# Patient Record
Sex: FEMALE | Race: ASIAN | NOT HISPANIC OR LATINO | Employment: OTHER | URBAN - METROPOLITAN AREA
[De-identification: names, ages, dates, MRNs, and addresses within clinical notes are randomized per-mention and may not be internally consistent; named-entity substitution may affect disease eponyms.]

---

## 2017-02-27 ENCOUNTER — HOSPITAL ENCOUNTER (OUTPATIENT)
Dept: LAB | Facility: MEDICAL CENTER | Age: 77
End: 2017-02-27
Attending: INTERNAL MEDICINE
Payer: MEDICARE

## 2017-02-27 LAB
ALBUMIN SERPL BCP-MCNC: 3.9 G/DL (ref 3.2–4.9)
ALBUMIN/GLOB SERPL: 1.3 G/DL
ALP SERPL-CCNC: 69 U/L (ref 30–99)
ALT SERPL-CCNC: 23 U/L (ref 2–50)
ANION GAP SERPL CALC-SCNC: 9 MMOL/L (ref 0–11.9)
AST SERPL-CCNC: 25 U/L (ref 12–45)
BILIRUB SERPL-MCNC: 0.5 MG/DL (ref 0.1–1.5)
BUN SERPL-MCNC: 26 MG/DL (ref 8–22)
CALCIUM SERPL-MCNC: 9.5 MG/DL (ref 8.5–10.5)
CHLORIDE SERPL-SCNC: 106 MMOL/L (ref 96–112)
CHOLEST SERPL-MCNC: 144 MG/DL (ref 100–199)
CO2 SERPL-SCNC: 26 MMOL/L (ref 20–33)
CREAT SERPL-MCNC: 0.89 MG/DL (ref 0.5–1.4)
CREAT UR-MCNC: 52.5 MG/DL
EST. AVERAGE GLUCOSE BLD GHB EST-MCNC: 157 MG/DL
GLOBULIN SER CALC-MCNC: 3.1 G/DL (ref 1.9–3.5)
GLUCOSE SERPL-MCNC: 138 MG/DL (ref 65–99)
HBA1C MFR BLD: 7.1 % (ref 0–5.6)
HDLC SERPL-MCNC: 47 MG/DL
LDLC SERPL CALC-MCNC: 80 MG/DL
MICROALBUMIN UR-MCNC: 7.7 MG/DL
MICROALBUMIN/CREAT UR: 147 MG/G (ref 0–30)
POTASSIUM SERPL-SCNC: 4 MMOL/L (ref 3.6–5.5)
PROT SERPL-MCNC: 7 G/DL (ref 6–8.2)
SODIUM SERPL-SCNC: 141 MMOL/L (ref 135–145)
TRIGL SERPL-MCNC: 87 MG/DL (ref 0–149)

## 2017-02-27 PROCEDURE — 36415 COLL VENOUS BLD VENIPUNCTURE: CPT

## 2017-02-27 PROCEDURE — 82043 UR ALBUMIN QUANTITATIVE: CPT

## 2017-02-27 PROCEDURE — 82570 ASSAY OF URINE CREATININE: CPT

## 2017-02-27 PROCEDURE — 83036 HEMOGLOBIN GLYCOSYLATED A1C: CPT

## 2017-02-27 PROCEDURE — 80061 LIPID PANEL: CPT

## 2017-02-27 PROCEDURE — 80053 COMPREHEN METABOLIC PANEL: CPT

## 2017-09-16 ENCOUNTER — HOSPITAL ENCOUNTER (OUTPATIENT)
Dept: LAB | Facility: MEDICAL CENTER | Age: 77
End: 2017-09-16
Attending: INTERNAL MEDICINE
Payer: MEDICARE

## 2017-09-16 LAB
ALBUMIN SERPL BCP-MCNC: 4.4 G/DL (ref 3.2–4.9)
ALBUMIN/GLOB SERPL: 1.4 G/DL
ALP SERPL-CCNC: 70 U/L (ref 30–99)
ALT SERPL-CCNC: 25 U/L (ref 2–50)
ANION GAP SERPL CALC-SCNC: 9 MMOL/L (ref 0–11.9)
AST SERPL-CCNC: 24 U/L (ref 12–45)
BILIRUB SERPL-MCNC: 0.7 MG/DL (ref 0.1–1.5)
BUN SERPL-MCNC: 31 MG/DL (ref 8–22)
CALCIUM SERPL-MCNC: 10.1 MG/DL (ref 8.5–10.5)
CHLORIDE SERPL-SCNC: 102 MMOL/L (ref 96–112)
CHOLEST SERPL-MCNC: 160 MG/DL (ref 100–199)
CO2 SERPL-SCNC: 27 MMOL/L (ref 20–33)
CREAT SERPL-MCNC: 0.86 MG/DL (ref 0.5–1.4)
CREAT UR-MCNC: 20.6 MG/DL
EST. AVERAGE GLUCOSE BLD GHB EST-MCNC: 166 MG/DL
GFR SERPL CREATININE-BSD FRML MDRD: >60 ML/MIN/1.73 M 2
GLOBULIN SER CALC-MCNC: 3.2 G/DL (ref 1.9–3.5)
GLUCOSE SERPL-MCNC: 142 MG/DL (ref 65–99)
HBA1C MFR BLD: 7.4 % (ref 0–5.6)
HDLC SERPL-MCNC: 51 MG/DL
LDLC SERPL CALC-MCNC: 78 MG/DL
MICROALBUMIN UR-MCNC: 1.3 MG/DL
MICROALBUMIN/CREAT UR: 63 MG/G (ref 0–30)
POTASSIUM SERPL-SCNC: 4.5 MMOL/L (ref 3.6–5.5)
PROT SERPL-MCNC: 7.6 G/DL (ref 6–8.2)
SODIUM SERPL-SCNC: 138 MMOL/L (ref 135–145)
TRIGL SERPL-MCNC: 155 MG/DL (ref 0–149)

## 2017-09-16 PROCEDURE — 82570 ASSAY OF URINE CREATININE: CPT

## 2017-09-16 PROCEDURE — 80053 COMPREHEN METABOLIC PANEL: CPT

## 2017-09-16 PROCEDURE — 83036 HEMOGLOBIN GLYCOSYLATED A1C: CPT

## 2017-09-16 PROCEDURE — 36415 COLL VENOUS BLD VENIPUNCTURE: CPT

## 2017-09-16 PROCEDURE — 80061 LIPID PANEL: CPT

## 2017-09-16 PROCEDURE — 82043 UR ALBUMIN QUANTITATIVE: CPT

## 2017-10-16 ENCOUNTER — HOSPITAL ENCOUNTER (OUTPATIENT)
Dept: RADIOLOGY | Facility: MEDICAL CENTER | Age: 77
End: 2017-10-16
Attending: FAMILY MEDICINE
Payer: MEDICARE

## 2017-10-16 DIAGNOSIS — Z12.31 SCREENING MAMMOGRAM, ENCOUNTER FOR: ICD-10-CM

## 2017-10-16 PROCEDURE — G0202 SCR MAMMO BI INCL CAD: HCPCS

## 2018-01-27 ENCOUNTER — OFFICE VISIT (OUTPATIENT)
Dept: URGENT CARE | Facility: CLINIC | Age: 78
End: 2018-01-27
Payer: MEDICARE

## 2018-01-27 VITALS
SYSTOLIC BLOOD PRESSURE: 122 MMHG | HEIGHT: 64 IN | OXYGEN SATURATION: 95 % | DIASTOLIC BLOOD PRESSURE: 80 MMHG | BODY MASS INDEX: 23.56 KG/M2 | RESPIRATION RATE: 20 BRPM | HEART RATE: 92 BPM | WEIGHT: 138 LBS | TEMPERATURE: 98 F

## 2018-01-27 DIAGNOSIS — R42 DIZZINESS: ICD-10-CM

## 2018-01-27 DIAGNOSIS — R11.0 NAUSEA: ICD-10-CM

## 2018-01-27 PROCEDURE — 99214 OFFICE O/P EST MOD 30 MIN: CPT | Performed by: PHYSICIAN ASSISTANT

## 2018-01-27 RX ORDER — ONDANSETRON 4 MG/1
4 TABLET, FILM COATED ORAL EVERY 4 HOURS PRN
Qty: 20 TAB | Refills: 0 | Status: SHIPPED | OUTPATIENT
Start: 2018-01-27 | End: 2018-02-01

## 2018-01-27 ASSESSMENT — ENCOUNTER SYMPTOMS
SINUS PAIN: 0
RESPIRATORY NEGATIVE: 1
VERTIGO: 1
HEADACHES: 0
MYALGIAS: 0
SENSORY CHANGE: 0
CARDIOVASCULAR NEGATIVE: 1
NAUSEA: 1
BLURRED VISION: 0
FEVER: 0
VOMITING: 0
TINGLING: 0
DOUBLE VISION: 0
CHILLS: 0
SORE THROAT: 0
DIARRHEA: 1
ABDOMINAL PAIN: 0
BLOOD IN STOOL: 0
DIZZINESS: 1

## 2018-01-27 ASSESSMENT — PATIENT HEALTH QUESTIONNAIRE - PHQ9: CLINICAL INTERPRETATION OF PHQ2 SCORE: 0

## 2018-01-27 NOTE — PROGRESS NOTES
Subjective:      Jewell Diaz is a 77 y.o. female who presents with Dizziness (Since yesterday dizziness)            Dizziness   This is a new problem. The current episode started yesterday. The problem occurs rarely. The problem has been rapidly improving. Associated symptoms include nausea and vertigo. Pertinent negatives include no abdominal pain, chills, congestion, fever, headaches, myalgias, sore throat or vomiting. Exacerbated by: Position change. She has tried nothing for the symptoms. The treatment provided no relief.   History of BPPV. Patient states this feels very similar. She is much improved today.       PMH:  has a past medical history of CATARACT (2009); Diabetes (2007); DJD (degenerative joint disease) of thoracic spine; Hypertension; and Pain.  MEDS:   Current Outpatient Prescriptions:   •  losartan (COZAAR) 50 MG TABS, Take 50 mg by mouth every day., Disp: , Rfl:   •  Metformin HCl 500 MG (MOD) TB24, Take  by mouth., Disp: , Rfl:   •  simvastatin (ZOCOR) 20 MG TABS, Take 20 mg by mouth every evening., Disp: , Rfl:   •  omeprazole (PRILOSEC) 20 MG CPDR, Take 20 mg by mouth every day., Disp: , Rfl:   •  azithromycin (ZITHROMAX) 250 MG Tab, Take 2 pills by mouth on day 1, take 1 pill by mouth on days 2-5, Disp: 6 Tab, Rfl: 0  •  benzonatate (TESSALON) 100 MG Cap, Take 1 Cap by mouth 3 times a day as needed for Cough., Disp: 30 Cap, Rfl: 0  •  multivitamin (THERAGRAN) TABS, Take 1 Tab by mouth every day., Disp: , Rfl:   ALLERGIES:   Allergies   Allergen Reactions   • Nkda [No Known Drug Allergy]      SURGHX:   Past Surgical History:   Procedure Laterality Date   • SHOSHANA BY LAPAROSCOPY  8/21/2012    Performed by CAMMIE HUGHES at SURGERY [facility]   • LUMBAR FUSION POSTERIOR  5/6/2010    Performed by CASSI RESTREPO at SURGERY [facility] ORS   • LUMBAR DECOMPRESSION  5/6/2010    Performed by CASSI RESTREPO at SURGERY [facility] ORS   • LUMBAR LAMINECTOMY DISKECTOMY  12/26/2009    Performed by  "CASSI RESTREPO at SURGERY [facility] ORS   • CATARACT EXT W/IOL  6/2009    bilateral   • VAGINAL HYSTERECTOMY TOTAL  1976    Hysterectomy,Total Vaginal     SOCHX:  reports that she has never smoked. She has never used smokeless tobacco. She reports that she does not drink alcohol or use drugs.  FH: family history includes Cancer in her sister.      Review of Systems   Constitutional: Negative for chills, fever and malaise/fatigue.   HENT: Negative for congestion, ear pain, sinus pain and sore throat.    Eyes: Negative for blurred vision and double vision.   Respiratory: Negative.    Cardiovascular: Negative.    Gastrointestinal: Positive for diarrhea and nausea. Negative for abdominal pain, blood in stool, melena and vomiting.   Genitourinary: Negative.    Musculoskeletal: Negative for joint pain and myalgias.   Neurological: Positive for dizziness and vertigo. Negative for tingling, sensory change and headaches.       Medications, Allergies, and current problem list reviewed today in Epic     Objective:     /80   Pulse 92   Temp 36.7 °C (98 °F)   Resp 20   Ht 1.626 m (5' 4\")   Wt 62.6 kg (138 lb)   SpO2 95%   BMI 23.69 kg/m²      Physical Exam   Constitutional: She is oriented to person, place, and time. She appears well-developed and well-nourished. No distress.   HENT:   Head: Normocephalic and atraumatic.   Right Ear: Tympanic membrane and external ear normal.   Left Ear: Tympanic membrane and external ear normal.   Nose: Nose normal.   Mouth/Throat: Oropharynx is clear and moist. No oropharyngeal exudate.   Eyes: Conjunctivae and EOM are normal. Pupils are equal, round, and reactive to light. Right eye exhibits no discharge. Left eye exhibits no discharge.   Neck: Normal range of motion. Neck supple.   Cardiovascular: Normal rate, regular rhythm and normal heart sounds.    Pulmonary/Chest: Effort normal and breath sounds normal. No respiratory distress. She has no wheezes.   Musculoskeletal: Normal " range of motion.   Lymphadenopathy:     She has no cervical adenopathy.   Neurological: She is alert and oriented to person, place, and time. She displays normal reflexes. No cranial nerve deficit. Coordination normal.   Skin: Skin is warm and dry. She is not diaphoretic.   Psychiatric: She has a normal mood and affect. Her behavior is normal. Judgment and thought content normal.   Nursing note and vitals reviewed.              Assessment/Plan:     1. Dizziness  ondansetron (ZOFRAN) 4 MG Tab tablet    CT-HEAD W/O   2. Nausea  ondansetron (ZOFRAN) 4 MG Tab tablet     Vitals normal, exam unremarkable, and her exam normal. Dizziness is positional with associated nausea. She has a history of vertigo. Given age a CT scan was ordered. Patient declines today. She will have that completed in the next few days if she is still symptomatic.  Zofran for nausea  OTC meds and conservative measures as discussed  Return to clinic or go to ED if symptoms worsen or persist. Indications for ED discussed at length. Patient voices understanding. Follow-up with your primary care provider in 3-5 days. Red flags discussed.    Please note that this dictation was created using voice recognition software. I have made every reasonable attempt to correct obvious errors, but I expect that there are errors of grammar and possibly content that I did not discover before finalizing the note.

## 2018-04-03 ENCOUNTER — HOSPITAL ENCOUNTER (OUTPATIENT)
Dept: CARDIOLOGY | Facility: MEDICAL CENTER | Age: 78
End: 2018-04-03
Attending: FAMILY MEDICINE
Payer: MEDICARE

## 2018-04-03 DIAGNOSIS — R01.1 UNDIAGNOSED CARDIAC MURMURS: ICD-10-CM

## 2018-04-03 LAB
LV EJECT FRACT  99904: 65
LV EJECT FRACT MOD 2C 99903: 75.85
LV EJECT FRACT MOD 4C 99902: 80.75
LV EJECT FRACT MOD BP 99901: 78.72

## 2018-04-03 PROCEDURE — 93306 TTE W/DOPPLER COMPLETE: CPT

## 2018-10-17 ENCOUNTER — HOSPITAL ENCOUNTER (OUTPATIENT)
Dept: RADIOLOGY | Facility: MEDICAL CENTER | Age: 78
End: 2018-10-17
Attending: FAMILY MEDICINE
Payer: MEDICARE

## 2018-10-17 DIAGNOSIS — Z12.31 SCREENING MAMMOGRAM, ENCOUNTER FOR: ICD-10-CM

## 2018-10-17 PROCEDURE — 77067 SCR MAMMO BI INCL CAD: CPT

## 2018-11-15 ENCOUNTER — HOSPITAL ENCOUNTER (OUTPATIENT)
Dept: LAB | Facility: MEDICAL CENTER | Age: 78
End: 2018-11-15
Attending: FAMILY MEDICINE
Payer: MEDICARE

## 2018-11-15 LAB
EST. AVERAGE GLUCOSE BLD GHB EST-MCNC: 174 MG/DL
HBA1C MFR BLD: 7.7 % (ref 0–5.6)

## 2018-11-15 PROCEDURE — 83036 HEMOGLOBIN GLYCOSYLATED A1C: CPT

## 2018-11-15 PROCEDURE — 82570 ASSAY OF URINE CREATININE: CPT

## 2018-11-15 PROCEDURE — 36415 COLL VENOUS BLD VENIPUNCTURE: CPT

## 2018-11-15 PROCEDURE — 80061 LIPID PANEL: CPT

## 2018-11-15 PROCEDURE — 84443 ASSAY THYROID STIM HORMONE: CPT

## 2018-11-15 PROCEDURE — 80053 COMPREHEN METABOLIC PANEL: CPT

## 2018-11-15 PROCEDURE — 82043 UR ALBUMIN QUANTITATIVE: CPT

## 2018-11-16 LAB
ALBUMIN SERPL BCP-MCNC: 4.3 G/DL (ref 3.2–4.9)
ALBUMIN/GLOB SERPL: 1.3 G/DL
ALP SERPL-CCNC: 69 U/L (ref 30–99)
ALT SERPL-CCNC: 21 U/L (ref 2–50)
ANION GAP SERPL CALC-SCNC: 8 MMOL/L (ref 0–11.9)
AST SERPL-CCNC: 24 U/L (ref 12–45)
BILIRUB SERPL-MCNC: 0.9 MG/DL (ref 0.1–1.5)
BUN SERPL-MCNC: 29 MG/DL (ref 8–22)
CALCIUM SERPL-MCNC: 10.4 MG/DL (ref 8.5–10.5)
CHLORIDE SERPL-SCNC: 103 MMOL/L (ref 96–112)
CHOLEST SERPL-MCNC: 139 MG/DL (ref 100–199)
CO2 SERPL-SCNC: 28 MMOL/L (ref 20–33)
CREAT SERPL-MCNC: 0.83 MG/DL (ref 0.5–1.4)
CREAT UR-MCNC: 44.4 MG/DL
GLOBULIN SER CALC-MCNC: 3.2 G/DL (ref 1.9–3.5)
GLUCOSE SERPL-MCNC: 147 MG/DL (ref 65–99)
HDLC SERPL-MCNC: 47 MG/DL
LDLC SERPL CALC-MCNC: 65 MG/DL
MICROALBUMIN UR-MCNC: 1.6 MG/DL
MICROALBUMIN/CREAT UR: 36 MG/G (ref 0–30)
POTASSIUM SERPL-SCNC: 4.1 MMOL/L (ref 3.6–5.5)
PROT SERPL-MCNC: 7.5 G/DL (ref 6–8.2)
SODIUM SERPL-SCNC: 139 MMOL/L (ref 135–145)
TRIGL SERPL-MCNC: 133 MG/DL (ref 0–149)
TSH SERPL DL<=0.005 MIU/L-ACNC: 1.64 UIU/ML (ref 0.38–5.33)

## 2018-12-14 ENCOUNTER — OFFICE VISIT (OUTPATIENT)
Dept: URGENT CARE | Facility: CLINIC | Age: 78
End: 2018-12-14
Payer: MEDICARE

## 2018-12-14 VITALS
HEART RATE: 88 BPM | DIASTOLIC BLOOD PRESSURE: 66 MMHG | OXYGEN SATURATION: 90 % | WEIGHT: 138 LBS | SYSTOLIC BLOOD PRESSURE: 114 MMHG | TEMPERATURE: 98.8 F | BODY MASS INDEX: 23.56 KG/M2 | RESPIRATION RATE: 18 BRPM | HEIGHT: 64 IN

## 2018-12-14 DIAGNOSIS — J22 LOWER RESPIRATORY INFECTION: Primary | ICD-10-CM

## 2018-12-14 LAB
FLUAV+FLUBV AG SPEC QL IA: NORMAL
INT CON NEG: NEGATIVE
INT CON POS: POSITIVE

## 2018-12-14 PROCEDURE — 99214 OFFICE O/P EST MOD 30 MIN: CPT | Performed by: PHYSICIAN ASSISTANT

## 2018-12-14 PROCEDURE — 87804 INFLUENZA ASSAY W/OPTIC: CPT | Performed by: PHYSICIAN ASSISTANT

## 2018-12-14 RX ORDER — BENZONATATE 100 MG/1
100 CAPSULE ORAL 3 TIMES DAILY PRN
Qty: 30 CAP | Refills: 0 | Status: SHIPPED | OUTPATIENT
Start: 2018-12-14 | End: 2019-05-03

## 2018-12-14 RX ORDER — AZITHROMYCIN 250 MG/1
TABLET, FILM COATED ORAL
Qty: 6 TAB | Refills: 0 | Status: SHIPPED | OUTPATIENT
Start: 2018-12-14 | End: 2019-05-03

## 2018-12-14 ASSESSMENT — ENCOUNTER SYMPTOMS
RHINORRHEA: 1
FEVER: 1
CHILLS: 1
COUGH: 1
CONSTIPATION: 0
NAUSEA: 0
SHORTNESS OF BREATH: 0
DIARRHEA: 1
SPUTUM PRODUCTION: 0
SORE THROAT: 0
WHEEZING: 0
ABDOMINAL PAIN: 0
VOMITING: 0
MYALGIAS: 1

## 2018-12-14 ASSESSMENT — COPD QUESTIONNAIRES: COPD: 0

## 2018-12-14 NOTE — PROGRESS NOTES
Subjective:   Jewell Diaz is a 78 y.o. female who presents for Cough (x 3 dry; ); Generalized Body Aches (x 3 days; ); and Fever (x 2 days )        Cough   This is a new problem. Episode onset: 3 days. The cough is non-productive. Associated symptoms include chills, a fever (pt percieved ), myalgias and rhinorrhea. Pertinent negatives include no ear pain, nasal congestion, sore throat, shortness of breath or wheezing. Risk factors: Denies recent travel, hospitalization, surgeries. Nonsmoker.  Treatments tried: Robitussin and tylenol  The treatment provided mild relief. Her past medical history is significant for bronchitis. There is no history of asthma, COPD or pneumonia.     Review of Systems   Constitutional: Positive for chills and fever (pt percieved ). Negative for malaise/fatigue.   HENT: Positive for rhinorrhea. Negative for congestion, ear pain and sore throat.    Respiratory: Positive for cough. Negative for sputum production, shortness of breath and wheezing.    Gastrointestinal: Positive for diarrhea. Negative for abdominal pain, constipation, nausea and vomiting.   Musculoskeletal: Positive for myalgias.   All other systems reviewed and are negative.      PMH:  has a past medical history of CATARACT (2009); Diabetes (2007); DJD (degenerative joint disease) of thoracic spine; Hypertension; and Pain.  MEDS:   Current Outpatient Prescriptions:   •  azithromycin (ZITHROMAX) 250 MG Tab, Take 2 pills by mouth on day 1, take 1 pill by mouth on days 2-5, Disp: 6 Tab, Rfl: 0  •  benzonatate (TESSALON) 100 MG Cap, Take 1 Cap by mouth 3 times a day as needed for Cough., Disp: 30 Cap, Rfl: 0  •  losartan (COZAAR) 50 MG TABS, Take 50 mg by mouth every day., Disp: , Rfl:   •  multivitamin (THERAGRAN) TABS, Take 1 Tab by mouth every day., Disp: , Rfl:   •  Metformin HCl 500 MG (MOD) TB24, Take  by mouth., Disp: , Rfl:   •  simvastatin (ZOCOR) 20 MG TABS, Take 20 mg by mouth every evening., Disp: , Rfl:  "  •  omeprazole (PRILOSEC) 20 MG CPDR, Take 20 mg by mouth every day., Disp: , Rfl:   ALLERGIES:   Allergies   Allergen Reactions   • Nkda [No Known Drug Allergy]      SURGHX:   Past Surgical History:   Procedure Laterality Date   • SHOSHANA BY LAPAROSCOPY  8/21/2012    Performed by CAMMIE HUGHES at SURGERY [facility] ORS   • LUMBAR FUSION POSTERIOR  5/6/2010    Performed by CASSI RESTREPO at SURGERY [facility]S   • LUMBAR DECOMPRESSION  5/6/2010    Performed by CASSI RESTREPO at SURGERY [facility] OR[facility]   • LUMBAR LAMINECTOMY DISKECTOMY  12/26/2009    Performed by CASSI RESTREPO at SURGERY [facility] ORS   • CATARACT EXT W/IOL  6/2009    bilateral   • VAGINAL HYSTERECTOMY TOTAL  1976    Hysterectomy,Total Vaginal     SOCHX:  reports that she has never smoked. She has never used smokeless tobacco. She reports that she does not drink alcohol or use drugs.  Family History   Problem Relation Age of Onset   • Cancer Sister         breast        Objective:   /66 (BP Location: Left arm, Patient Position: Sitting, BP Cuff Size: Adult)   Pulse 88   Temp 37.1 °C (98.8 °F) (Temporal)   Resp 18   Ht 1.626 m (5' 4\")   Wt 62.6 kg (138 lb)   SpO2 90%   BMI 23.69 kg/m²     Physical Exam   Constitutional: She is oriented to person, place, and time. She appears well-developed and well-nourished. No distress.   HENT:   Head: Normocephalic and atraumatic.   Right Ear: Hearing, tympanic membrane and ear canal normal.   Left Ear: Hearing, tympanic membrane and ear canal normal.   Mouth/Throat: No posterior oropharyngeal edema or posterior oropharyngeal erythema.   Eyes: Pupils are equal, round, and reactive to light. Conjunctivae are normal.   Neck: Normal range of motion. Neck supple.   Cardiovascular: Normal rate and regular rhythm.    Pulmonary/Chest: Effort normal. No respiratory distress. She has no wheezes. She has no rales.   Lymphadenopathy:     She has no cervical adenopathy.   Neurological: She is alert and " oriented to person, place, and time.   Skin: Skin is warm and dry.   Psychiatric: She has a normal mood and affect. Her behavior is normal.   Vitals reviewed.    Influenza A/B negative        Assessment/Plan:     1. Lower respiratory infection  azithromycin (ZITHROMAX) 250 MG Tab    benzonatate (TESSALON) 100 MG Cap    REFERRAL TO FOLLOW-UP WITH PRIMARY CARE    POCT Influenza A/B     Patient directed to take full course of abx.  Supportive care reviewed. Patient would like to defer chest x-ray today, patient can contact me if symptoms persist and she would like me to place outpatient x-ray.    Discussed importance of follow-up with primary care provider within 7-10 days.  If symptoms worsen or persist patient can contact me or return to clinic for follow-up.    Thorough review of red flags and strict emergency room precautions discussed.  Patient and  appear understanding of information.

## 2018-12-14 NOTE — PATIENT INSTRUCTIONS
"Increase fluids   Warm salt water gargles     If symptoms are not improved please call me we can get a chest xray       Upper Respiratory Infection, Adult  Most upper respiratory infections (URIs) are caused by a virus. A URI affects the nose, throat, and upper air passages. The most common type of URI is often called \"the common cold.\"  Follow these instructions at home:  · Take medicines only as told by your doctor.  · Gargle warm saltwater or take cough drops to comfort your throat as told by your doctor.  · Use a warm mist humidifier or inhale steam from a shower to increase air moisture. This may make it easier to breathe.  · Drink enough fluid to keep your pee (urine) clear or pale yellow.  · Eat soups and other clear broths.  · Have a healthy diet.  · Rest as needed.  · Go back to work when your fever is gone or your doctor says it is okay.  ¨ You may need to stay home longer to avoid giving your URI to others.  ¨ You can also wear a face mask and wash your hands often to prevent spread of the virus.  · Use your inhaler more if you have asthma.  · Do not use any tobacco products, including cigarettes, chewing tobacco, or electronic cigarettes. If you need help quitting, ask your doctor.  Contact a doctor if:  · You are getting worse, not better.  · Your symptoms are not helped by medicine.  · You have chills.  · You are getting more short of breath.  · You have brown or red mucus.  · You have yellow or brown discharge from your nose.  · You have pain in your face, especially when you bend forward.  · You have a fever.  · You have puffy (swollen) neck glands.  · You have pain while swallowing.  · You have white areas in the back of your throat.  Get help right away if:  · You have very bad or constant:  ¨ Headache.  ¨ Ear pain.  ¨ Pain in your forehead, behind your eyes, and over your cheekbones (sinus pain).  ¨ Chest pain.  · You have long-lasting (chronic) lung disease and any of the " following:  ¨ Wheezing.  ¨ Long-lasting cough.  ¨ Coughing up blood.  ¨ A change in your usual mucus.  · You have a stiff neck.  · You have changes in your:  ¨ Vision.  ¨ Hearing.  ¨ Thinking.  ¨ Mood.  This information is not intended to replace advice given to you by your health care provider. Make sure you discuss any questions you have with your health care provider.  Document Released: 06/05/2009 Document Revised: 08/20/2017 Document Reviewed: 03/25/2015  Elsevier Interactive Patient Education © 2017 [employer]

## 2019-02-23 ENCOUNTER — APPOINTMENT (OUTPATIENT)
Dept: URGENT CARE | Facility: MEDICAL CENTER | Age: 79
End: 2019-02-23
Payer: MEDICARE

## 2019-02-23 ENCOUNTER — OFFICE VISIT (OUTPATIENT)
Dept: URGENT CARE | Facility: MEDICAL CENTER | Age: 79
End: 2019-02-23
Payer: MEDICARE

## 2019-02-23 VITALS
SYSTOLIC BLOOD PRESSURE: 116 MMHG | HEART RATE: 79 BPM | HEIGHT: 64 IN | DIASTOLIC BLOOD PRESSURE: 68 MMHG | WEIGHT: 146.2 LBS | TEMPERATURE: 99 F | OXYGEN SATURATION: 94 % | BODY MASS INDEX: 24.96 KG/M2

## 2019-02-23 DIAGNOSIS — S16.1XXA STRAIN OF NECK MUSCLE, INITIAL ENCOUNTER: ICD-10-CM

## 2019-02-23 PROCEDURE — 99214 OFFICE O/P EST MOD 30 MIN: CPT | Performed by: PHYSICIAN ASSISTANT

## 2019-02-23 RX ORDER — METHYLPREDNISOLONE 4 MG/1
TABLET ORAL
Qty: 1 KIT | Refills: 0 | Status: SHIPPED | OUTPATIENT
Start: 2019-02-23 | End: 2019-05-03

## 2019-03-01 ASSESSMENT — ENCOUNTER SYMPTOMS
ABDOMINAL PAIN: 0
TINGLING: 0
PALPITATIONS: 0
WEAKNESS: 0
SENSORY CHANGE: 0
NAUSEA: 0
HEADACHES: 0
SYNCOPE: 0
NUMBNESS: 0
TROUBLE SWALLOWING: 0
BLURRED VISION: 0
WEIGHT LOSS: 0
FEVER: 0
CHILLS: 0
PHOTOPHOBIA: 0
VOMITING: 0
NECK PAIN: 1
SHORTNESS OF BREATH: 0

## 2019-03-01 NOTE — PROGRESS NOTES
Subjective:      Jewell Diaz is a 78 y.o. female who presents with Neck Pain (x1 day; headache; hx of high BP )      Neck Pain    This is a new problem. The current episode started yesterday. The problem occurs constantly. The problem has been gradually worsening. The pain is associated with a sleep position. The pain is present in the left side. The quality of the pain is described as aching and shooting. The pain is moderate. The symptoms are aggravated by position and twisting. The pain is same all the time. Pertinent negatives include no chest pain, fever, headaches, numbness, photophobia, syncope, tingling, trouble swallowing, weakness or weight loss. She has tried NSAIDs for the symptoms. The treatment provided no relief.       Review of Systems   Constitutional: Negative for chills, fever and weight loss.   HENT: Negative for trouble swallowing.    Eyes: Negative for blurred vision and photophobia.   Respiratory: Negative for shortness of breath.    Cardiovascular: Negative for chest pain, palpitations and syncope.   Gastrointestinal: Negative for abdominal pain, nausea and vomiting.   Musculoskeletal: Positive for neck pain.   Neurological: Negative for tingling, sensory change, weakness, numbness and headaches.       PMH:  has a past medical history of CATARACT (2009); Diabetes (2007); DJD (degenerative joint disease) of thoracic spine; Hypertension; and Pain.  MEDS:   Current Outpatient Prescriptions:   •  MethylPREDNISolone (MEDROL DOSEPAK) 4 MG Tablet Therapy Pack, Take as directed, Disp: 1 Kit, Rfl: 0  •  azithromycin (ZITHROMAX) 250 MG Tab, Take 2 pills by mouth on day 1, take 1 pill by mouth on days 2-5, Disp: 6 Tab, Rfl: 0  •  benzonatate (TESSALON) 100 MG Cap, Take 1 Cap by mouth 3 times a day as needed for Cough., Disp: 30 Cap, Rfl: 0  •  losartan (COZAAR) 50 MG TABS, Take 50 mg by mouth every day., Disp: , Rfl:   •  multivitamin (THERAGRAN) TABS, Take 1 Tab by mouth every day., Disp:  ", Rfl:   •  Metformin HCl 500 MG (MOD) TB24, Take  by mouth., Disp: , Rfl:   •  simvastatin (ZOCOR) 20 MG TABS, Take 20 mg by mouth every evening., Disp: , Rfl:   •  omeprazole (PRILOSEC) 20 MG CPDR, Take 20 mg by mouth every day., Disp: , Rfl:   ALLERGIES:   Allergies   Allergen Reactions   • Nkda [No Known Drug Allergy]      SURGHX:   Past Surgical History:   Procedure Laterality Date   • SHOSHANA BY LAPAROSCOPY  8/21/2012    Performed by CAMMIE HUGHES at SURGERY [facility]S   • LUMBAR FUSION POSTERIOR  5/6/2010    Performed by CASSI RESTREPO at SURGERY [facility]   • LUMBAR DECOMPRESSION  5/6/2010    Performed by CASSI RESTREPO at SURGERY [facility]   • LUMBAR LAMINECTOMY DISKECTOMY  12/26/2009    Performed by CASSI RESTREPO at SURGERY [facility]   • CATARACT EXT W/IOL  6/2009    bilateral   • VAGINAL HYSTERECTOMY TOTAL  1976    Hysterectomy,Total Vaginal     SOCHX:  reports that she has never smoked. She has never used smokeless tobacco. She reports that she does not drink alcohol or use drugs.  FH: Family history was reviewed, no pertinent findings to report     Objective:     /68   Pulse 79   Temp 37.2 °C (99 °F) (Temporal)   Ht 1.626 m (5' 4\")   Wt 66.3 kg (146 lb 3.2 oz)   SpO2 94%   BMI 25.10 kg/m²      Physical Exam   Constitutional: She is oriented to person, place, and time. She appears well-developed and well-nourished.   HENT:   Head: Normocephalic and atraumatic.   Right Ear: External ear normal.   Left Ear: External ear normal.   Eyes: Pupils are equal, round, and reactive to light. Conjunctivae are normal.   Cardiovascular: Normal rate, regular rhythm and normal heart sounds.    No murmur heard.  Pulmonary/Chest: Effort normal and breath sounds normal. She has no wheezes.   Musculoskeletal:        Cervical back: She exhibits decreased range of motion and tenderness. She exhibits no bony tenderness and no swelling.   Neurological: She is alert and oriented to person, " place, and time. She has normal strength. No cranial nerve deficit or sensory deficit.   Skin: Skin is warm and dry. Capillary refill takes less than 2 seconds.   Psychiatric: She has a normal mood and affect. Her behavior is normal. Judgment normal.   Vitals reviewed.       Assessment/Plan:     1. Strain of neck muscle, initial encounter  - MethylPREDNISolone (MEDROL DOSEPAK) 4 MG Tablet Therapy Pack; Take as directed  Dispense: 1 Kit; Refill: 0  - Alternate ice/heat  - Follow-up if no improvement after 4-5 days        Differential Diagnosis, natural history, and supportive care discussed. Return to the Urgent Care or follow up with your PCP if symptoms fail to resolve, or for any new or worsening symptoms. Emergency room precautions discussed. Patient and/or family appears understanding of information.

## 2019-05-03 ENCOUNTER — OFFICE VISIT (OUTPATIENT)
Dept: URGENT CARE | Facility: MEDICAL CENTER | Age: 79
End: 2019-05-03
Payer: MEDICARE

## 2019-05-03 VITALS
SYSTOLIC BLOOD PRESSURE: 124 MMHG | WEIGHT: 143 LBS | TEMPERATURE: 98 F | BODY MASS INDEX: 24.55 KG/M2 | OXYGEN SATURATION: 94 % | RESPIRATION RATE: 16 BRPM | DIASTOLIC BLOOD PRESSURE: 60 MMHG | HEART RATE: 97 BPM
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
## 2019-05-03 DIAGNOSIS — S13.4XXA WHIPLASH INJURIES, INITIAL ENCOUNTER: Primary | ICD-10-CM

## 2019-05-03 PROCEDURE — 99214 OFFICE O/P EST MOD 30 MIN: CPT | Performed by: PHYSICIAN ASSISTANT

## 2019-05-03 RX ORDER — METHYLPREDNISOLONE 4 MG/1
TABLET ORAL
Qty: 21 TAB | Refills: 0 | Status: ON HOLD | OUTPATIENT
Start: 2019-05-03 | End: 2020-07-21

## 2019-05-03 RX ORDER — KETOROLAC TROMETHAMINE 30 MG/ML
30 INJECTION, SOLUTION INTRAMUSCULAR; INTRAVENOUS ONCE
Status: COMPLETED | OUTPATIENT
Start: 2019-05-03 | End: 2019-05-03

## 2019-05-03 RX ADMIN — KETOROLAC TROMETHAMINE 30 MG: 30 INJECTION, SOLUTION INTRAMUSCULAR; INTRAVENOUS at 16:20

## 2019-05-03 NOTE — PATIENT INSTRUCTIONS
Cervical Sprain  A cervical sprain is a stretch or tear in the tissues that connect bones (ligaments) in the neck. Most neck (cervical) sprains get better in 4-6 weeks.  Follow these instructions at home:  If you have a neck collar:  · Wear it as told by your doctor. Do not take off (do not remove) the collar unless your doctor says that this is safe.  · Ask your doctor before adjusting your collar.  · If you have long hair, keep it outside of the collar.  · Ask your doctor if you may take off the collar for cleaning and bathing. If you may take off the collar:  ¨ Follow instructions from your doctor about how to take off the collar safely.  ¨ Clean the collar by wiping it with mild soap and water. Let it air-dry all the way.  ¨ If your collar has removable pads:  § Take the pads out every 1-2 days.  § Hand wash the pads with soap and water.  § Let the pads air-dry all the way before you put them back in the collar. Do not dry them in a clothes dryer. Do not dry them with a hair dryer.  ¨ Check your skin under the collar for irritation or sores. If you see any, tell your doctor.  Managing pain, stiffness, and swelling  · Use a cervical traction device, if told by your doctor.  · If told, put heat on the affected area. Do this before exercises (physical therapy) or as often as told by your doctor. Use the heat source that your doctor recommends, such as a moist heat pack or a heating pad.  ¨ Place a towel between your skin and the heat source.  ¨ Leave the heat on for 20-30 minutes.  ¨ Take the heat off (remove the heat) if your skin turns bright red. This is very important if you cannot feel pain, heat, or cold. You may have a greater risk of getting burned.  · Put ice on the affected area.  ¨ Put ice in a plastic bag.  ¨ Place a towel between your skin and the bag.  ¨ Leave the ice on for 20 minutes, 2-3 times a day.  Activity  · Do not drive while wearing a neck collar. If you do not have a neck collar, ask your  doctor if it is safe to drive.  · Do not drive or use heavy machinery while taking prescription pain medicine or muscle relaxants, unless your doctor approves.  · Do not lift anything that is heavier than 10 lb (4.5 kg) until your doctor tells you that it is safe.  · Rest as told by your doctor.  · Avoid activities that make you feel worse. Ask your doctor what activities are safe for you.  · Do exercises as told by your doctor or physical therapist.  Preventing neck sprain  · Practice good posture. Adjust your workstation to help with this, if needed.  · Exercise regularly as told by your doctor or physical therapist.  · Avoid activities that are risky or may cause a neck sprain (cervical sprain).  General instructions  · Take over-the-counter and prescription medicines only as told by your doctor.  · Do not use any products that contain nicotine or tobacco. This includes cigarettes and e-cigarettes. If you need help quitting, ask your doctor.  · Keep all follow-up visits as told by your doctor. This is important.  Contact a doctor if:  · You have pain or other symptoms that get worse.  · You have symptoms that do not get better after 2 weeks.  · You have pain that does not get better with medicine.  · You start to have new, unexplained symptoms.  · You have sores or irritated skin from wearing your neck collar.  Get help right away if:  · You have very bad pain.  · You have any of the following in any part of your body:  ¨ Loss of feeling (numbness).  ¨ Tingling.  ¨ Weakness.  · You cannot move a part of your body (you have paralysis).  · Your activity level does not improve.  Summary  · A cervical sprain is a stretch or tear in the tissues that connect bones (ligaments) in the neck.  · If you have a neck (cervical) collar, do not take off the collar unless your doctor says that this is safe.  · Put ice on affected areas as told by your doctor.  · Put heat on affected areas as told by your doctor.  · Good posture  and regular exercise can help prevent a neck sprain from happening again.  This information is not intended to replace advice given to you by your health care provider. Make sure you discuss any questions you have with your health care provider.  Document Released: 06/05/2009 Document Revised: 08/29/2017 Document Reviewed: 08/29/2017  [employer] Interactive Patient Education © 2017 [employer]
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
## 2019-10-04 ENCOUNTER — HOSPITAL ENCOUNTER (OUTPATIENT)
Dept: LAB | Facility: MEDICAL CENTER | Age: 79
End: 2019-10-04
Attending: FAMILY MEDICINE
Payer: MEDICARE

## 2019-10-04 LAB
25(OH)D3 SERPL-MCNC: 27 NG/ML (ref 30–100)
ALBUMIN SERPL BCP-MCNC: 4.4 G/DL (ref 3.2–4.9)
ALBUMIN/GLOB SERPL: 1.4 G/DL
ALP SERPL-CCNC: 63 U/L (ref 30–99)
ALT SERPL-CCNC: 24 U/L (ref 2–50)
ANION GAP SERPL CALC-SCNC: 10 MMOL/L (ref 0–11.9)
AST SERPL-CCNC: 25 U/L (ref 12–45)
BASOPHILS # BLD AUTO: 0.7 % (ref 0–1.8)
BASOPHILS # BLD: 0.05 K/UL (ref 0–0.12)
BILIRUB SERPL-MCNC: 0.5 MG/DL (ref 0.1–1.5)
BUN SERPL-MCNC: 32 MG/DL (ref 8–22)
CALCIUM SERPL-MCNC: 10 MG/DL (ref 8.5–10.5)
CHLORIDE SERPL-SCNC: 102 MMOL/L (ref 96–112)
CHOLEST SERPL-MCNC: 150 MG/DL (ref 100–199)
CO2 SERPL-SCNC: 27 MMOL/L (ref 20–33)
CREAT SERPL-MCNC: 0.87 MG/DL (ref 0.5–1.4)
EOSINOPHIL # BLD AUTO: 0.17 K/UL (ref 0–0.51)
EOSINOPHIL NFR BLD: 2.5 % (ref 0–6.9)
ERYTHROCYTE [DISTWIDTH] IN BLOOD BY AUTOMATED COUNT: 46.4 FL (ref 35.9–50)
EST. AVERAGE GLUCOSE BLD GHB EST-MCNC: 169 MG/DL
FASTING STATUS PATIENT QL REPORTED: NORMAL
GLOBULIN SER CALC-MCNC: 3.1 G/DL (ref 1.9–3.5)
GLUCOSE SERPL-MCNC: 147 MG/DL (ref 65–99)
HBA1C MFR BLD: 7.5 % (ref 0–5.6)
HCT VFR BLD AUTO: 42.6 % (ref 37–47)
HDLC SERPL-MCNC: 45 MG/DL
HGB BLD-MCNC: 13.4 G/DL (ref 12–16)
IMM GRANULOCYTES # BLD AUTO: 0.02 K/UL (ref 0–0.11)
IMM GRANULOCYTES NFR BLD AUTO: 0.3 % (ref 0–0.9)
LDLC SERPL CALC-MCNC: 87 MG/DL
LYMPHOCYTES # BLD AUTO: 2.01 K/UL (ref 1–4.8)
LYMPHOCYTES NFR BLD: 29.2 % (ref 22–41)
MCH RBC QN AUTO: 31.1 PG (ref 27–33)
MCHC RBC AUTO-ENTMCNC: 31.5 G/DL (ref 33.6–35)
MCV RBC AUTO: 98.8 FL (ref 81.4–97.8)
MONOCYTES # BLD AUTO: 0.68 K/UL (ref 0–0.85)
MONOCYTES NFR BLD AUTO: 9.9 % (ref 0–13.4)
NEUTROPHILS # BLD AUTO: 3.95 K/UL (ref 2–7.15)
NEUTROPHILS NFR BLD: 57.4 % (ref 44–72)
NRBC # BLD AUTO: 0 K/UL
NRBC BLD-RTO: 0 /100 WBC
PLATELET # BLD AUTO: 278 K/UL (ref 164–446)
PMV BLD AUTO: 9.9 FL (ref 9–12.9)
POTASSIUM SERPL-SCNC: 4.2 MMOL/L (ref 3.6–5.5)
PROT SERPL-MCNC: 7.5 G/DL (ref 6–8.2)
RBC # BLD AUTO: 4.31 M/UL (ref 4.2–5.4)
SODIUM SERPL-SCNC: 139 MMOL/L (ref 135–145)
TRIGL SERPL-MCNC: 92 MG/DL (ref 0–149)
WBC # BLD AUTO: 6.9 K/UL (ref 4.8–10.8)

## 2019-10-04 PROCEDURE — 36415 COLL VENOUS BLD VENIPUNCTURE: CPT

## 2019-10-04 PROCEDURE — 80061 LIPID PANEL: CPT

## 2019-10-04 PROCEDURE — 84443 ASSAY THYROID STIM HORMONE: CPT

## 2019-10-04 PROCEDURE — 82306 VITAMIN D 25 HYDROXY: CPT

## 2019-10-04 PROCEDURE — 80053 COMPREHEN METABOLIC PANEL: CPT

## 2019-10-04 PROCEDURE — 85025 COMPLETE CBC W/AUTO DIFF WBC: CPT

## 2019-10-04 PROCEDURE — 83036 HEMOGLOBIN GLYCOSYLATED A1C: CPT

## 2019-10-05 LAB — TSH SERPL DL<=0.005 MIU/L-ACNC: 2.53 UIU/ML (ref 0.38–5.33)

## 2019-11-06 ENCOUNTER — HOSPITAL ENCOUNTER (OUTPATIENT)
Dept: RADIOLOGY | Facility: MEDICAL CENTER | Age: 79
End: 2019-11-06
Attending: FAMILY MEDICINE
Payer: MEDICARE

## 2019-11-06 DIAGNOSIS — Z12.31 VISIT FOR SCREENING MAMMOGRAM: ICD-10-CM

## 2019-11-06 PROCEDURE — 77063 BREAST TOMOSYNTHESIS BI: CPT

## 2019-12-09 ENCOUNTER — APPOINTMENT (OUTPATIENT)
Dept: RADIOLOGY | Facility: MEDICAL CENTER | Age: 79
End: 2019-12-09
Attending: EMERGENCY MEDICINE
Payer: MEDICARE

## 2019-12-09 ENCOUNTER — OFFICE VISIT (OUTPATIENT)
Dept: URGENT CARE | Facility: MEDICAL CENTER | Age: 79
End: 2019-12-09
Payer: MEDICARE

## 2019-12-09 ENCOUNTER — HOSPITAL ENCOUNTER (EMERGENCY)
Facility: MEDICAL CENTER | Age: 79
End: 2019-12-09
Attending: EMERGENCY MEDICINE
Payer: MEDICARE

## 2019-12-09 VITALS
TEMPERATURE: 98.7 F | OXYGEN SATURATION: 91 % | BODY MASS INDEX: 24.37 KG/M2 | RESPIRATION RATE: 18 BRPM | DIASTOLIC BLOOD PRESSURE: 76 MMHG | HEART RATE: 85 BPM | SYSTOLIC BLOOD PRESSURE: 124 MMHG | WEIGHT: 142 LBS

## 2019-12-09 VITALS
SYSTOLIC BLOOD PRESSURE: 118 MMHG | BODY MASS INDEX: 23.71 KG/M2 | DIASTOLIC BLOOD PRESSURE: 58 MMHG | TEMPERATURE: 98.7 F | HEIGHT: 64 IN | HEART RATE: 84 BPM | RESPIRATION RATE: 18 BRPM | OXYGEN SATURATION: 96 % | WEIGHT: 138.89 LBS

## 2019-12-09 DIAGNOSIS — R09.02 HYPOXIA: ICD-10-CM

## 2019-12-09 DIAGNOSIS — M25.512 LEFT SHOULDER PAIN, UNSPECIFIED CHRONICITY: ICD-10-CM

## 2019-12-09 DIAGNOSIS — M25.512 ACUTE PAIN OF LEFT SHOULDER: ICD-10-CM

## 2019-12-09 LAB
ALBUMIN SERPL BCP-MCNC: 4.3 G/DL (ref 3.2–4.9)
ALBUMIN/GLOB SERPL: 1.1 G/DL
ALP SERPL-CCNC: 89 U/L (ref 30–99)
ALT SERPL-CCNC: 21 U/L (ref 2–50)
ANION GAP SERPL CALC-SCNC: 15 MMOL/L (ref 0–11.9)
AST SERPL-CCNC: 20 U/L (ref 12–45)
BASOPHILS # BLD AUTO: 0.4 % (ref 0–1.8)
BASOPHILS # BLD: 0.04 K/UL (ref 0–0.12)
BILIRUB SERPL-MCNC: 0.5 MG/DL (ref 0.1–1.5)
BUN SERPL-MCNC: 15 MG/DL (ref 8–22)
CALCIUM SERPL-MCNC: 9.9 MG/DL (ref 8.4–10.2)
CHLORIDE SERPL-SCNC: 100 MMOL/L (ref 96–112)
CO2 SERPL-SCNC: 24 MMOL/L (ref 20–33)
CREAT SERPL-MCNC: 0.77 MG/DL (ref 0.5–1.4)
EOSINOPHIL # BLD AUTO: 0.12 K/UL (ref 0–0.51)
EOSINOPHIL NFR BLD: 1.2 % (ref 0–6.9)
ERYTHROCYTE [DISTWIDTH] IN BLOOD BY AUTOMATED COUNT: 44 FL (ref 35.9–50)
GLOBULIN SER CALC-MCNC: 3.8 G/DL (ref 1.9–3.5)
GLUCOSE SERPL-MCNC: 181 MG/DL (ref 65–99)
HCT VFR BLD AUTO: 40.9 % (ref 37–47)
HGB BLD-MCNC: 13.4 G/DL (ref 12–16)
IMM GRANULOCYTES # BLD AUTO: 0.05 K/UL (ref 0–0.11)
IMM GRANULOCYTES NFR BLD AUTO: 0.5 % (ref 0–0.9)
LYMPHOCYTES # BLD AUTO: 1.78 K/UL (ref 1–4.8)
LYMPHOCYTES NFR BLD: 17.6 % (ref 22–41)
MCH RBC QN AUTO: 31.8 PG (ref 27–33)
MCHC RBC AUTO-ENTMCNC: 32.8 G/DL (ref 33.6–35)
MCV RBC AUTO: 96.9 FL (ref 81.4–97.8)
MONOCYTES # BLD AUTO: 0.97 K/UL (ref 0–0.85)
MONOCYTES NFR BLD AUTO: 9.6 % (ref 0–13.4)
NEUTROPHILS # BLD AUTO: 7.13 K/UL (ref 2–7.15)
NEUTROPHILS NFR BLD: 70.7 % (ref 44–72)
NRBC # BLD AUTO: 0 K/UL
NRBC BLD-RTO: 0 /100 WBC
NT-PROBNP SERPL IA-MCNC: 81 PG/ML (ref 0–125)
PLATELET # BLD AUTO: 311 K/UL (ref 164–446)
PMV BLD AUTO: 9.2 FL (ref 9–12.9)
POTASSIUM SERPL-SCNC: 3.9 MMOL/L (ref 3.6–5.5)
PROT SERPL-MCNC: 8.1 G/DL (ref 6–8.2)
RBC # BLD AUTO: 4.22 M/UL (ref 4.2–5.4)
SODIUM SERPL-SCNC: 139 MMOL/L (ref 135–145)
TROPONIN T SERPL-MCNC: 17 NG/L (ref 6–19)
WBC # BLD AUTO: 10.1 K/UL (ref 4.8–10.8)

## 2019-12-09 PROCEDURE — 85025 COMPLETE CBC W/AUTO DIFF WBC: CPT

## 2019-12-09 PROCEDURE — 700102 HCHG RX REV CODE 250 W/ 637 OVERRIDE(OP): Performed by: EMERGENCY MEDICINE

## 2019-12-09 PROCEDURE — 99284 EMERGENCY DEPT VISIT MOD MDM: CPT

## 2019-12-09 PROCEDURE — 36415 COLL VENOUS BLD VENIPUNCTURE: CPT

## 2019-12-09 PROCEDURE — 93005 ELECTROCARDIOGRAM TRACING: CPT | Performed by: EMERGENCY MEDICINE

## 2019-12-09 PROCEDURE — A9270 NON-COVERED ITEM OR SERVICE: HCPCS | Performed by: EMERGENCY MEDICINE

## 2019-12-09 PROCEDURE — 73030 X-RAY EXAM OF SHOULDER: CPT | Mod: LT

## 2019-12-09 PROCEDURE — 83880 ASSAY OF NATRIURETIC PEPTIDE: CPT

## 2019-12-09 PROCEDURE — 71045 X-RAY EXAM CHEST 1 VIEW: CPT

## 2019-12-09 PROCEDURE — 84484 ASSAY OF TROPONIN QUANT: CPT

## 2019-12-09 PROCEDURE — 99214 OFFICE O/P EST MOD 30 MIN: CPT | Performed by: FAMILY MEDICINE

## 2019-12-09 PROCEDURE — 80053 COMPREHEN METABOLIC PANEL: CPT

## 2019-12-09 RX ORDER — HYDROCODONE BITARTRATE AND ACETAMINOPHEN 5; 325 MG/1; MG/1
1 TABLET ORAL EVERY 6 HOURS PRN
Qty: 20 TAB | Refills: 0 | Status: SHIPPED | OUTPATIENT
Start: 2019-12-09 | End: 2019-12-12

## 2019-12-09 RX ORDER — HYDROCODONE BITARTRATE AND ACETAMINOPHEN 5; 325 MG/1; MG/1
1 TABLET ORAL ONCE
Status: COMPLETED | OUTPATIENT
Start: 2019-12-09 | End: 2019-12-09

## 2019-12-09 RX ADMIN — HYDROCODONE BITARTRATE AND ACETAMINOPHEN 1 TABLET: 5; 325 TABLET ORAL at 21:23

## 2019-12-09 ASSESSMENT — PAIN SCALES - WONG BAKER: WONGBAKER_NUMERICALRESPONSE: HURTS A WHOLE LOT

## 2019-12-10 NOTE — PATIENT INSTRUCTIONS
I would recommend immediate evaluation in the emergency department setting to work-up your onset of shoulder pain without any recent injury and also hypoxia which is a low oxygenation level in your blood.    Please go as soon as possible to the nearest emergency department for further evaluation

## 2019-12-10 NOTE — DISCHARGE INSTRUCTIONS
Use the arm sling if you find it to be more comfortable but you do not have to use it if it does not help.  Call the orthopedic clinic and arrange follow-up and further evaluation during the week

## 2019-12-10 NOTE — ED TRIAGE NOTES
"Chief Complaint   Patient presents with   • Shoulder Pain     pt sent from  for hypoxia and pain to L shoulder for 6 days. pt denies any trauma. Room air 91 %     /67   Pulse 84   Temp 37.1 °C (98.7 °F) (Temporal)   Resp 20   Ht 1.626 m (5' 4\")   Wt 63 kg (138 lb 14.2 oz)   SpO2 91%   BMI 23.84 kg/m²     "

## 2019-12-10 NOTE — ED PROVIDER NOTES
ED Provider Note    CHIEF COMPLAINT  Chief Complaint   Patient presents with   • Shoulder Pain     pt sent from  for hypoxia and pain to L shoulder for 6 days. pt denies any trauma. Room air 91 %       HPI  Jewell Diaz is a 79 y.o. female who presents to the emergency department complaining of left shoulder pain for 6 days and apparently she went to an urgent care and was told that her oxygen level was low.  The patient says that the oxygen level is measured on her finger and notably she has very dense narrow polish with glitter on the nails.  The patient thinks that she has arthritis in her shoulder she does not recall any specific trauma or precipitating event    REVIEW OF SYSTEMS no midsternal chest pain no shortness of breath or hemoptysis.  No fever or chills.  No trauma.  All other systems negative    PAST MEDICAL HISTORY  Past Medical History:   Diagnosis Date   • CATARACT 2009    BILATERAL REMOVAL    • Diabetes 2007    diet,oral meds   • DJD (degenerative joint disease) of thoracic spine    • Hypertension    • Pain     legs,back,pain scale 6       FAMILY HISTORY  Family History   Problem Relation Age of Onset   • Cancer Sister         breast       SOCIAL HISTORY  Social History     Socioeconomic History   • Marital status:      Spouse name: Not on file   • Number of children: Not on file   • Years of education: Not on file   • Highest education level: Not on file   Occupational History   • Not on file   Social Needs   • Financial resource strain: Not on file   • Food insecurity:     Worry: Not on file     Inability: Not on file   • Transportation needs:     Medical: Not on file     Non-medical: Not on file   Tobacco Use   • Smoking status: Never Smoker   • Smokeless tobacco: Never Used   Substance and Sexual Activity   • Alcohol use: No     Alcohol/week: 0.0 oz   • Drug use: No   • Sexual activity: Not on file   Lifestyle   • Physical activity:     Days per week: Not on file      "Minutes per session: Not on file   • Stress: Not on file   Relationships   • Social connections:     Talks on phone: Not on file     Gets together: Not on file     Attends [employer] service: Not on file     Active member of club or organization: Not on file     Attends meetings of clubs or organizations: Not on file     Relationship status: Not on file   • Intimate partner violence:     Fear of current or ex partner: Not on file     Emotionally abused: Not on file     Physically abused: Not on file     Forced sexual activity: Not on file   Other Topics Concern   • Not on file   Social History Narrative   • Not on file       SURGICAL HISTORY  Past Surgical History:   Procedure Laterality Date   • SHOSHANA BY LAPAROSCOPY  8/21/2012    Performed by CAMMIE HUGHES at SURGERY [facility]   • LUMBAR FUSION POSTERIOR  5/6/2010    Performed by CASSI RESTREPO at SURGERY [facility] ORS   • LUMBAR DECOMPRESSION  5/6/2010    Performed by CASSI RESTREPO at SURGERY [facility]   • LUMBAR LAMINECTOMY DISKECTOMY  12/26/2009    Performed by CASSI RESTREPO at SURGERY [facility]   • CATARACT EXT W/IOL  6/2009    bilateral   • VAGINAL HYSTERECTOMY TOTAL  1976    Hysterectomy,Total Vaginal       CURRENT MEDICATIONS  Home Medications    **Home medications have not yet been reviewed for this encounter**         ALLERGIES  Allergies   Allergen Reactions   • Nkda [No Known Drug Allergy]        PHYSICAL EXAM  VITAL SIGNS: /67   Pulse 84   Temp 37.1 °C (98.7 °F) (Temporal)   Resp 20   Ht 1.626 m (5' 4\")   Wt 63 kg (138 lb 14.2 oz)   SpO2 91%   BMI 23.84 kg/m²    Oxygen saturation is interpreted as adequate here with an oxygen probe on her ear she is in the mid 90s  Constitutional: Awake verbal she does not appear toxic or distressed or short of breath  HENT: Mucous membranes are moist  Eyes: No erythema discharge or jaundice  Neck: Trachea midline no JVD  Cardiovascular: Regular rate and rhythm  Lungs: Clear and equal " bilaterally with no apparent difficulty breathing  Skin: Warm and dry  Musculoskeletal: There is no acute bony deformity the patient is tender over the deltoid musculature there is no sulcus sign range of motion is uncomfortable for the patient but adequate.  There is no rash or vesicles erythema swelling no apparent effusion  Neurologic: Awake verbal moving the other extremities without difficulty    Laboratory  CBC shows white blood cell count of 10.1 hemoglobin is adequate 13.4 basic metabolic panel is unremarkable except for an elevated blood glucose of 181 LFTs are unremarkable troponin is normal BNP is normal    Radiology  DX-SHOULDER 2+ LEFT   Final Result      1.  No evidence of acute fracture or dislocation.      2.  Degenerative change of the glenohumeral and acromioclavicular joints. There is a possible small glenohumeral loose body versus chondrocalcinosis..      DX-CHEST-PORTABLE (1 VIEW)   Final Result      No acute cardiopulmonary disease.            EKG  Twelve-lead EKG shows sinus rhythm 83 bpm there is no pathologic ST elevation or depression    MEDICAL DECISION MAKING and DISPOSITION  In the emergency department the patient was placed on a cardiac monitor we did continuous pulse oximetry monitoring and I checked on the patient several times her oxygen saturation is always been in the mid 90s with the oxygen probe on her ear.  I think that she likely had a false reading given that she says the probe was placed on her finger at the urgent care and she has very dense nail polish.  At this point in time I think the pain in her shoulders due to osteoarthritis and possibly there is a free foreign body within the shoulder joint according to the radiology report.  I ordered a shoulder sling for the patient and given her dose of Norco.  We have discussed risks and benefits of narcotic pain medication use and I written her prescription for Norco at home for 3 days and informed consent obtained.  The patient  is instructed to keep her arm at rest she is to call Dr. Altamirano's office in the morning and arrange orthopedic follow-up during the week    IMPRESSION  1.  Left shoulder pain         Electronically signed by: Greyson Tran, 12/9/2019 9:28 PM

## 2019-12-10 NOTE — PROGRESS NOTES
Subjective:      Jewell Diaz is a 79 y.o. female who presents with Shoulder Pain (bilateral shoulder pain x6days)            This is a new problem.  79-year-old female presenting for evaluation of left shoulder pain for the past few days.  She denies any recent injury or fall.  She denies any chest pain or trouble breathing.  She has been taking all kinds over-the-counter medication without improvement.  She denies any recent cold symptoms, cough or chills or fever.  She denies any dizziness or lightheadedness.  She denies any recent travel history.  She denies any history of DVT or PE.      Review of Systems   All other systems reviewed and are negative.      Past Medical History:   Diagnosis Date   • CATARACT 2009    BILATERAL REMOVAL    • Diabetes 2007    diet,oral meds   • DJD (degenerative joint disease) of thoracic spine    • Hypertension    • Pain     legs,back,pain scale 6          Objective:     /76   Pulse 85   Temp 37.1 °C (98.7 °F) (Temporal)   Resp 18   Wt 64.4 kg (142 lb)   SpO2 91%   BMI 24.37 kg/m²     Physical Exam  Constitutional:       General: She is not in acute distress.     Appearance: She is not ill-appearing, toxic-appearing or diaphoretic.   HENT:      Head: Normocephalic and atraumatic.      Right Ear: External ear normal.      Left Ear: External ear normal.      Nose: Nose normal.   Eyes:      Conjunctiva/sclera: Conjunctivae normal.   Cardiovascular:      Rate and Rhythm: Normal rate and regular rhythm.      Heart sounds: No murmur. No friction rub. No gallop.    Pulmonary:      Effort: Pulmonary effort is normal. No respiratory distress.      Breath sounds: No stridor. Examination of the right-lower field reveals decreased breath sounds. Examination of the left-lower field reveals decreased breath sounds. Decreased breath sounds present. No wheezing or rhonchi.      Comments: Crackles at the base bilaterally  Musculoskeletal:      Comments: Range of motion the  right shoulder fairly normal.  Decreased range of motion the left shoulder due to pain per patient.  Neurovascular intact in both upper extremities.   Skin:     General: Skin is warm.      Coloration: Skin is not jaundiced or pale.   Neurological:      Mental Status: She is alert and oriented to person, place, and time.   Psychiatric:         Mood and Affect: Mood normal.          EKG shows normal sinus rhythm, nonspecific T wave changes noted, no acute ST changes.         Assessment/Plan:   ASSESSMENT:PLAN:  1. Left shoulder pain, unspecified chronicity  - EKG - Clinic Performed    2. Hypoxia  - EKG - Clinic Performed    Based on the presentation there is certainly concern for PE or any other lung processes like effusion.  Cardiac causes cannot be ruled out either.  Recommend going to the nearest emergency department for further evaluation

## 2019-12-16 LAB — EKG IMPRESSION: NORMAL

## 2019-12-20 NOTE — ED PROVIDER NOTES
ED Provider Note    Addendum to the medical record from December 9, 2019    The BNP was ordered because the patient was sent from the urgent care with a report of hypoxia.

## 2020-01-07 ENCOUNTER — HOSPITAL ENCOUNTER (OUTPATIENT)
Dept: RADIOLOGY | Facility: MEDICAL CENTER | Age: 80
End: 2020-01-07
Attending: FAMILY MEDICINE
Payer: MEDICARE

## 2020-01-07 DIAGNOSIS — R91.1 SOLITARY PULMONARY NODULE: ICD-10-CM

## 2020-01-07 PROCEDURE — 71250 CT THORAX DX C-: CPT

## 2020-02-08 ENCOUNTER — HOSPITAL ENCOUNTER (OUTPATIENT)
Dept: RADIOLOGY | Facility: MEDICAL CENTER | Age: 80
End: 2020-02-08
Attending: PHYSICIAN ASSISTANT
Payer: MEDICARE

## 2020-02-08 ENCOUNTER — HOSPITAL ENCOUNTER (OUTPATIENT)
Dept: RADIOLOGY | Facility: MEDICAL CENTER | Age: 80
End: 2020-02-08
Attending: NURSE PRACTITIONER
Payer: MEDICARE

## 2020-02-08 DIAGNOSIS — R16.0 HEPATOMEGALY: ICD-10-CM

## 2020-02-08 DIAGNOSIS — M75.41 IMPINGEMENT SYNDROME OF RIGHT SHOULDER: ICD-10-CM

## 2020-02-08 PROCEDURE — 73221 MRI JOINT UPR EXTREM W/O DYE: CPT | Mod: RT

## 2020-02-08 PROCEDURE — 74170 CT ABD WO CNTRST FLWD CNTRST: CPT

## 2020-02-08 PROCEDURE — 700117 HCHG RX CONTRAST REV CODE 255: Performed by: NURSE PRACTITIONER

## 2020-02-08 RX ADMIN — IOHEXOL 90 ML: 350 INJECTION, SOLUTION INTRAVENOUS at 13:23

## 2020-03-03 ENCOUNTER — HOSPITAL ENCOUNTER (OUTPATIENT)
Dept: LAB | Facility: MEDICAL CENTER | Age: 80
End: 2020-03-03
Attending: FAMILY MEDICINE
Payer: MEDICARE

## 2020-03-03 ENCOUNTER — HOSPITAL ENCOUNTER (OUTPATIENT)
Dept: LAB | Facility: MEDICAL CENTER | Age: 80
End: 2020-03-03
Attending: NURSE PRACTITIONER
Payer: MEDICARE

## 2020-03-03 LAB
25(OH)D3 SERPL-MCNC: 39 NG/ML (ref 30–100)
ALBUMIN SERPL BCP-MCNC: 3.5 G/DL (ref 3.2–4.9)
ALBUMIN/GLOB SERPL: 1 G/DL
ALP SERPL-CCNC: 77 U/L (ref 30–99)
ALT SERPL-CCNC: 19 U/L (ref 2–50)
ANION GAP SERPL CALC-SCNC: 13 MMOL/L (ref 0–11.9)
AST SERPL-CCNC: 19 U/L (ref 12–45)
BASOPHILS # BLD AUTO: 0.5 % (ref 0–1.8)
BASOPHILS # BLD: 0.07 K/UL (ref 0–0.12)
BILIRUB SERPL-MCNC: 0.5 MG/DL (ref 0.1–1.5)
BUN SERPL-MCNC: 19 MG/DL (ref 8–22)
CALCIUM SERPL-MCNC: 9.7 MG/DL (ref 8.5–10.5)
CHLORIDE SERPL-SCNC: 102 MMOL/L (ref 96–112)
CO2 SERPL-SCNC: 24 MMOL/L (ref 20–33)
CREAT SERPL-MCNC: 0.81 MG/DL (ref 0.5–1.4)
CRP SERPL HS-MCNC: 24.7 MG/L (ref 0–7.5)
EOSINOPHIL # BLD AUTO: 0.21 K/UL (ref 0–0.51)
EOSINOPHIL NFR BLD: 1.5 % (ref 0–6.9)
ERYTHROCYTE [DISTWIDTH] IN BLOOD BY AUTOMATED COUNT: 47.8 FL (ref 35.9–50)
ERYTHROCYTE [SEDIMENTATION RATE] IN BLOOD BY WESTERGREN METHOD: 56 MM/HOUR (ref 0–30)
EST. AVERAGE GLUCOSE BLD GHB EST-MCNC: 192 MG/DL
FASTING STATUS PATIENT QL REPORTED: NORMAL
GLOBULIN SER CALC-MCNC: 3.6 G/DL (ref 1.9–3.5)
GLUCOSE SERPL-MCNC: 179 MG/DL (ref 65–99)
HBA1C MFR BLD: 8.3 % (ref 0–5.6)
HCT VFR BLD AUTO: 40.7 % (ref 37–47)
HGB BLD-MCNC: 13 G/DL (ref 12–16)
IMM GRANULOCYTES # BLD AUTO: 0.09 K/UL (ref 0–0.11)
IMM GRANULOCYTES NFR BLD AUTO: 0.6 % (ref 0–0.9)
LYMPHOCYTES # BLD AUTO: 2.26 K/UL (ref 1–4.8)
LYMPHOCYTES NFR BLD: 15.7 % (ref 22–41)
MCH RBC QN AUTO: 30.4 PG (ref 27–33)
MCHC RBC AUTO-ENTMCNC: 31.9 G/DL (ref 33.6–35)
MCV RBC AUTO: 95.1 FL (ref 81.4–97.8)
MONOCYTES # BLD AUTO: 1.14 K/UL (ref 0–0.85)
MONOCYTES NFR BLD AUTO: 7.9 % (ref 0–13.4)
NEUTROPHILS # BLD AUTO: 10.62 K/UL (ref 2–7.15)
NEUTROPHILS NFR BLD: 73.8 % (ref 44–72)
NRBC # BLD AUTO: 0 K/UL
NRBC BLD-RTO: 0 /100 WBC
PLATELET # BLD AUTO: 326 K/UL (ref 164–446)
PMV BLD AUTO: 9.8 FL (ref 9–12.9)
POTASSIUM SERPL-SCNC: 4.1 MMOL/L (ref 3.6–5.5)
PROT SERPL-MCNC: 7.1 G/DL (ref 6–8.2)
RBC # BLD AUTO: 4.28 M/UL (ref 4.2–5.4)
RHEUMATOID FACT SER IA-ACNC: 59 IU/ML (ref 0–14)
SODIUM SERPL-SCNC: 139 MMOL/L (ref 135–145)
URATE SERPL-MCNC: 5.5 MG/DL (ref 1.9–8.2)
WBC # BLD AUTO: 14.4 K/UL (ref 4.8–10.8)

## 2020-03-03 PROCEDURE — 86038 ANTINUCLEAR ANTIBODIES: CPT

## 2020-03-03 PROCEDURE — 36415 COLL VENOUS BLD VENIPUNCTURE: CPT

## 2020-03-03 PROCEDURE — 86431 RHEUMATOID FACTOR QUANT: CPT

## 2020-03-03 PROCEDURE — 86141 C-REACTIVE PROTEIN HS: CPT

## 2020-03-03 PROCEDURE — 86200 CCP ANTIBODY: CPT

## 2020-03-03 PROCEDURE — 86747 PARVOVIRUS ANTIBODY: CPT | Mod: 91

## 2020-03-03 PROCEDURE — 84550 ASSAY OF BLOOD/URIC ACID: CPT

## 2020-03-03 PROCEDURE — 82306 VITAMIN D 25 HYDROXY: CPT

## 2020-03-03 PROCEDURE — 85652 RBC SED RATE AUTOMATED: CPT

## 2020-03-03 PROCEDURE — 85025 COMPLETE CBC W/AUTO DIFF WBC: CPT

## 2020-03-03 PROCEDURE — 83036 HEMOGLOBIN GLYCOSYLATED A1C: CPT

## 2020-03-03 PROCEDURE — 80053 COMPREHEN METABOLIC PANEL: CPT

## 2020-03-05 LAB
B19V IGG SER IA-ACNC: 5.99 IV
B19V IGM SER IA-ACNC: 0.1 IV
CCP IGG SERPL-ACNC: 193 UNITS (ref 0–19)
NUCLEAR IGG SER QL IA: NORMAL

## 2020-05-27 ENCOUNTER — HOSPITAL ENCOUNTER (OUTPATIENT)
Dept: RADIOLOGY | Facility: MEDICAL CENTER | Age: 80
End: 2020-05-27
Attending: INTERNAL MEDICINE
Payer: MEDICARE

## 2020-05-27 DIAGNOSIS — M79.671 PAIN IN BOTH FEET: ICD-10-CM

## 2020-05-27 DIAGNOSIS — M79.672 PAIN IN BOTH FEET: ICD-10-CM

## 2020-05-27 DIAGNOSIS — M79.642 BILATERAL HAND PAIN: ICD-10-CM

## 2020-05-27 DIAGNOSIS — M79.641 BILATERAL HAND PAIN: ICD-10-CM

## 2020-05-27 PROCEDURE — 77077 JOINT SURVEY SINGLE VIEW: CPT

## 2020-06-16 ENCOUNTER — HOSPITAL ENCOUNTER (OUTPATIENT)
Dept: LAB | Facility: MEDICAL CENTER | Age: 80
End: 2020-06-16
Attending: FAMILY MEDICINE
Payer: MEDICARE

## 2020-06-16 LAB
ALBUMIN SERPL BCP-MCNC: 3.9 G/DL (ref 3.2–4.9)
ALBUMIN/GLOB SERPL: 1.4 G/DL
ALP SERPL-CCNC: 69 U/L (ref 30–99)
ALT SERPL-CCNC: 22 U/L (ref 2–50)
ANION GAP SERPL CALC-SCNC: 14 MMOL/L (ref 7–16)
AST SERPL-CCNC: 23 U/L (ref 12–45)
BILIRUB SERPL-MCNC: 0.4 MG/DL (ref 0.1–1.5)
BUN SERPL-MCNC: 21 MG/DL (ref 8–22)
CALCIUM SERPL-MCNC: 9.6 MG/DL (ref 8.5–10.5)
CHLORIDE SERPL-SCNC: 101 MMOL/L (ref 96–112)
CHOLEST SERPL-MCNC: 145 MG/DL (ref 100–199)
CO2 SERPL-SCNC: 24 MMOL/L (ref 20–33)
CREAT SERPL-MCNC: 0.69 MG/DL (ref 0.5–1.4)
FASTING STATUS PATIENT QL REPORTED: NORMAL
GLOBULIN SER CALC-MCNC: 2.8 G/DL (ref 1.9–3.5)
GLUCOSE SERPL-MCNC: 153 MG/DL (ref 65–99)
HDLC SERPL-MCNC: 61 MG/DL
LDLC SERPL CALC-MCNC: 69 MG/DL
POTASSIUM SERPL-SCNC: 4.3 MMOL/L (ref 3.6–5.5)
PROT SERPL-MCNC: 6.7 G/DL (ref 6–8.2)
SODIUM SERPL-SCNC: 139 MMOL/L (ref 135–145)
TRIGL SERPL-MCNC: 76 MG/DL (ref 0–149)
VIT B12 SERPL-MCNC: 330 PG/ML (ref 211–911)

## 2020-06-16 PROCEDURE — 36415 COLL VENOUS BLD VENIPUNCTURE: CPT

## 2020-06-16 PROCEDURE — 82607 VITAMIN B-12: CPT

## 2020-06-16 PROCEDURE — 80053 COMPREHEN METABOLIC PANEL: CPT

## 2020-06-16 PROCEDURE — 83036 HEMOGLOBIN GLYCOSYLATED A1C: CPT

## 2020-06-16 PROCEDURE — 80061 LIPID PANEL: CPT

## 2020-06-17 LAB
EST. AVERAGE GLUCOSE BLD GHB EST-MCNC: 177 MG/DL
HBA1C MFR BLD: 7.8 % (ref 0–5.6)

## 2020-07-02 ENCOUNTER — HOSPITAL ENCOUNTER (OUTPATIENT)
Dept: RADIOLOGY | Facility: MEDICAL CENTER | Age: 80
End: 2020-07-02
Attending: FAMILY MEDICINE
Payer: MEDICARE

## 2020-07-02 DIAGNOSIS — M85.89 OTHER SPECIFIED DISORDERS OF BONE DENSITY AND STRUCTURE, MULTIPLE SITES: ICD-10-CM

## 2020-07-02 PROCEDURE — 77080 DXA BONE DENSITY AXIAL: CPT

## 2020-07-14 ENCOUNTER — HOSPITAL ENCOUNTER (OUTPATIENT)
Dept: RADIOLOGY | Facility: MEDICAL CENTER | Age: 80
DRG: 473 | End: 2020-07-14
Attending: NEUROLOGICAL SURGERY | Admitting: NEUROLOGICAL SURGERY
Payer: MEDICARE

## 2020-07-14 DIAGNOSIS — Z01.812 PRE-PROCEDURAL LABORATORY EXAMINATION: ICD-10-CM

## 2020-07-14 DIAGNOSIS — Z01.811 PRE-OPERATIVE RESPIRATORY EXAMINATION: ICD-10-CM

## 2020-07-14 DIAGNOSIS — Z01.810 PRE-OPERATIVE CARDIOVASCULAR EXAMINATION: ICD-10-CM

## 2020-07-14 LAB
ANION GAP SERPL CALC-SCNC: 13 MMOL/L (ref 7–16)
APPEARANCE UR: CLEAR
APTT PPP: 29.2 SEC (ref 24.7–36)
BACTERIA #/AREA URNS HPF: NEGATIVE /HPF
BASOPHILS # BLD AUTO: 0.4 % (ref 0–1.8)
BASOPHILS # BLD: 0.04 K/UL (ref 0–0.12)
BILIRUB UR QL STRIP.AUTO: NEGATIVE
BUN SERPL-MCNC: 24 MG/DL (ref 8–22)
CALCIUM SERPL-MCNC: 10.1 MG/DL (ref 8.5–10.5)
CHLORIDE SERPL-SCNC: 102 MMOL/L (ref 96–112)
CO2 SERPL-SCNC: 25 MMOL/L (ref 20–33)
COLOR UR: YELLOW
CREAT SERPL-MCNC: 0.76 MG/DL (ref 0.5–1.4)
EKG IMPRESSION: NORMAL
EOSINOPHIL # BLD AUTO: 0.12 K/UL (ref 0–0.51)
EOSINOPHIL NFR BLD: 1.3 % (ref 0–6.9)
EPI CELLS #/AREA URNS HPF: NEGATIVE /HPF
ERYTHROCYTE [DISTWIDTH] IN BLOOD BY AUTOMATED COUNT: 55.8 FL (ref 35.9–50)
GLUCOSE SERPL-MCNC: 139 MG/DL (ref 65–99)
GLUCOSE UR STRIP.AUTO-MCNC: NEGATIVE MG/DL
HCT VFR BLD AUTO: 41.4 % (ref 37–47)
HGB BLD-MCNC: 13.4 G/DL (ref 12–16)
HYALINE CASTS #/AREA URNS LPF: NORMAL /LPF
IMM GRANULOCYTES # BLD AUTO: 0.04 K/UL (ref 0–0.11)
IMM GRANULOCYTES NFR BLD AUTO: 0.4 % (ref 0–0.9)
INR PPP: 0.84 (ref 0.87–1.13)
KETONES UR STRIP.AUTO-MCNC: NEGATIVE MG/DL
LEUKOCYTE ESTERASE UR QL STRIP.AUTO: ABNORMAL
LYMPHOCYTES # BLD AUTO: 1.91 K/UL (ref 1–4.8)
LYMPHOCYTES NFR BLD: 21.1 % (ref 22–41)
MCH RBC QN AUTO: 31.6 PG (ref 27–33)
MCHC RBC AUTO-ENTMCNC: 32.4 G/DL (ref 33.6–35)
MCV RBC AUTO: 97.6 FL (ref 81.4–97.8)
MICRO URNS: ABNORMAL
MONOCYTES # BLD AUTO: 0.69 K/UL (ref 0–0.85)
MONOCYTES NFR BLD AUTO: 7.6 % (ref 0–13.4)
NEUTROPHILS # BLD AUTO: 6.25 K/UL (ref 2–7.15)
NEUTROPHILS NFR BLD: 69.2 % (ref 44–72)
NITRITE UR QL STRIP.AUTO: NEGATIVE
NRBC # BLD AUTO: 0 K/UL
NRBC BLD-RTO: 0 /100 WBC
PH UR STRIP.AUTO: 7.5 [PH] (ref 5–8)
PLATELET # BLD AUTO: 251 K/UL (ref 164–446)
PMV BLD AUTO: 9.3 FL (ref 9–12.9)
POTASSIUM SERPL-SCNC: 5 MMOL/L (ref 3.6–5.5)
PROT UR QL STRIP: NEGATIVE MG/DL
PROTHROMBIN TIME: 11.8 SEC (ref 12–14.6)
RBC # BLD AUTO: 4.24 M/UL (ref 4.2–5.4)
RBC # URNS HPF: NORMAL /HPF
RBC UR QL AUTO: NEGATIVE
SODIUM SERPL-SCNC: 140 MMOL/L (ref 135–145)
SP GR UR STRIP.AUTO: 1.01
UROBILINOGEN UR STRIP.AUTO-MCNC: 0.2 MG/DL
WBC # BLD AUTO: 9.1 K/UL (ref 4.8–10.8)
WBC #/AREA URNS HPF: NORMAL /HPF

## 2020-07-14 PROCEDURE — 71045 X-RAY EXAM CHEST 1 VIEW: CPT

## 2020-07-14 PROCEDURE — 85610 PROTHROMBIN TIME: CPT

## 2020-07-14 PROCEDURE — 80048 BASIC METABOLIC PNL TOTAL CA: CPT

## 2020-07-14 PROCEDURE — 81001 URINALYSIS AUTO W/SCOPE: CPT

## 2020-07-14 PROCEDURE — 36415 COLL VENOUS BLD VENIPUNCTURE: CPT

## 2020-07-14 PROCEDURE — 85025 COMPLETE CBC W/AUTO DIFF WBC: CPT

## 2020-07-14 PROCEDURE — 85730 THROMBOPLASTIN TIME PARTIAL: CPT

## 2020-07-14 PROCEDURE — 93010 ELECTROCARDIOGRAM REPORT: CPT | Performed by: INTERNAL MEDICINE

## 2020-07-14 PROCEDURE — 93005 ELECTROCARDIOGRAM TRACING: CPT

## 2020-07-14 RX ORDER — SPIRONOLACTONE 25 MG/1
25 TABLET ORAL 2 TIMES DAILY
COMMUNITY
End: 2022-07-12 | Stop reason: SDUPTHER

## 2020-07-14 RX ORDER — ATORVASTATIN CALCIUM 20 MG/1
40 TABLET, FILM COATED ORAL DAILY
COMMUNITY
End: 2021-01-08

## 2020-07-14 RX ORDER — FOLIC ACID 1 MG/1
1 TABLET ORAL DAILY
COMMUNITY

## 2020-07-14 RX ORDER — MELOXICAM 7.5 MG/1
15 TABLET ORAL DAILY
Status: ON HOLD | COMMUNITY
End: 2020-07-21

## 2020-07-14 ASSESSMENT — FIBROSIS 4 INDEX: FIB4 SCORE: 1.17

## 2020-07-16 ENCOUNTER — OFFICE VISIT (OUTPATIENT)
Dept: ADMISSIONS | Facility: MEDICAL CENTER | Age: 80
DRG: 473 | End: 2020-07-16
Attending: NEUROLOGICAL SURGERY
Payer: MEDICARE

## 2020-07-16 DIAGNOSIS — Z01.812 PRE-OPERATIVE LABORATORY EXAMINATION: ICD-10-CM

## 2020-07-16 LAB — COVID ORDER STATUS COVID19: NORMAL

## 2020-07-16 PROCEDURE — U0003 INFECTIOUS AGENT DETECTION BY NUCLEIC ACID (DNA OR RNA); SEVERE ACUTE RESPIRATORY SYNDROME CORONAVIRUS 2 (SARS-COV-2) (CORONAVIRUS DISEASE [COVID-19]), AMPLIFIED PROBE TECHNIQUE, MAKING USE OF HIGH THROUGHPUT TECHNOLOGIES AS DESCRIBED BY CMS-2020-01-R: HCPCS

## 2020-07-17 LAB
SARS-COV-2 RNA RESP QL NAA+PROBE: NOTDETECTED
SPECIMEN SOURCE: NORMAL

## 2020-07-19 NOTE — PROGRESS NOTES
COVID-19 Pre-Surgery Screenin. Do you have an undiagnosed respiratory illness or symptoms such as coughing or sneezing? NO     • Onset of Sx: NO    • Acute vs. chronic respiratory illness: NO     2. Do you have an unexplained fever greater than 100.4 degrees Fahrenheit or 38 degrees Celsius? NO  ?  3. Have you had direct exposure to a patient who tested positive for Covid-19? NO    4. Have you traveled outside of [place] within the last 14 days? NO    5. Patient informed of current visitation and mask policies by this RN.

## 2020-07-20 ENCOUNTER — ANESTHESIA (OUTPATIENT)
Dept: SURGERY | Facility: MEDICAL CENTER | Age: 80
DRG: 473 | End: 2020-07-20
Payer: MEDICARE

## 2020-07-20 ENCOUNTER — ANESTHESIA EVENT (OUTPATIENT)
Dept: SURGERY | Facility: MEDICAL CENTER | Age: 80
DRG: 473 | End: 2020-07-20
Payer: MEDICARE

## 2020-07-20 ENCOUNTER — APPOINTMENT (OUTPATIENT)
Dept: RADIOLOGY | Facility: MEDICAL CENTER | Age: 80
DRG: 473 | End: 2020-07-20
Attending: NEUROLOGICAL SURGERY
Payer: MEDICARE

## 2020-07-20 ENCOUNTER — HOSPITAL ENCOUNTER (INPATIENT)
Facility: MEDICAL CENTER | Age: 80
LOS: 1 days | DRG: 473 | End: 2020-07-21
Attending: NEUROLOGICAL SURGERY | Admitting: NEUROLOGICAL SURGERY
Payer: MEDICARE

## 2020-07-20 DIAGNOSIS — G89.18 ACUTE POSTOPERATIVE PAIN: ICD-10-CM

## 2020-07-20 LAB
GLUCOSE BLD-MCNC: 125 MG/DL (ref 65–99)
GLUCOSE BLD-MCNC: 210 MG/DL (ref 65–99)
GLUCOSE BLD-MCNC: 261 MG/DL (ref 65–99)

## 2020-07-20 PROCEDURE — 700111 HCHG RX REV CODE 636 W/ 250 OVERRIDE (IP): Performed by: ANESTHESIOLOGY

## 2020-07-20 PROCEDURE — A9270 NON-COVERED ITEM OR SERVICE: HCPCS | Performed by: ANESTHESIOLOGY

## 2020-07-20 PROCEDURE — 700105 HCHG RX REV CODE 258: Performed by: NEUROLOGICAL SURGERY

## 2020-07-20 PROCEDURE — 501838 HCHG SUTURE GENERAL: Performed by: NEUROLOGICAL SURGERY

## 2020-07-20 PROCEDURE — 110454 HCHG SHELL REV 250: Performed by: NEUROLOGICAL SURGERY

## 2020-07-20 PROCEDURE — 700101 HCHG RX REV CODE 250: Performed by: NEUROLOGICAL SURGERY

## 2020-07-20 PROCEDURE — C1713 ANCHOR/SCREW BN/BN,TIS/BN: HCPCS | Performed by: NEUROLOGICAL SURGERY

## 2020-07-20 PROCEDURE — 160035 HCHG PACU - 1ST 60 MINS PHASE I: Performed by: NEUROLOGICAL SURGERY

## 2020-07-20 PROCEDURE — 700102 HCHG RX REV CODE 250 W/ 637 OVERRIDE(OP): Performed by: ANESTHESIOLOGY

## 2020-07-20 PROCEDURE — A9270 NON-COVERED ITEM OR SERVICE: HCPCS | Performed by: PHYSICIAN ASSISTANT

## 2020-07-20 PROCEDURE — 700111 HCHG RX REV CODE 636 W/ 250 OVERRIDE (IP): Performed by: PHYSICIAN ASSISTANT

## 2020-07-20 PROCEDURE — 0RB30ZZ EXCISION OF CERVICAL VERTEBRAL DISC, OPEN APPROACH: ICD-10-PCS | Performed by: NEUROLOGICAL SURGERY

## 2020-07-20 PROCEDURE — 700112 HCHG RX REV CODE 229: Performed by: PHYSICIAN ASSISTANT

## 2020-07-20 PROCEDURE — 160002 HCHG RECOVERY MINUTES (STAT): Performed by: NEUROLOGICAL SURGERY

## 2020-07-20 PROCEDURE — 160029 HCHG SURGERY MINUTES - 1ST 30 MINS LEVEL 4: Performed by: NEUROLOGICAL SURGERY

## 2020-07-20 PROCEDURE — 160009 HCHG ANES TIME/MIN: Performed by: NEUROLOGICAL SURGERY

## 2020-07-20 PROCEDURE — 82962 GLUCOSE BLOOD TEST: CPT

## 2020-07-20 PROCEDURE — 500864 HCHG NEEDLE, SPINAL 18G: Performed by: NEUROLOGICAL SURGERY

## 2020-07-20 PROCEDURE — A9270 NON-COVERED ITEM OR SERVICE: HCPCS | Performed by: NEUROLOGICAL SURGERY

## 2020-07-20 PROCEDURE — 500371 HCHG DRAIN, BLAKE 10MM: Performed by: NEUROLOGICAL SURGERY

## 2020-07-20 PROCEDURE — 160048 HCHG OR STATISTICAL LEVEL 1-5: Performed by: NEUROLOGICAL SURGERY

## 2020-07-20 PROCEDURE — 770006 HCHG ROOM/CARE - MED/SURG/GYN SEMI*

## 2020-07-20 PROCEDURE — 72040 X-RAY EXAM NECK SPINE 2-3 VW: CPT

## 2020-07-20 PROCEDURE — 160041 HCHG SURGERY MINUTES - EA ADDL 1 MIN LEVEL 4: Performed by: NEUROLOGICAL SURGERY

## 2020-07-20 PROCEDURE — 700101 HCHG RX REV CODE 250: Performed by: PHYSICIAN ASSISTANT

## 2020-07-20 PROCEDURE — 500389 HCHG DRAIN, RESERVOIR SUCT JP 100CC: Performed by: NEUROLOGICAL SURGERY

## 2020-07-20 PROCEDURE — 502000 HCHG MISC OR IMPLANTS RC 0278: Performed by: NEUROLOGICAL SURGERY

## 2020-07-20 PROCEDURE — 700101 HCHG RX REV CODE 250: Performed by: ANESTHESIOLOGY

## 2020-07-20 PROCEDURE — 700102 HCHG RX REV CODE 250 W/ 637 OVERRIDE(OP): Performed by: NEUROLOGICAL SURGERY

## 2020-07-20 PROCEDURE — 0RG20A0 FUSION OF 2 OR MORE CERVICAL VERTEBRAL JOINTS WITH INTERBODY FUSION DEVICE, ANTERIOR APPROACH, ANTERIOR COLUMN, OPEN APPROACH: ICD-10-PCS | Performed by: NEUROLOGICAL SURGERY

## 2020-07-20 PROCEDURE — 700102 HCHG RX REV CODE 250 W/ 637 OVERRIDE(OP): Performed by: PHYSICIAN ASSISTANT

## 2020-07-20 DEVICE — GRAFT ACTIFUSE ABX PUTTY 1.5ML: Type: IMPLANTABLE DEVICE | Status: FUNCTIONAL

## 2020-07-20 DEVICE — IMPLANTABLE DEVICE: Type: IMPLANTABLE DEVICE | Status: FUNCTIONAL

## 2020-07-20 RX ORDER — LABETALOL HYDROCHLORIDE 5 MG/ML
10 INJECTION, SOLUTION INTRAVENOUS
Status: DISCONTINUED | OUTPATIENT
Start: 2020-07-20 | End: 2020-07-21 | Stop reason: HOSPADM

## 2020-07-20 RX ORDER — SODIUM CHLORIDE AND POTASSIUM CHLORIDE 150; 900 MG/100ML; MG/100ML
INJECTION, SOLUTION INTRAVENOUS CONTINUOUS
Status: DISCONTINUED | OUTPATIENT
Start: 2020-07-20 | End: 2020-07-21 | Stop reason: HOSPADM

## 2020-07-20 RX ORDER — LIDOCAINE HYDROCHLORIDE 20 MG/ML
INJECTION, SOLUTION EPIDURAL; INFILTRATION; INTRACAUDAL; PERINEURAL PRN
Status: DISCONTINUED | OUTPATIENT
Start: 2020-07-20 | End: 2020-07-20 | Stop reason: SURG

## 2020-07-20 RX ORDER — HYDROMORPHONE HYDROCHLORIDE 1 MG/ML
0.4 INJECTION, SOLUTION INTRAMUSCULAR; INTRAVENOUS; SUBCUTANEOUS
Status: DISCONTINUED | OUTPATIENT
Start: 2020-07-20 | End: 2020-07-20 | Stop reason: HOSPADM

## 2020-07-20 RX ORDER — METHOCARBAMOL 750 MG/1
750 TABLET, FILM COATED ORAL EVERY 8 HOURS PRN
Status: DISCONTINUED | OUTPATIENT
Start: 2020-07-20 | End: 2020-07-21 | Stop reason: HOSPADM

## 2020-07-20 RX ORDER — HYDROMORPHONE HYDROCHLORIDE 1 MG/ML
0.1 INJECTION, SOLUTION INTRAMUSCULAR; INTRAVENOUS; SUBCUTANEOUS
Status: DISCONTINUED | OUTPATIENT
Start: 2020-07-20 | End: 2020-07-20 | Stop reason: HOSPADM

## 2020-07-20 RX ORDER — SODIUM CHLORIDE, SODIUM LACTATE, POTASSIUM CHLORIDE, CALCIUM CHLORIDE 600; 310; 30; 20 MG/100ML; MG/100ML; MG/100ML; MG/100ML
INJECTION, SOLUTION INTRAVENOUS CONTINUOUS
Status: ACTIVE | OUTPATIENT
Start: 2020-07-20 | End: 2020-07-20

## 2020-07-20 RX ORDER — HYDROMORPHONE HYDROCHLORIDE 1 MG/ML
0.2 INJECTION, SOLUTION INTRAMUSCULAR; INTRAVENOUS; SUBCUTANEOUS
Status: DISCONTINUED | OUTPATIENT
Start: 2020-07-20 | End: 2020-07-20 | Stop reason: HOSPADM

## 2020-07-20 RX ORDER — SODIUM CHLORIDE, SODIUM LACTATE, POTASSIUM CHLORIDE, CALCIUM CHLORIDE 600; 310; 30; 20 MG/100ML; MG/100ML; MG/100ML; MG/100ML
INJECTION, SOLUTION INTRAVENOUS CONTINUOUS
Status: DISCONTINUED | OUTPATIENT
Start: 2020-07-20 | End: 2020-07-20 | Stop reason: HOSPADM

## 2020-07-20 RX ORDER — LOSARTAN POTASSIUM 50 MG/1
50 TABLET ORAL DAILY
Status: DISCONTINUED | OUTPATIENT
Start: 2020-07-20 | End: 2020-07-21 | Stop reason: HOSPADM

## 2020-07-20 RX ORDER — METFORMIN HYDROCHLORIDE 500 MG/1
500 TABLET, EXTENDED RELEASE ORAL 2 TIMES DAILY
Status: DISCONTINUED | OUTPATIENT
Start: 2020-07-20 | End: 2020-07-21 | Stop reason: HOSPADM

## 2020-07-20 RX ORDER — BISACODYL 10 MG
10 SUPPOSITORY, RECTAL RECTAL
Status: DISCONTINUED | OUTPATIENT
Start: 2020-07-20 | End: 2020-07-21 | Stop reason: HOSPADM

## 2020-07-20 RX ORDER — SIMVASTATIN 20 MG
20 TABLET ORAL NIGHTLY
Status: DISCONTINUED | OUTPATIENT
Start: 2020-07-20 | End: 2020-07-21 | Stop reason: HOSPADM

## 2020-07-20 RX ORDER — HYDRALAZINE HYDROCHLORIDE 20 MG/ML
5 INJECTION INTRAMUSCULAR; INTRAVENOUS
Status: DISCONTINUED | OUTPATIENT
Start: 2020-07-20 | End: 2020-07-20 | Stop reason: HOSPADM

## 2020-07-20 RX ORDER — ONDANSETRON 2 MG/ML
4 INJECTION INTRAMUSCULAR; INTRAVENOUS EVERY 4 HOURS PRN
Status: DISCONTINUED | OUTPATIENT
Start: 2020-07-20 | End: 2020-07-21 | Stop reason: HOSPADM

## 2020-07-20 RX ORDER — DOCUSATE SODIUM 100 MG/1
100 CAPSULE, LIQUID FILLED ORAL 2 TIMES DAILY
Status: DISCONTINUED | OUTPATIENT
Start: 2020-07-20 | End: 2020-07-21 | Stop reason: HOSPADM

## 2020-07-20 RX ORDER — DEXAMETHASONE SODIUM PHOSPHATE 4 MG/ML
4 INJECTION, SOLUTION INTRA-ARTICULAR; INTRALESIONAL; INTRAMUSCULAR; INTRAVENOUS; SOFT TISSUE EVERY 6 HOURS
Status: COMPLETED | OUTPATIENT
Start: 2020-07-20 | End: 2020-07-21

## 2020-07-20 RX ORDER — DIPHENHYDRAMINE HCL 25 MG
25 TABLET ORAL EVERY 6 HOURS PRN
Status: DISCONTINUED | OUTPATIENT
Start: 2020-07-20 | End: 2020-07-21 | Stop reason: HOSPADM

## 2020-07-20 RX ORDER — ONDANSETRON 2 MG/ML
INJECTION INTRAMUSCULAR; INTRAVENOUS PRN
Status: DISCONTINUED | OUTPATIENT
Start: 2020-07-20 | End: 2020-07-20 | Stop reason: SURG

## 2020-07-20 RX ORDER — AMOXICILLIN 250 MG
1 CAPSULE ORAL NIGHTLY
Status: DISCONTINUED | OUTPATIENT
Start: 2020-07-20 | End: 2020-07-21 | Stop reason: HOSPADM

## 2020-07-20 RX ORDER — OMEPRAZOLE 20 MG/1
20 CAPSULE, DELAYED RELEASE ORAL DAILY
Status: DISCONTINUED | OUTPATIENT
Start: 2020-07-20 | End: 2020-07-21 | Stop reason: HOSPADM

## 2020-07-20 RX ORDER — HYDROMORPHONE HYDROCHLORIDE 2 MG/ML
INJECTION, SOLUTION INTRAMUSCULAR; INTRAVENOUS; SUBCUTANEOUS PRN
Status: DISCONTINUED | OUTPATIENT
Start: 2020-07-20 | End: 2020-07-20 | Stop reason: SURG

## 2020-07-20 RX ORDER — METHOTREXATE 2.5 MG/1
10 TABLET ORAL
COMMUNITY
Start: 2020-05-27

## 2020-07-20 RX ORDER — LABETALOL HYDROCHLORIDE 5 MG/ML
5 INJECTION, SOLUTION INTRAVENOUS
Status: DISCONTINUED | OUTPATIENT
Start: 2020-07-20 | End: 2020-07-20 | Stop reason: HOSPADM

## 2020-07-20 RX ORDER — OXYCODONE HYDROCHLORIDE 5 MG/1
2.5 TABLET ORAL
Status: DISCONTINUED | OUTPATIENT
Start: 2020-07-20 | End: 2020-07-21 | Stop reason: HOSPADM

## 2020-07-20 RX ORDER — OXYCODONE HCL 5 MG/5 ML
10 SOLUTION, ORAL ORAL
Status: COMPLETED | OUTPATIENT
Start: 2020-07-20 | End: 2020-07-20

## 2020-07-20 RX ORDER — BACITRACIN 50000 [IU]/1
INJECTION, POWDER, FOR SOLUTION INTRAMUSCULAR
Status: DISCONTINUED | OUTPATIENT
Start: 2020-07-20 | End: 2020-07-20 | Stop reason: HOSPADM

## 2020-07-20 RX ORDER — ONDANSETRON 4 MG/1
4 TABLET, ORALLY DISINTEGRATING ORAL EVERY 4 HOURS PRN
Status: DISCONTINUED | OUTPATIENT
Start: 2020-07-20 | End: 2020-07-21 | Stop reason: HOSPADM

## 2020-07-20 RX ORDER — BUPIVACAINE HYDROCHLORIDE AND EPINEPHRINE 5; 5 MG/ML; UG/ML
INJECTION, SOLUTION PERINEURAL
Status: DISCONTINUED | OUTPATIENT
Start: 2020-07-20 | End: 2020-07-20 | Stop reason: HOSPADM

## 2020-07-20 RX ORDER — OXYCODONE HCL 5 MG/5 ML
5 SOLUTION, ORAL ORAL
Status: COMPLETED | OUTPATIENT
Start: 2020-07-20 | End: 2020-07-20

## 2020-07-20 RX ORDER — ATORVASTATIN CALCIUM 40 MG/1
40 TABLET, FILM COATED ORAL DAILY
Status: DISCONTINUED | OUTPATIENT
Start: 2020-07-20 | End: 2020-07-21 | Stop reason: HOSPADM

## 2020-07-20 RX ORDER — ACETAMINOPHEN 325 MG/1
650 TABLET ORAL EVERY 6 HOURS
Status: DISCONTINUED | OUTPATIENT
Start: 2020-07-20 | End: 2020-07-21 | Stop reason: HOSPADM

## 2020-07-20 RX ORDER — DEXAMETHASONE SODIUM PHOSPHATE 4 MG/ML
INJECTION, SOLUTION INTRA-ARTICULAR; INTRALESIONAL; INTRAMUSCULAR; INTRAVENOUS; SOFT TISSUE PRN
Status: DISCONTINUED | OUTPATIENT
Start: 2020-07-20 | End: 2020-07-20 | Stop reason: SURG

## 2020-07-20 RX ORDER — HYDROMORPHONE HYDROCHLORIDE 1 MG/ML
.25-.5 INJECTION, SOLUTION INTRAMUSCULAR; INTRAVENOUS; SUBCUTANEOUS
Status: DISCONTINUED | OUTPATIENT
Start: 2020-07-20 | End: 2020-07-21 | Stop reason: HOSPADM

## 2020-07-20 RX ORDER — ONDANSETRON 2 MG/ML
4 INJECTION INTRAMUSCULAR; INTRAVENOUS
Status: DISCONTINUED | OUTPATIENT
Start: 2020-07-20 | End: 2020-07-20 | Stop reason: HOSPADM

## 2020-07-20 RX ORDER — HALOPERIDOL 5 MG/ML
1 INJECTION INTRAMUSCULAR
Status: DISCONTINUED | OUTPATIENT
Start: 2020-07-20 | End: 2020-07-20 | Stop reason: HOSPADM

## 2020-07-20 RX ORDER — CEFAZOLIN SODIUM 2 G/100ML
2 INJECTION, SOLUTION INTRAVENOUS EVERY 8 HOURS
Status: COMPLETED | OUTPATIENT
Start: 2020-07-20 | End: 2020-07-21

## 2020-07-20 RX ORDER — POLYETHYLENE GLYCOL 3350 17 G/17G
1 POWDER, FOR SOLUTION ORAL 2 TIMES DAILY PRN
Status: DISCONTINUED | OUTPATIENT
Start: 2020-07-20 | End: 2020-07-21 | Stop reason: HOSPADM

## 2020-07-20 RX ORDER — OXYCODONE HYDROCHLORIDE 5 MG/1
5 TABLET ORAL
Status: DISCONTINUED | OUTPATIENT
Start: 2020-07-20 | End: 2020-07-21 | Stop reason: HOSPADM

## 2020-07-20 RX ORDER — CEFAZOLIN SODIUM 1 G/3ML
INJECTION, POWDER, FOR SOLUTION INTRAMUSCULAR; INTRAVENOUS PRN
Status: DISCONTINUED | OUTPATIENT
Start: 2020-07-20 | End: 2020-07-20 | Stop reason: SURG

## 2020-07-20 RX ORDER — ENEMA 19; 7 G/133ML; G/133ML
1 ENEMA RECTAL
Status: DISCONTINUED | OUTPATIENT
Start: 2020-07-20 | End: 2020-07-21 | Stop reason: HOSPADM

## 2020-07-20 RX ORDER — HYDRALAZINE HYDROCHLORIDE 20 MG/ML
10 INJECTION INTRAMUSCULAR; INTRAVENOUS
Status: DISCONTINUED | OUTPATIENT
Start: 2020-07-20 | End: 2020-07-21 | Stop reason: HOSPADM

## 2020-07-20 RX ORDER — SPIRONOLACTONE 25 MG/1
25 TABLET ORAL 2 TIMES DAILY
Status: DISCONTINUED | OUTPATIENT
Start: 2020-07-20 | End: 2020-07-21 | Stop reason: HOSPADM

## 2020-07-20 RX ORDER — DEXTROSE MONOHYDRATE 25 G/50ML
50 INJECTION, SOLUTION INTRAVENOUS
Status: DISCONTINUED | OUTPATIENT
Start: 2020-07-20 | End: 2020-07-21 | Stop reason: HOSPADM

## 2020-07-20 RX ORDER — ACETAMINOPHEN 500 MG
1000 TABLET ORAL ONCE
Status: COMPLETED | OUTPATIENT
Start: 2020-07-20 | End: 2020-07-20

## 2020-07-20 RX ORDER — DIPHENHYDRAMINE HYDROCHLORIDE 50 MG/ML
25 INJECTION INTRAMUSCULAR; INTRAVENOUS EVERY 6 HOURS PRN
Status: DISCONTINUED | OUTPATIENT
Start: 2020-07-20 | End: 2020-07-21 | Stop reason: HOSPADM

## 2020-07-20 RX ORDER — CALCIUM CARBONATE 500 MG/1
500 TABLET, CHEWABLE ORAL 2 TIMES DAILY
Status: DISCONTINUED | OUTPATIENT
Start: 2020-07-20 | End: 2020-07-21 | Stop reason: HOSPADM

## 2020-07-20 RX ORDER — AMOXICILLIN 250 MG
1 CAPSULE ORAL
Status: DISCONTINUED | OUTPATIENT
Start: 2020-07-20 | End: 2020-07-21 | Stop reason: HOSPADM

## 2020-07-20 RX ORDER — PHENYLEPHRINE HYDROCHLORIDE 10 MG/ML
INJECTION, SOLUTION INTRAMUSCULAR; INTRAVENOUS; SUBCUTANEOUS PRN
Status: DISCONTINUED | OUTPATIENT
Start: 2020-07-20 | End: 2020-07-20 | Stop reason: SURG

## 2020-07-20 RX ADMIN — ANTACID TABLETS 500 MG: 500 TABLET, CHEWABLE ORAL at 17:13

## 2020-07-20 RX ADMIN — FENTANYL CITRATE 100 MCG: 50 INJECTION INTRAMUSCULAR; INTRAVENOUS at 09:13

## 2020-07-20 RX ADMIN — SPIRONOLACTONE 25 MG: 25 TABLET ORAL at 17:13

## 2020-07-20 RX ADMIN — SODIUM CHLORIDE, POTASSIUM CHLORIDE, SODIUM LACTATE AND CALCIUM CHLORIDE: 600; 310; 30; 20 INJECTION, SOLUTION INTRAVENOUS at 09:09

## 2020-07-20 RX ADMIN — DEXAMETHASONE SODIUM PHOSPHATE 4 MG: 4 INJECTION, SOLUTION INTRA-ARTICULAR; INTRALESIONAL; INTRAMUSCULAR; INTRAVENOUS; SOFT TISSUE at 17:13

## 2020-07-20 RX ADMIN — POVIDONE-IODINE 15 ML: 10 SOLUTION TOPICAL at 08:30

## 2020-07-20 RX ADMIN — ATORVASTATIN CALCIUM 40 MG: 40 TABLET, FILM COATED ORAL at 15:04

## 2020-07-20 RX ADMIN — HYDROMORPHONE HYDROCHLORIDE 0.6 MG: 2 INJECTION, SOLUTION INTRAMUSCULAR; INTRAVENOUS; SUBCUTANEOUS at 09:39

## 2020-07-20 RX ADMIN — ROCURONIUM BROMIDE 60 MG: 10 INJECTION, SOLUTION INTRAVENOUS at 09:13

## 2020-07-20 RX ADMIN — PHENYLEPHRINE HYDROCHLORIDE 200 MCG: 10 INJECTION INTRAVENOUS at 09:25

## 2020-07-20 RX ADMIN — ROCURONIUM BROMIDE 10 MG: 10 INJECTION, SOLUTION INTRAVENOUS at 09:54

## 2020-07-20 RX ADMIN — OMEPRAZOLE 20 MG: 20 CAPSULE, DELAYED RELEASE ORAL at 15:04

## 2020-07-20 RX ADMIN — POTASSIUM CHLORIDE AND SODIUM CHLORIDE: 900; 150 INJECTION, SOLUTION INTRAVENOUS at 15:05

## 2020-07-20 RX ADMIN — LIDOCAINE HYDROCHLORIDE 0.5 ML: 10 INJECTION, SOLUTION EPIDURAL; INFILTRATION; INTRACAUDAL at 08:30

## 2020-07-20 RX ADMIN — DOCUSATE SODIUM 100 MG: 100 CAPSULE, LIQUID FILLED ORAL at 17:13

## 2020-07-20 RX ADMIN — SUGAMMADEX 200 MG: 100 INJECTION, SOLUTION INTRAVENOUS at 10:21

## 2020-07-20 RX ADMIN — CEFAZOLIN SODIUM 2 G: 2 INJECTION, SOLUTION INTRAVENOUS at 17:13

## 2020-07-20 RX ADMIN — LIDOCAINE HYDROCHLORIDE 100 MG: 20 INJECTION, SOLUTION EPIDURAL; INFILTRATION; INTRACAUDAL at 09:13

## 2020-07-20 RX ADMIN — INSULIN HUMAN 3 UNITS: 100 INJECTION, SOLUTION PARENTERAL at 21:33

## 2020-07-20 RX ADMIN — ACETAMINOPHEN 650 MG: 325 TABLET, FILM COATED ORAL at 17:13

## 2020-07-20 RX ADMIN — HYDROMORPHONE HYDROCHLORIDE 0.4 MG: 2 INJECTION, SOLUTION INTRAMUSCULAR; INTRAVENOUS; SUBCUTANEOUS at 10:27

## 2020-07-20 RX ADMIN — PROPOFOL 100 MG: 10 INJECTION, EMULSION INTRAVENOUS at 09:13

## 2020-07-20 RX ADMIN — CEFAZOLIN 2 G: 330 INJECTION, POWDER, FOR SOLUTION INTRAMUSCULAR; INTRAVENOUS at 09:17

## 2020-07-20 RX ADMIN — SIMVASTATIN 20 MG: 20 TABLET, FILM COATED ORAL at 21:28

## 2020-07-20 RX ADMIN — SODIUM CHLORIDE, POTASSIUM CHLORIDE, SODIUM LACTATE AND CALCIUM CHLORIDE: 600; 310; 30; 20 INJECTION, SOLUTION INTRAVENOUS at 08:31

## 2020-07-20 RX ADMIN — OXYCODONE HYDROCHLORIDE 10 MG: 5 SOLUTION ORAL at 10:41

## 2020-07-20 RX ADMIN — ONDANSETRON 4 MG: 2 INJECTION INTRAMUSCULAR; INTRAVENOUS at 10:15

## 2020-07-20 RX ADMIN — ACETAMINOPHEN 1000 MG: 500 TABLET ORAL at 08:31

## 2020-07-20 RX ADMIN — DEXAMETHASONE SODIUM PHOSPHATE 8 MG: 4 INJECTION, SOLUTION INTRA-ARTICULAR; INTRALESIONAL; INTRAMUSCULAR; INTRAVENOUS; SOFT TISSUE at 09:18

## 2020-07-20 RX ADMIN — LOSARTAN POTASSIUM 50 MG: 50 TABLET, FILM COATED ORAL at 15:06

## 2020-07-20 RX ADMIN — METFORMIN HYDROCHLORIDE 500 MG: 500 TABLET, EXTENDED RELEASE ORAL at 17:13

## 2020-07-20 ASSESSMENT — COGNITIVE AND FUNCTIONAL STATUS - GENERAL
SUGGESTED CMS G CODE MODIFIER MOBILITY: CH
DAILY ACTIVITIY SCORE: 24
SUGGESTED CMS G CODE MODIFIER DAILY ACTIVITY: CH
MOBILITY SCORE: 24

## 2020-07-20 ASSESSMENT — LIFESTYLE VARIABLES
ALCOHOL_USE: NO
EVER HAD A DRINK FIRST THING IN THE MORNING TO STEADY YOUR NERVES TO GET RID OF A HANGOVER: NO
EVER FELT BAD OR GUILTY ABOUT YOUR DRINKING: NO
HAVE PEOPLE ANNOYED YOU BY CRITICIZING YOUR DRINKING: NO
HAVE YOU EVER FELT YOU SHOULD CUT DOWN ON YOUR DRINKING: NO
TOTAL SCORE: 0
HOW MANY TIMES IN THE PAST YEAR HAVE YOU HAD 5 OR MORE DRINKS IN A DAY: 0
TOTAL SCORE: 0
ALCOHOL_USE: NO
ON A TYPICAL DAY WHEN YOU DRINK ALCOHOL HOW MANY DRINKS DO YOU HAVE: 0
AVERAGE NUMBER OF DAYS PER WEEK YOU HAVE A DRINK CONTAINING ALCOHOL: 0
EVER_SMOKED: NEVER
CONSUMPTION TOTAL: NEGATIVE
TOTAL SCORE: 0

## 2020-07-20 ASSESSMENT — FIBROSIS 4 INDEX: FIB4 SCORE: 1.52

## 2020-07-20 ASSESSMENT — PAIN SCALES - GENERAL: PAIN_LEVEL: 4

## 2020-07-20 ASSESSMENT — PATIENT HEALTH QUESTIONNAIRE - PHQ9
2. FEELING DOWN, DEPRESSED, IRRITABLE, OR HOPELESS: NOT AT ALL
1. LITTLE INTEREST OR PLEASURE IN DOING THINGS: NOT AT ALL
SUM OF ALL RESPONSES TO PHQ9 QUESTIONS 1 AND 2: 0

## 2020-07-20 NOTE — OP REPORT
DATE OF SERVICE:  07/20/2020    SURGEON:  Ezra Bailey MD    FIRST ASSISTANT:  Sarah Mishra PA-C    ANESTHESIOLOGIST:  Milton Morrison MD    ANESTHESIA:  GETA.    PREOPERATIVE DIAGNOSES:  1.  Cervical stenosis with radiculopathy, C4-C5.  2.  Cervical stenosis with radiculopathy, C5-C6.  3.  Cervicalgia.    POSTOPERATIVE DIAGNOSES:  1.  Cervical stenosis with radiculopathy, C4-C5.  2.  Cervical stenosis with radiculopathy, C5-C6.  3.  Cervicalgia.    PROCEDURES PERFORMED:  1.  Partial corpectomy, C4, greater than 50%.  2.  Partial corpectomy, C5, greater than 50%.  3.  Partial corpectomy, C6, greater than 50%.  4.  Placement of 4WEB titanium biomechanical device at C4-C5, 6 mm.  5.  Placement of 4WEB titanium intervertebral biomechanical device at C5-C6, 7   mm.  6.  Placement of RTI CervAlign anterior cervical plate affixed to the anterior   vertebral bodies at C4, C5, C6 with 16 mm self-drilling screws x6.  7.  [place] local autograft, same incision for the purposes of interbody   arthrodesis and fusion.  8.  Microscope for microscopic dissection.    INDICATIONS FOR PROCEDURE:  This is a pleasant 80-year-old female with left   greater than right upper extremity radiculopathy, neck pain due to cervical   degenerative disk disease, severe foraminal stenosis at multiple levels.  Her   symptoms did wax and wane, and looking at her neck, she did have a relatively   severe degenerative disease from C4-T1.  It is likely that the C6-C7   interspace may be implicated as well.  However, due to the spondylolisthesis   at C7-T1, including C6-C7, would have then extended her surgery to a 4,   potentially a 5-level fusion, and in order to reduce the morbidity associated   with that, we discussed a C4-C5, C5-C6 decompression and fusion in order to   help address her symptoms with the least morbidity possible.  She understood   risks, benefits, alternatives to procedure and wished to proceed.    PROCEDURE IN  DETAIL:  Patient was brought to the operating room, intubated by   the anesthesiologist.  She was placed supine on a regular OR table with a bump   under her shoulder, her head in a donut, shoulders gently affixed to the   operating room table with tape.  She was marked, prepped, draped in the usual   sterile fashion.  Preprocedure timeout was performed.  A 0.5% Marcaine with   epinephrine was infiltrated in the skin.  A 10 blade was used to sharply   incise the skin and subcutaneous tissue.  Monopolar electrocautery was used to   create a subplatysmal plane, both caudally and rostrally, which was further   extended with blunt, finger dissection.  Peanut Cloward were used to develop   the avascular plane superior to the anterior belly of the omohyoid and medial   to the plane of the sternocleidomastoid muscle.  The carotid sheath and its   contents were gently mobilized laterally.  The prevertebral fascia was opened   with monopolar electrocautery and the prevertebral space was entered.    Monopolar electrocautery was used to strip the anterolateral borders off the   vertebral bodies from the longus colli at C5, C6, as well as C4.    Self-retaining retractors were placed and a spinal needle was placed in the   C5-C6 disk space to localize the appropriate surgical levels.  [place] rongeur   was used to start partial corpectomies at C4, C5, C6, and the Blandon pins   were placed in the anterior vertebral bodies.  [place] pin distraction was   applied at C4-C5.  Monopolar electrocautery was used to circumferentially   incise the annulus.  At C4-C5, distraction was applied; however, given the   softness of the bone and severity of the degenerative disk disease, no   significant distraction was able to be obtained.  The microscope was brought   into the field.  Because there was no distraction able to be obtained,   generous partial corpectomies needed to be completed at C4 and C5.  This was   completed with a 12 mm  drilling bur, drilling down the inferior half at C4 and   superior half at C5.  This allowed me to get back, remove the final posterior   osteophytes with an 8 mm drilling bur, use a 1 mm Kerrison to transect the   posterior longitudinal ligament and a 2 mm Kerrison to resect the posterior   longitudinal ligament.  A 2 mm Kerrison was used to create a generous central   and foraminal decompression at C4-C5.  Surgiflo was used in the epidural space   for hemostasis.  Sequential trialing was completed.  A 6 mm titanium graft   was packed with morselized autograft from the generous partial corpectomies at   C4 and C5 and mixed with a small amount of Actifuse and placed in the cage   and subsequently in the C4-C5 disk space.  [place] pin was removed out of the   anterior vertebral body at C4 and the void filled with Surgiflo.  [place] pin   distraction was applied at C5-C6; however, the same situation was noted and   due to the soft bone and the severity of the collapsed disk, although a   diskectomy was performed with a curette and a pituitary, I was then able to   access and decompress the central canal and neural foramen.  That being the   case, a 12 mm drilling bur was utilized to create generous partial   corpectomies at C5 and C6, working my way down the final posterior   osteophytes, which were removed with an 8 mm drilling bob.  A 1 mm Kerrison   was used to transect the posterior longitudinal ligament, a 2 mm Kerrison was   used to resect the posterior longitudinal ligament.  Generous central and   foraminal decompression was completed with a 2 mm Kerrison.  Surgiflo was used   in the epidural space.  Sequential trialing was completed.  A 7 mm spacer was   noted to fit the space well.  Titanium permanent graft was packed with   locally harvested autograft from the generous partial corpectomies at C5 and   C6, mixed with a small amount of Actifuse and the graft was placed in the   C5-C6 disk space.  [place] pins  were removed and the void filled with Surgiflo.    [place] rongeur was used to further create corpectomies of the anterior   vertebral bodies at C4, C5, C6.  By the completion of this, greater than 50%   at C4, greater than 50% at C5 and greater than 50% at C6 respectively had been   resected related to the corpectomies.  An anterior cervical plate, RTI   CervAlign was applied to the anterior vertebral bodies at C4, C5, C6 with 16   mm self-drilling screws x6.  Secondary locking mechanisms were utilized.    Final AP and lateral fluoroscopy confirmed good hardware placement in the   appropriate surgical level.  The wound was irrigated again.  A drain was   tunneled through a separate stab incision, affixed to the skin and placed in   the prevertebral space.  The wound was meticulously closed in layers.    Steri-Strips were applied to skin edges.  Sterile dressing was applied.    Patient was extubated in the operating room, taken to PACU in stable   condition.    ESTIMATED BLOOD LOSS:  25 mL.    COMPLICATIONS:  None noted.       ____________________________________     Ezra Baliey MD SA / FRANDY    DD:  07/20/2020 10:35:29  DT:  07/20/2020 11:39:18    D#:  [id number]  Job#:  [id number]

## 2020-07-20 NOTE — ANESTHESIA PREPROCEDURE EVALUATION
Relevant Problems   CARDIAC   (+) Hypertension     DM    Physical Exam    Airway   Mallampati: II  TM distance: >3 FB  Neck ROM: full       Cardiovascular - normal exam  Rhythm: regular  Rate: normal  (-) murmur     Dental - normal exam           Pulmonary - normal exam  Breath sounds clear to auscultation     Abdominal    Neurological - normal exam                 Anesthesia Plan    ASA 2       Plan - general       Airway plan will be ETT        Induction: intravenous and rapid sequence    Postoperative Plan: Postoperative administration of opioids is intended.    Pertinent diagnostic labs and testing reviewed    Informed Consent:    Anesthetic plan and risks discussed with patient.    Use of blood products discussed with: patient whom consented to blood products.

## 2020-07-20 NOTE — OR SURGEON
Immediate Post OP Note    PreOp Diagnosis: Cervical stenosis C4-6, Cervical radiculopathy, Cervicalgia    PostOp Diagnosis: Same    Procedure(s):  DISCECTOMY, SPINE, CERVICAL, ANTERIOR APPROACH, WITH FUSION- C4-6 - Wound Class: Clean    Surgeon(s):  Ezra Bailey M.D.    Anesthesiologist/Type of Anesthesia:  Anesthesiologist: Milton Morrison M.D./General    Surgical Staff:  Circulator: Bob Monk R.N.  Relief Circulator: Diann Dickinson R.N.  Scrub Person: Judith Owens  Radiology Technologist: Kandi Gordon    Specimens removed if any:  * No specimens in log *    Estimated Blood Loss: 25ccs    Findings: See op report    Complications: None        7/20/2020 10:34 AM Sarah Mishra P.A.-C.

## 2020-07-20 NOTE — ANESTHESIA POSTPROCEDURE EVALUATION
Patient: Jewell Diaz    Procedure Summary     Date:  07/20/20 Room / Location:  [facility] OR 07 / SURGERY [facility]    Anesthesia Start:  0909 Anesthesia Stop:  1034    Procedure:  DISCECTOMY, SPINE, CERVICAL, ANTERIOR APPROACH, WITH FUSION- C4-6 (Spine Cervical) Diagnosis:  (CERVICAL STENOSIS AND DISC DISORDER WITH RADICULOPATHY)    Surgeon:  Ezra Bailey M.D. Responsible Provider:  Milton Morrison M.D.    Anesthesia Type:  general ASA Status:  2          Final Anesthesia Type: general  Last vitals  BP   Blood Pressure : (!) 161/68    Temp   36.6 °C (97.9 °F)    Pulse   Pulse: 80   Resp   13    SpO2   97 %      Anesthesia Post Evaluation    Patient location during evaluation: PACU  Patient participation: complete - patient participated  Level of consciousness: awake and alert  Pain score: 4    Airway patency: patent  Anesthetic complications: no  Cardiovascular status: hemodynamically stable  Respiratory status: acceptable  Hydration status: euvolemic    PONV: none           Nurse Pain Score: 4 (NPRS)

## 2020-07-20 NOTE — ANESTHESIA PROCEDURE NOTES
Airway    Date/Time: 7/20/2020 9:13 AM  Performed by: Milton Morrison M.D.  Authorized by: Milton Morrison M.D.     Location:  OR  Urgency:  Elective  Indications for Airway Management:  Anesthesia      Spontaneous Ventilation: absent    Sedation Level:  Deep  Preoxygenated: Yes    Patient Position:  Sniffing  Mask Difficulty Assessment:  0 - not attempted  Final Airway Type:  Endotracheal airway  Final Endotracheal Airway:  ETT  Cuffed: Yes    Technique Used for Successful ETT Placement:  Direct laryngoscopy    Insertion Site:  Oral  Blade Type:  Shar  Laryngoscope Blade/Videolaryngoscope Blade Size:  3  ETT Size (mm):  7.0  Measured from:  Teeth  ETT to Teeth (cm):  21  Placement Verified by: auscultation and capnometry    Cormack-Lehane Classification:  Grade IIa - partial view of glottis  Number of Attempts at Approach:  1

## 2020-07-20 NOTE — ANESTHESIA QCDR
2019 [facility] Clinical Data Registry (for Quality Improvement)     Postoperative nausea/vomiting risk protocol (Adult = 18 yrs and Pediatric 3-17 yrs)- (430 and 463)  General inhalation anesthetic (NOT TIVA) with PONV risk factors: No  Provision of anti-emetic therapy with at least 2 different classes of agents: N/A  Patient DID NOT receive anti-emetic therapy and reason is documented in Medical Record: N/A    Multimodal Pain Management- (477)  Non-emergent surgery AND patient age >= 18: Yes  Use of Multimodal Pain Management, two or more drugs and/or interventions, NOT including systemic opioids: Yes  Exception: Documented allergy to multiple classes of analgesics: N/A    Smoking Abstinence (404)  Patient is current smoker (cigarette, pipe, e-cig, marijuanna): No  Elective Surgery:   Abstinence instructions provided prior to day of surgery:   Patient abstained from smoking on day of surgery:     Pre-Op Beta-Blocker in Isolated CABG (44)  Isolated CABG AND patient age >= 18: No  Beta-blocker admin within 24 hours of surgical incision:   Exception:of medical reason(s) for not administering beta blocker within 24 hours prior to surgical incision (e.g., not  indicated,other medical reason):     PACU assessment of acute postoperative pain prior to Anesthesia Care End- Applies to Patients Age = 18- (ABG7)  Initial PACU pain score is which of the following: < 7/10  Patient unable to report pain score: N/A    Post-anesthetic transfer of care checklist/protocol to PACU/ICU- (426 and 427)  Upon conclusion of case, patient transferred to which of the following locations: PACU/Non-ICU  Use of transfer checklist/protocol: Yes  Exclusion: Service Performed in Patient Hospital Room (and thus did not require transfer): N/A  Unplanned admission to ICU related to anesthesia service up through end of PACU care- (MD51)  Unplanned admission to ICU (not initially anticipated at anesthesia start time): No

## 2020-07-20 NOTE — ANESTHESIA TIME REPORT
Anesthesia Start and Stop Event Times     Date Time Event    7/20/2020 0846 Ready for Procedure     0909 Anesthesia Start     1034 Anesthesia Stop        Responsible Staff  07/20/20    Name Role Begin End    Milton Morrison M.D. Anesth 0909 1034        Preop Diagnosis (Free Text):  Pre-op Diagnosis     CERVICAL STENOSIS AND DISC DISORDER WITH RADICULOPATHY        Preop Diagnosis (Codes):    Post op Diagnosis  Cervical stenosis of spine      Premium Reason  Non-Premium    Comments:

## 2020-07-20 NOTE — OR NURSING
Assume care for pt in pre-op. Patient allergies and NPO status verified. Belongings secured. Patient verbalizes understanding of pain scale, expected course of stay and plan of care. Surgical site verified with patient. IV access established. FBS = 125. Call light within reach. No further needs at this time. Hourly rounding in place.

## 2020-07-20 NOTE — OR NURSING
Pt on 2 L NC at this time.  No c/o nausea, tolerating PO fluids/juice and medication.  Anterior neck surgical dressing intact, scant drainage on left of dressing.  ANDRE compressed, patent and draining small amount of serosanguineous drainage.  No brace ordered at this time.  BUE strength 5, BLE strength 5.  VSS, afebrile, GIRARD, A/O x4.    Glasses in place. One clear pt belonging bag on [facility].    Oxygen tank 100% full.  Pt transported with surgical mask in place.

## 2020-07-21 VITALS
TEMPERATURE: 97.9 F | HEART RATE: 65 BPM | SYSTOLIC BLOOD PRESSURE: 116 MMHG | HEIGHT: 62 IN | BODY MASS INDEX: 25.23 KG/M2 | DIASTOLIC BLOOD PRESSURE: 47 MMHG | OXYGEN SATURATION: 96 % | RESPIRATION RATE: 16 BRPM | WEIGHT: 137.13 LBS

## 2020-07-21 LAB
ANION GAP SERPL CALC-SCNC: 10 MMOL/L (ref 7–16)
BUN SERPL-MCNC: 26 MG/DL (ref 8–22)
CALCIUM SERPL-MCNC: 9.5 MG/DL (ref 8.5–10.5)
CHLORIDE SERPL-SCNC: 96 MMOL/L (ref 96–112)
CO2 SERPL-SCNC: 24 MMOL/L (ref 20–33)
CREAT SERPL-MCNC: 0.81 MG/DL (ref 0.5–1.4)
ERYTHROCYTE [DISTWIDTH] IN BLOOD BY AUTOMATED COUNT: 53.3 FL (ref 35.9–50)
GLUCOSE BLD-MCNC: 145 MG/DL (ref 65–99)
GLUCOSE SERPL-MCNC: 170 MG/DL (ref 65–99)
HCT VFR BLD AUTO: 40.5 % (ref 37–47)
HGB BLD-MCNC: 13.1 G/DL (ref 12–16)
MCH RBC QN AUTO: 31.3 PG (ref 27–33)
MCHC RBC AUTO-ENTMCNC: 32.3 G/DL (ref 33.6–35)
MCV RBC AUTO: 96.9 FL (ref 81.4–97.8)
PLATELET # BLD AUTO: 255 K/UL (ref 164–446)
PMV BLD AUTO: 8.7 FL (ref 9–12.9)
POTASSIUM SERPL-SCNC: 4.6 MMOL/L (ref 3.6–5.5)
RBC # BLD AUTO: 4.18 M/UL (ref 4.2–5.4)
SODIUM SERPL-SCNC: 130 MMOL/L (ref 135–145)
WBC # BLD AUTO: 14.2 K/UL (ref 4.8–10.8)

## 2020-07-21 PROCEDURE — A9270 NON-COVERED ITEM OR SERVICE: HCPCS | Performed by: PHYSICIAN ASSISTANT

## 2020-07-21 PROCEDURE — 700102 HCHG RX REV CODE 250 W/ 637 OVERRIDE(OP): Performed by: PHYSICIAN ASSISTANT

## 2020-07-21 PROCEDURE — 80048 BASIC METABOLIC PNL TOTAL CA: CPT

## 2020-07-21 PROCEDURE — 36415 COLL VENOUS BLD VENIPUNCTURE: CPT

## 2020-07-21 PROCEDURE — 85027 COMPLETE CBC AUTOMATED: CPT

## 2020-07-21 PROCEDURE — 700111 HCHG RX REV CODE 636 W/ 250 OVERRIDE (IP): Performed by: PHYSICIAN ASSISTANT

## 2020-07-21 PROCEDURE — 82962 GLUCOSE BLOOD TEST: CPT

## 2020-07-21 PROCEDURE — 97165 OT EVAL LOW COMPLEX 30 MIN: CPT

## 2020-07-21 RX ORDER — CYCLOBENZAPRINE HCL 10 MG
10 TABLET ORAL 3 TIMES DAILY PRN
Qty: 60 TAB | Refills: 0 | Status: SHIPPED | OUTPATIENT
Start: 2020-07-21 | End: 2022-01-11

## 2020-07-21 RX ORDER — HYDROCODONE BITARTRATE AND ACETAMINOPHEN 5; 325 MG/1; MG/1
1 TABLET ORAL EVERY 6 HOURS PRN
Qty: 56 TAB | Refills: 0 | Status: SHIPPED | OUTPATIENT
Start: 2020-07-21 | End: 2020-08-04

## 2020-07-21 RX ADMIN — METFORMIN HYDROCHLORIDE 500 MG: 500 TABLET, EXTENDED RELEASE ORAL at 05:23

## 2020-07-21 RX ADMIN — ACETAMINOPHEN 650 MG: 325 TABLET, FILM COATED ORAL at 11:13

## 2020-07-21 RX ADMIN — SPIRONOLACTONE 25 MG: 25 TABLET ORAL at 05:23

## 2020-07-21 RX ADMIN — ACETAMINOPHEN 650 MG: 325 TABLET, FILM COATED ORAL at 00:19

## 2020-07-21 RX ADMIN — CEFAZOLIN SODIUM 2 G: 2 INJECTION, SOLUTION INTRAVENOUS at 00:19

## 2020-07-21 RX ADMIN — ANTACID TABLETS 500 MG: 500 TABLET, CHEWABLE ORAL at 05:23

## 2020-07-21 RX ADMIN — DEXAMETHASONE SODIUM PHOSPHATE 4 MG: 4 INJECTION, SOLUTION INTRA-ARTICULAR; INTRALESIONAL; INTRAMUSCULAR; INTRAVENOUS; SOFT TISSUE at 05:22

## 2020-07-21 RX ADMIN — DEXAMETHASONE SODIUM PHOSPHATE 4 MG: 4 INJECTION, SOLUTION INTRA-ARTICULAR; INTRALESIONAL; INTRAMUSCULAR; INTRAVENOUS; SOFT TISSUE at 00:19

## 2020-07-21 RX ADMIN — OMEPRAZOLE 20 MG: 20 CAPSULE, DELAYED RELEASE ORAL at 05:23

## 2020-07-21 RX ADMIN — ACETAMINOPHEN 650 MG: 325 TABLET, FILM COATED ORAL at 05:23

## 2020-07-21 RX ADMIN — ATORVASTATIN CALCIUM 40 MG: 40 TABLET, FILM COATED ORAL at 05:23

## 2020-07-21 ASSESSMENT — COGNITIVE AND FUNCTIONAL STATUS - GENERAL
SUGGESTED CMS G CODE MODIFIER DAILY ACTIVITY: CJ
HELP NEEDED FOR BATHING: A LITTLE
DRESSING REGULAR UPPER BODY CLOTHING: A LITTLE
DAILY ACTIVITIY SCORE: 22

## 2020-07-21 ASSESSMENT — ACTIVITIES OF DAILY LIVING (ADL): TOILETING: INDEPENDENT

## 2020-07-21 NOTE — PROGRESS NOTES
2 RN skin check complete.   Devices in place N/A.  Skin assessed under devices N/A.  Confirmed pressure ulcers found on N/A.  New potential pressure ulcers noted on NONE. Wound consult placed N/A.  The following interventions in place N/A

## 2020-07-21 NOTE — DISCHARGE INSTRUCTIONS
Discharge Instructions    Discharged to home by car with relative. Discharged via wheelchair, hospital escort: Yes.  Special equipment needed: Not Applicable    Be sure to schedule a follow-up appointment with your primary care doctor or any specialists as instructed.     Discharge Plan:   Diet Plan: Discussed  Activity Level: Discussed  Confirmed Follow up Appointment: Patient to Call and Schedule Appointment  Confirmed Symptoms Management: Discussed  Medication Reconciliation Updated: Yes    I understand that a diet low in cholesterol, fat, and sodium is recommended for good health. Unless I have been given specific instructions below for another diet, I accept this instruction as my diet prescription.   Other diet: Diabetic    Special Instructions:   Return to ER with chest pain, shortness of breath, difficulty swallowing, neck swelling,  leg or arm swelling.   Take pain medication as prescribed.   No driving while taking pain medication   May shower, no bath tub   Ice to incision frequently   Stool softeners prn   No bending/lifting/twisting greater than 10 pounds   Avoid repetitive overhead movements   Return on already scheduled post-operative appointment    · Is patient discharged on Warfarin / Coumadin?   No     Depression / Suicide Risk    As [facility], it is important to learn how to keep safe from harming yourself.    Recognize the warning signs:  · Abrupt changes in personality, positive or negative- including increase in energy   · Giving away possessions  · Change in eating patterns- significant weight changes-  positive or negative  · Change in sleeping patterns- unable to sleep or sleeping all the time   · Unwillingness or inability to communicate  · Depression  · Unusual sadness, discouragement and loneliness  · Talk of wanting to die  · Neglect of personal appearance   · Rebelliousness- reckless behavior  · Withdrawal from people/activities they  love  · Confusion- inability to concentrate     If you or a loved one observes any of these behaviors or has concerns about self-harm, here's what you can do:  · Talk about it- your feelings and reasons for harming yourself  · Remove any means that you might use to hurt yourself (examples: pills, rope, extension cords, firearm)  · Get professional help from the community (Mental Health, Substance Abuse, psychological counseling)  · Do not be alone:Call your Safe Contact- someone whom you trust who will be there for you.  · Call your local CRISIS HOTLINE 472-9287 or 019-078-2163  · Call your local Children's Mobile Crisis Response Team [place] (894) 383-1814 or www.[employer]  · Call the toll free National Suicide Prevention Hotlines   · National Suicide Prevention Lifeline 376-060-DIHD (8897)  · National Hope Line Network 800-SUICIDE (478-1056)      Cervical Fusion, Care After  This sheet gives you information about how to care for yourself after your procedure. Your health care provider may also give you more specific instructions. If you have problems or questions, contact your health care provider.  What can I expect after the procedure?  After the procedure, it is common to have:  · Incision area pain.  · Numbness.  · Weakness.  · Sore throat.  · Difficulty swallowing.  Follow these instructions at home:  Medicines  · Take over-the-counter and prescription medicines, including pain medicines, only as told by your health care provider.  · If you were prescribed an antibiotic medicine, take it as told by your health care provider. Do not stop taking the antibiotic even if you start to feel better.  If you have a brace:  · Wear the brace as told by your health care provider. Remove it only as told by your health care provider.  · Keep the brace clean.  Incision care    · Follow instructions from your health care provider about how to take care of your incision. Make sure you:  ? Wash your hands with  soap and water before you change your bandage (dressing). If soap and water are not available, use hand .  ? Change your dressing as told by your health care provider.  ? Leave stitches (sutures), skin glue, or adhesive strips in place. These skin closures may need to be in place for 2 weeks or longer. If adhesive strip edges start to loosen and curl up, you may trim the loose edges. Do not remove adhesive strips completely unless your health care provider tells you to do that.  · Keep your incision clean and dry. Do not take baths, swim, or use a hot tub until your health care provider approves.  · Check your incision area every day for signs of infection. Check for:  ? More redness, swelling, or pain.  ? More fluid or blood.  ? Warmth.  ? Pus or a bad smell.  Activity    · Rest and protect your back as much as possible.  · Do not lift anything that is heavier than 10 lb (4.5 kg) or the limit that you are told by your health care provider.  · Do not twist or bend at the waist until your health care provider approves.  · Avoid:  ? Pushing and pulling motions.  ? Lifting anything over your head.  ? Sitting or lying down in the same position for long periods of time.  · Do not exercise until your health care provider approves. Once your health care provider has approved exercise, ask him or her what kinds of exercises you can do to make your back stronger (physical therapy).  · Do not drive until your health care provider approves.  ? Do not drive for 24 hours if you received a medicine to help you relax (sedative).  ? Do not drive or use heavy machinery while taking prescription pain medicine.  General instructions  · Have someone assist you to turn in bed frequently by moving your whole body without twisting your back (log rolling technique).  · Wear compression stockings as told by your health care provider. These stockings help to prevent blood clots and reduce swelling in your legs.  · Do not use any  products that contain nicotine or tobacco, such as cigarettes and e-cigarettes. These can delay bone healing. If you need help quitting, ask your health care provider.  · To prevent or treat constipation while you are taking prescription pain medicine, your health care provider may recommend that you:  ? Drink enough fluid to keep your urine clear or pale yellow.  ? Take over-the-counter or prescription medicines.  ? Eat foods that are high in fiber, such as fresh fruits and vegetables, whole grains, and beans.  ? Limit foods that are high in fat and processed sugars, such as fried and sweet foods.  · Keep all follow-up visits as told by your health care provider. This is important.  Contact a health care provider if:  · You have pain that gets worse or does not get better with medicine.  · You have more redness, swelling, or pain around your incision.  · You have more fluid or blood coming from your incision.  · Your incision feels warm to the touch.  · You have pus or a bad smell coming from your incision.  · You have a fever.  · You have weakness or numbness in your legs that is new or getting worse.  · You have swelling in your calf or leg.  · The edges of your incision break open.  · You vomit or feel nauseous.  · You have trouble controlling urination or bowel movements.  Get help right away if:  · You develop shortness of breath or chest pain.  · You have trouble swallowing.  · You have trouble breathing.  · You develop a cough.  This information is not intended to replace advice given to you by your health care provider. Make sure you discuss any questions you have with your health care provider.  Document Released: 08/01/2005 Document Revised: 11/30/2018 Document Reviewed: 06/28/2017  Elsevier Patient Education © 2020 [employer]

## 2020-07-21 NOTE — PROGRESS NOTES
Went over discharge instructions w/ pt, when to call the doctor, f/u appointments, medications, spinal precautions. Prescriptions and copy of discharge paperwork given to pt. Pt had no further questions, pt waiting for  to come pick her up.

## 2020-07-21 NOTE — THERAPY
Occupational Therapy   Initial Evaluation     Patient Name: Jewell Diaz  Age:  80 y.o., Sex:  female  Medical Record #: [id number]  Today's Date: 7/21/2020     Precautions  Precautions: Fall Risk  Comments: no brace and spinal precautions for comfort only    Assessment  Patient is 80 y.o. female s/p C4-C6 anterior fusion. PMHx of HTN. Completed ADLs/txfs with SPV and functional ambulation w/o AD. Edu on spinal precautions for comfort and <10lb restriction especially for IADLs; receptive to education. Reports  able to assist PRN 24/7. Patient will not be actively followed for occupational therapy services at this time, however may be seen if requested by physician for 1 more visit within 30 days to address any discharge or equipment needs.     Plan    Recommend Occupational Therapy for Evaluation only    Discharge recommendations:  Currently recommend no further post acute OT after d/c home with family to assist.     Objective     07/21/20 1025   Prior Living Situation   Prior Services Home-Independent   Housing / Facility 1 Story House   Steps Into Home 1   Bathroom Set up Walk In Shower   Equipment Owned None   Lives with - Patient's Self Care Capacity Spouse   Comments Reports spouse available to assist PRN 24/7   Prior Level of ADL Function   Self Feeding Independent   Grooming / Hygiene Independent   Bathing Independent   Dressing Independent   Toileting Independent   Prior Level of IADL Function   Medication Management Independent   Laundry Independent   Kitchen Mobility Independent   Finances Independent   Home Management Independent   Shopping Independent   Prior Level Of Mobility Independent Without Device in Community;Independent Without Device in Home   Driving / Transportation Driving Independent   History of Falls   History of Falls No   Pain 0 - 10 Group   Therapist Pain Assessment Post Activity Pain Same as Prior to Activity;During Activity;Nurse Notified;0   Cognition    Cognition /  Consciousness WDL   Level of Consciousness Alert   Comments pleasant and cooperative   Passive ROM Upper Body   Passive ROM Upper Body WDL   Active ROM Upper Body   Active ROM Upper Body  WDL   Dominant Hand Right   Strength Upper Body   Upper Body Strength  WDL   Sensation Upper Body   Comments reported no change in sensation   Balance Assessment   Sitting Balance (Static) Good   Sitting Balance (Dynamic) Good   Standing Balance (Static) Fair +   Standing Balance (Dynamic) Fair   Weight Shift Sitting Good   Weight Shift Standing Fair   Comments w/o AD per request; no LOBs   Bed Mobility    Supine to Sit Supervised   Sit to Supine Supervised   Scooting Supervised   Rolling Supervised   Comments HOB flat and use of rail   ADL Assessment   Grooming   (declined )   Lower Body Dressing Supervision   Toileting Supervision   Functional Mobility   Sit to Stand Supervised   Bed, Chair, Wheelchair Transfer Supervised   Toilet Transfers Supervised   Transfer Method Stand Step   Mobility w/o AD: bed>toilet>chair   Visual Perception   Comments glasses   Activity Tolerance   Sitting in Chair 8+ min left in chair   Sitting Edge of Bed 6 min   Standing 4 min   Comments no c/o fatigue   Anticipated Discharge Equipment   DC Equipment Tub / Shower Seat

## 2020-07-21 NOTE — PROGRESS NOTES
Neurosurgery Progress Note    Subjective:  Postop day 1 C4-6 ACDF  Reports minimal neck pain  Denies radicular arm pain  Has mobilized to bathroom  No significant dysphasia or dysphonia  Drain 20 cc over 8 hours    Exam:  Motor 5/5 bilateral upper extremities  Anterior cervical incision soft without evidence of hematoma  Dysphonia      BP  Min: 105/56  Max: 163/80  Pulse  Av.2  Min: 65  Max: 93  Resp  Avg: 15.5  Min: 12  Max: 20  Temp  Av.5 °C (97.7 °F)  Min: 36 °C (96.8 °F)  Max: 37 °C (98.6 °F)  SpO2  Av.7 %  Min: 93 %  Max: 98 %    No data recorded    Recent Labs     20  0745   WBC 14.2*   RBC 4.18*   HEMOGLOBIN 13.1   HEMATOCRIT 40.5   MCV 96.9   MCH 31.3   MCHC 32.3*   RDW 53.3*   PLATELETCT 255   MPV 8.7*     Recent Labs     20  0745   SODIUM 130*   POTASSIUM 4.6   CHLORIDE 96   CO2 24   GLUCOSE 170*   BUN 26*   CREATININE 0.81   CALCIUM 9.5               Intake/Output       20 0700 - 20 0659 20 07 - 20 0659       Total  2248-4990 Total       Intake    P.O.  268  -- 268  240  -- 240    P.O. 268 -- 268 240 -- 240    I.V.  500  -- 500  --  -- --    Volume (mL) (lactated ringers infusion) 500 -- 500 -- -- --    Total Intake 768 -- 768 240 -- 240       Output    Urine  --  -- --  --  -- --    Number of Times Voided -- 4 x 4 x 1 x -- 1 x    Drains  --  60 60  --  -- --    Output (mL) ([facility]) -- 60 60 -- -- --    Blood  10  -- 10  --  -- --    Est. Blood Loss 10 -- 10 -- -- --    Total Output 10 60 70 -- -- --       Net I/O     758 -60 698 240 -- 240            Intake/Output Summary (Last 24 hours) at 2020 0946  Last data filed at 2020 0800  Gross per 24 hour   Intake 1008 ml   Output 70 ml   Net 938 ml            • losartan  50 mg DAILY   • metFORMIN ER  500 mg BID   • omeprazole  20 mg DAILY   • simvastatin  20 mg Nightly   • atorvastatin  40 mg DAILY   • spironolactone  25 mg BID    • Pharmacy Consult Request  1 Each PHARMACY TO DOSE   • acetaminophen  650 mg Q6HRS   • diphenhydrAMINE  25 mg Q6HRS PRN    Or   • diphenhydrAMINE  25 mg Q6HRS PRN   • ondansetron  4 mg Q4HRS PRN   • ondansetron  4 mg Q4HRS PRN   • methocarbamol  750 mg Q8HRS PRN   • labetalol  10 mg Q HOUR PRN   • hydrALAZINE  10 mg Q HOUR PRN   • benzocaine-menthol  1 Lozenge Q2HRS PRN   • calcium carbonate  500 mg BID   • docusate sodium  100 mg BID   • senna-docusate  1 Tab Nightly   • senna-docusate  1 Tab Q24HRS PRN   • polyethylene glycol/lytes  1 Packet BID PRN   • magnesium hydroxide  30 mL QDAY PRN   • bisacodyl  10 mg Q24HRS PRN   • fleet  1 Each Once PRN   • 0.9 % NaCl with KCl 20 mEq 1,000 mL   Continuous   • oxyCODONE immediate-release  2.5 mg Q3HRS PRN   • oxyCODONE immediate-release  5 mg Q3HRS PRN   • HYDROmorphone  0.25-0.5 mg Q3HRS PRN   • insulin regular  2-9 Units 4X/DAY ACHS    And   • glucose  16 g Q15 MIN PRN    And   • dextrose 50%  50 mL Q15 MIN PRN       Assessment and Plan:  POD #1  DC home  Return precautions discussed  Wound precautions discussed  Rx on chart  DC drain

## 2020-07-21 NOTE — PROGRESS NOTES
Pt aaox4. GIRARD 5/5. Denies N/T. Up w/ steady gait. Pt declines pain meds. +BS. Voiding w/o difficulty. Dressing intact. ANDRE in place, compressed. Reviewed poc with pt-verbalized understanding. Pt to d/c home later today. PT/Ot evals pending. Call light in reach.

## 2020-07-21 NOTE — CARE PLAN
Problem: Venous Thromboembolism (VTW)/Deep Vein Thrombosis (DVT) Prevention:  Goal: Patient will participate in Venous Thrombosis (VTE)/Deep Vein Thrombosis (DVT)Prevention Measures  Outcome: PROGRESSING AS EXPECTED   Patient wearing SCDs bilaterally in bed. Patient also ambulates from bed to bathroom as needed.     Problem: Pain Management  Goal: Pain level will decrease to patient's comfort goal  Outcome: PROGRESSING AS EXPECTED   Patient reports no pain currently. Educated patient to call staff if her pain begins to increase.

## 2020-07-21 NOTE — CARE PLAN
Problem: Safety  Goal: Will remain free from injury  Outcome: PROGRESSING AS EXPECTED  Note: Pt educated to use the call light when needing assistance, pt confirmed understanding      Problem: Venous Thromboembolism (VTW)/Deep Vein Thrombosis (DVT) Prevention:  Goal: Patient will participate in Venous Thrombosis (VTE)/Deep Vein Thrombosis (DVT)Prevention Measures  Outcome: PROGRESSING AS EXPECTED  Flowsheets (Taken 7/20/2020 1938)  Mechanical Prophylaxis: SCDs, Sequential Compression Device  SCDs, Sequential Compression Device: On  Note: Pt ambulating as stand by assist and calls appropriately

## 2020-07-22 NOTE — DISCHARGE SUMMARY
DATE OF ADMISSION:  07/20/2020    DATE OF DISCHARGE:  07/21/2020     ADMISSION DIAGNOSES:  1.  Cervical stenosis with radiculopathy, C4-C5, C5-C6.  2.  Cervicalgia.  3.  Cervical degenerative disk disease.    DISCHARGE DIAGNOSES:  1.  Cervical stenosis with radiculopathy, C4-C5, C5-C6.  2.  Cervicalgia.  3.  Cervical degenerative disk disease.  4.  Status post C4-C6 anterior cervical diskectomy and fusion.    HOSPITAL COURSE:  The patient is a very pleasant 80-year-old female with left   greater than right upper extremity radiculopathy, neck pain due to cervical   degenerative disk disease, severe foraminal stenosis at multiple levels.  Her   symptoms did wax and wane.  After much discussion and failure of nonoperative   measures as well as review of her MRI indicating multiple-level involvement,   Dr. Bailey did feel the C4-C5 and C5-C6 levels were most likely to address   her symptoms with the least morbidity possible.  After much discussion and   failure of nonoperative measures, we elected to proceed with surgical   intervention.  For details of the surgery, please see Dr. Bailey's operative   report.  Postoperatively, she has done well here on the floor.  She denies   significant neck pain.  She denies significant radicular arm pain.  Her pain   has been controlled with oral pain medications.  She denies significant   dysphagia or dysphonia.  She is mobilizing to the bathroom.  She has not   worked with PT or OT this a.m.  Her drain output has been acceptable.  Her   wound is clean, dry and intact without evidence of seroma or hematoma.    DISCHARGE INSTRUCTIONS:  Follow up with [facility] as previously arranged.    No bending, lifting, twisting.  Take pain medication as prescribed.  Ice the   incision frequently.  Stool softeners p.r.n.  Return to the ER with chest   pain, shortness of breath, fever, chills, leg or arm swelling, difficulty   swallowing or breathing.  May shower, no bathtub.    The above  represents a brief summary of this patient's hospital stay.  For   details, please see the medical chart.    DISCHARGE PRESCRIPTIONS:  Include Flexeril 10 mg 1 p.o. q. 8 hours p.r.n.   spasm, #60, no refills; and Norco 5/325 one p.o. q. 6 hours p.r.n. severe   pain, #56, 0 refills, 14-day supply.       ____________________________________     CHIQUI Shannon / FRANDY    DD:  07/21/2020 10:00:55  DT:  07/21/2020 23:31:19    D#:  [id number]  Job#:  [id number]    cc: Primary Care Physician

## 2020-07-22 NOTE — DISCHARGE SUMMARY
DATE OF ADMISSION:  07/20/2020    DATE OF DISCHARGE:  07/21/2020     ADMISSION DIAGNOSES:  1.  Cervical stenosis with radiculopathy, C4-C5, C5-C6.  2.  Cervicalgia.  3.  Cervical degenerative disk disease.    DISCHARGE DIAGNOSES:  1.  Cervical stenosis with radiculopathy, C4-C5, C5-C6.  2.  Cervicalgia.  3.  Cervical degenerative disk disease.  4.  Status post C4-C6 anterior cervical diskectomy and fusion.    HOSPITAL COURSE:  The patient is a very pleasant 80-year-old female with left   greater than right upper extremity radiculopathy, neck pain due to cervical   degenerative disk disease, severe foraminal stenosis at multiple levels.  Her   symptoms did wax and wane.  After much discussion and failure of nonoperative   measures as well as review of her MRI indicating multiple-level involvement,   Dr. Bailey did feel the C4-C5 and C5-C6 levels were most likely to address   her symptoms with the least morbidity possible.  After much discussion and   failure of nonoperative measures, we elected to proceed with surgical   intervention.  For details of the surgery, please see Dr. Bailey's operative   report.  Postoperatively, she has done well here on the floor.  She denies   significant neck pain.  She denies significant radicular arm pain.  Her pain   has been controlled with oral pain medications.  She denies significant   dysphagia or dysphonia.  She is mobilizing to the bathroom.  She has not   worked with PT or OT this a.m.  Her drain output has been acceptable.  Her   wound is clean, dry and intact without evidence of seroma or hematoma.    DISCHARGE INSTRUCTIONS:  Follow up with [facility] as previously arranged.    No bending, lifting, twisting.  Take pain medication as prescribed.  Ice the   incision frequently.  Stool softeners p.r.n.  Return to the ER with chest   pain, shortness of breath, fever, chills, leg or arm swelling, difficulty   swallowing or breathing.  May shower, no bathtub.    The above  represents a brief summary of this patient's hospital stay.  For   details, please see the medical chart.    DISCHARGE PRESCRIPTIONS:  Include Flexeril 10 mg 1 p.o. q. 8 hours p.r.n.   spasm, #60, no refills; and Norco 5/325 one p.o. q. 6 hours p.r.n. severe   pain, #56, 0 refills, 14-day supply.       ____________________________________     CHIQUI Shannon / FRANDY    DD:  07/21/2020 10:00:55  DT:  07/21/2020 23:31:19    D#:  [id number]  Job#:  [id number]    cc: Shaun Jasmine MD

## 2020-07-27 ENCOUNTER — PATIENT OUTREACH (OUTPATIENT)
Dept: HEALTH INFORMATION MANAGEMENT | Facility: OTHER | Age: 80
End: 2020-07-27

## 2020-07-27 NOTE — PROGRESS NOTES
A 80-year-old female was an elective admission to [facility] from 7/20/2020 to 7/21/2020 to treat Cervical disc disorder with radiculopathy, unspecified cervical region. The Patient Navigator visited the patient bedside. The patient was discharged home.   The patient was ordered to start/continue to take the following medications: Hydrocodone Bitartrate (Norco) and Cyclobenzaprine (Flexeril) 5mg tab. The patient successfully filled all medications.     The patient was ordered to follow-up with Outpatient Services and Specialist. The patient had the following appointment:  [facility], physician/geriatric medicine - PATIENT DECLINED APPOINTMENT. The patient has future appointments scheduled for:     1) 8/5/2020 @ 10:15 [facility] - SCHEDULED     2) 8/5/2020 @ 11:15 Ezra Bailey, neurosurgery - SCHEDULED     3) 9/21/2020 @ 10:30 Shaun Jasmine, physician/family practice - SCHEDULED      The Patient Navigator communicated with the patient throughout the case. The patient did decline [facility] services however, the patient has all of her follow up  appointments scheduled. The patient has no questions or concerns at this time. The care management team number was provided to the patient for future concerns.

## 2020-09-15 ENCOUNTER — HOSPITAL ENCOUNTER (OUTPATIENT)
Dept: LAB | Facility: MEDICAL CENTER | Age: 80
End: 2020-09-15
Attending: FAMILY MEDICINE
Payer: MEDICARE

## 2020-09-15 LAB
ALBUMIN SERPL BCP-MCNC: 4.1 G/DL (ref 3.2–4.9)
ALBUMIN/GLOB SERPL: 1.6 G/DL
ALP SERPL-CCNC: 82 U/L (ref 30–99)
ALT SERPL-CCNC: 20 U/L (ref 2–50)
ANION GAP SERPL CALC-SCNC: 13 MMOL/L (ref 7–16)
AST SERPL-CCNC: 26 U/L (ref 12–45)
BILIRUB SERPL-MCNC: 0.5 MG/DL (ref 0.1–1.5)
BUN SERPL-MCNC: 25 MG/DL (ref 8–22)
CALCIUM SERPL-MCNC: 9.9 MG/DL (ref 8.5–10.5)
CHLORIDE SERPL-SCNC: 101 MMOL/L (ref 96–112)
CHOLEST SERPL-MCNC: 136 MG/DL (ref 100–199)
CO2 SERPL-SCNC: 25 MMOL/L (ref 20–33)
CREAT SERPL-MCNC: 0.91 MG/DL (ref 0.5–1.4)
EST. AVERAGE GLUCOSE BLD GHB EST-MCNC: 166 MG/DL
FASTING STATUS PATIENT QL REPORTED: NORMAL
GLOBULIN SER CALC-MCNC: 2.5 G/DL (ref 1.9–3.5)
GLUCOSE SERPL-MCNC: 150 MG/DL (ref 65–99)
HBA1C MFR BLD: 7.4 % (ref 0–5.6)
HDLC SERPL-MCNC: 53 MG/DL
LDLC SERPL CALC-MCNC: 58 MG/DL
POTASSIUM SERPL-SCNC: 4.5 MMOL/L (ref 3.6–5.5)
PROT SERPL-MCNC: 6.6 G/DL (ref 6–8.2)
SODIUM SERPL-SCNC: 139 MMOL/L (ref 135–145)
TRIGL SERPL-MCNC: 125 MG/DL (ref 0–149)

## 2020-09-15 PROCEDURE — 80061 LIPID PANEL: CPT

## 2020-09-15 PROCEDURE — 80053 COMPREHEN METABOLIC PANEL: CPT

## 2020-09-15 PROCEDURE — 36415 COLL VENOUS BLD VENIPUNCTURE: CPT

## 2020-09-15 PROCEDURE — 83036 HEMOGLOBIN GLYCOSYLATED A1C: CPT

## 2020-11-09 ENCOUNTER — HOSPITAL ENCOUNTER (OUTPATIENT)
Dept: RADIOLOGY | Facility: MEDICAL CENTER | Age: 80
End: 2020-11-09
Attending: FAMILY MEDICINE
Payer: MEDICARE

## 2020-11-09 DIAGNOSIS — Z12.31 VISIT FOR SCREENING MAMMOGRAM: ICD-10-CM

## 2020-11-09 PROCEDURE — 77067 SCR MAMMO BI INCL CAD: CPT

## 2020-12-18 ENCOUNTER — TELEPHONE (OUTPATIENT)
Dept: SCHEDULING | Facility: IMAGING CENTER | Age: 80
End: 2020-12-18

## 2021-01-08 ENCOUNTER — OFFICE VISIT (OUTPATIENT)
Dept: MEDICAL GROUP | Facility: MEDICAL CENTER | Age: 81
End: 2021-01-08
Payer: MEDICARE

## 2021-01-08 VITALS
HEIGHT: 60 IN | TEMPERATURE: 97.3 F | SYSTOLIC BLOOD PRESSURE: 120 MMHG | WEIGHT: 136.69 LBS | DIASTOLIC BLOOD PRESSURE: 68 MMHG | HEART RATE: 80 BPM | OXYGEN SATURATION: 95 % | BODY MASS INDEX: 26.84 KG/M2 | RESPIRATION RATE: 20 BRPM

## 2021-01-08 DIAGNOSIS — E78.00 PURE HYPERCHOLESTEROLEMIA: ICD-10-CM

## 2021-01-08 DIAGNOSIS — I10 ESSENTIAL HYPERTENSION: ICD-10-CM

## 2021-01-08 DIAGNOSIS — R09.82 PND (POST-NASAL DRIP): ICD-10-CM

## 2021-01-08 DIAGNOSIS — Z76.89 ENCOUNTER TO ESTABLISH CARE: ICD-10-CM

## 2021-01-08 DIAGNOSIS — E11.9 TYPE 2 DIABETES MELLITUS WITHOUT COMPLICATION, WITHOUT LONG-TERM CURRENT USE OF INSULIN (HCC): ICD-10-CM

## 2021-01-08 DIAGNOSIS — M05.79 SEROPOSITIVE RHEUMATOID ARTHRITIS OF MULTIPLE SITES (HCC): ICD-10-CM

## 2021-01-08 PROCEDURE — 99214 OFFICE O/P EST MOD 30 MIN: CPT | Performed by: NURSE PRACTITIONER

## 2021-01-08 PROCEDURE — 8041 PR SCP AHA: Performed by: NURSE PRACTITIONER

## 2021-01-08 RX ORDER — ATORVASTATIN CALCIUM 40 MG/1
40 TABLET, FILM COATED ORAL NIGHTLY
COMMUNITY
End: 2022-03-17 | Stop reason: SDUPTHER

## 2021-01-08 RX ORDER — FLUTICASONE PROPIONATE 50 MCG
1 SPRAY, SUSPENSION (ML) NASAL DAILY
Qty: 16 G | Refills: 3 | Status: SHIPPED | OUTPATIENT
Start: 2021-01-08 | End: 2022-01-11

## 2021-01-08 RX ORDER — LOSARTAN POTASSIUM 100 MG/1
100 TABLET ORAL DAILY
COMMUNITY
End: 2022-07-12 | Stop reason: SDUPTHER

## 2021-01-08 RX ORDER — AMLODIPINE BESYLATE 5 MG/1
5 TABLET ORAL DAILY
COMMUNITY
End: 2022-01-11

## 2021-01-08 RX ORDER — PIOGLITAZONEHYDROCHLORIDE 15 MG/1
15 TABLET ORAL DAILY
COMMUNITY
End: 2022-07-12 | Stop reason: SDUPTHER

## 2021-01-08 RX ORDER — HYDROCHLOROTHIAZIDE 12.5 MG/1
12.5 CAPSULE, GELATIN COATED ORAL DAILY
COMMUNITY
End: 2021-07-28 | Stop reason: SDUPTHER

## 2021-01-08 ASSESSMENT — FIBROSIS 4 INDEX: FIB4 SCORE: 1.78

## 2021-01-08 NOTE — ASSESSMENT & PLAN NOTE
Chronic issue followed by rheumatology Dr. Acosta  Now managed with methotrexate which is working reasonably well. She is on folic acid

## 2021-01-08 NOTE — ASSESSMENT & PLAN NOTE
Persistent problem having improved in the past with use of antihistamine, she stopped taking Zyrtec due to concerns that may cause her long-term complications.

## 2021-01-08 NOTE — ASSESSMENT & PLAN NOTE
Chronic issue managed with Actos 15 mg daily, Metformin 500 mg twice daily.  She is tolerating medication without difficulty, watching her diet, she is cut back on rice and carbohydrates.  She is getting some light exercise.  Last A1c reasonable at 7.4.

## 2021-01-08 NOTE — PROGRESS NOTES
Subjective:     Chief Complaint   Patient presents with   • Establish Care     NP   • Dizziness     in morning, comes and goes     Jewell Diaz is a 80 y.o. female here with her  to establish care    Seropositive rheumatoid arthritis of multiple sites (HCC)  Chronic issue followed by rheumatology Dr. Acosta  Now managed with methotrexate which is working reasonably well. She is on folic acid    HLD (hyperlipidemia)  Managed with atorvastatin, tolerating well    Hypertension  Bp stable on current medication regimen of losartan 100 mg, hydrochlorothiazide, amlodipine, spironolactone  She has occ dizziness in the mornings but typically resolves quickly.  She has not found the dizziness to be associated to hypoglycemia.  She is not had any hypotensive blood pressure readings at home.  No chest pain, palpitations, presyncopal sensation    Type 2 diabetes mellitus without complication, without long-term current use of insulin (HCC)  Chronic issue managed with Actos 15 mg daily, Metformin 500 mg twice daily.  She is tolerating medication without difficulty, watching her diet, she is cut back on rice and carbohydrates.  She is getting some light exercise.  Last A1c reasonable at 7.4.    PND (post-nasal drip)  Persistent problem having improved in the past with use of antihistamine, she stopped taking Zyrtec due to concerns that may cause her long-term complications.     Annual Health Assessment Questions:     1.  Are you currently engaging in any exercise or physical activity? Yes     2.  How would you describe your mood or emotional well-being today? ok     3.  Have you had any falls in the last year? No    Current medicines (including changes today)  Current Outpatient Medications   Medication Sig Dispense Refill   • hydrochlorothiazide (MICROZIDE) 12.5 MG capsule Take 12.5 mg by mouth every day.     • losartan (COZAAR) 100 MG Tab Take 100 mg by mouth every day.     • pioglitazone (ACTOS) 15 MG Tab Take  15 mg by mouth every day.     • glucose blood strip 1 Strip by Other route every day. 100 Strip 3   • fluticasone (FLONASE) 50 MCG/ACT nasal spray Administer 1 Spray into affected nostril(S) every day. 16 g 3   • atorvastatin (LIPITOR) 40 MG Tab Take 40 mg by mouth every evening.     • amLODIPine (NORVASC) 5 MG Tab Take 5 mg by mouth every day.     • methotrexate 2.5 MG tablet Take 2.5 mg by mouth every 7 days.     • spironolactone (ALDACTONE) 25 MG Tab Take 25 mg by mouth 2 times a day.     • multivitamin (THERAGRAN) TABS Take 1 Tab by mouth every day.     • Metformin HCl 500 MG (MOD) TB24 Take  by mouth 2 Times a Day.     • omeprazole (PRILOSEC) 20 MG CPDR Take 20 mg by mouth every day.     • cyclobenzaprine (FLEXERIL) 10 MG Tab Take 1 Tab by mouth 3 times a day as needed. 60 Tab 0   • folic acid (FOLVITE) 1 MG Tab Take 1 mg by mouth every day.       No current facility-administered medications for this visit.      She  has a past medical history of CATARACT (2009), Diabetes (2007), DJD (degenerative joint disease) of thoracic spine, High cholesterol, Hypertension, Pain, RA (rheumatoid arthritis) ([facility]), and Urinary incontinence (07/14/2020).    ROS included above     Objective:     /68 (BP Location: Left arm, Patient Position: Sitting, BP Cuff Size: Adult)   Pulse 80   Temp 36.3 °C (97.3 °F) (Temporal)   Resp 20   Ht 1.524 m (5')   Wt 62 kg (136 lb 11 oz)   SpO2 95%  Body mass index is 26.69 kg/m².     Physical Exam:  General: Alert, oriented in no acute distress.  Eye contact is good, speech is normal, affect calm  Lungs: clear to auscultation bilaterally, normal effort, no wheeze/ rhonchi/ rales.  CV: regular rate and rhythm, S1, S2, no murmur  Abdomen: soft, nontender  Ext: no edema, color normal, vascularity normal, temperature normal    Assessment and Plan:   The following treatment plan was discussed   1. Seropositive rheumatoid arthritis of multiple sites (HCC)   chronic issue stable at this  time, followed by rheumatology   2. Pure hypercholesterolemia   managed with statin, update labs  Comp Metabolic Panel    Lipid Profile   3. Essential hypertension   controlled  Comp Metabolic Panel    MICROALBUMIN CREAT RATIO URINE   4. Type 2 diabetes mellitus without complication, without long-term current use of insulin (HCC)   last A1c reasonable at 7.4.  We will continue with current medication, reevaluate labs  HEMOGLOBIN A1C    glucose blood strip   5. PND (post-nasal drip)   encouraged consistent use of antihistamine in addition to Flonase.  Follow-up if not improving.  fluticasone (FLONASE) 50 MCG/ACT nasal spray   6. Encounter to establish care         Followup: 6 months and pending labs         Please note that this dictation was created using voice recognition software. I have worked with consultants from the vendor as well as technical experts from [employer] to optimize the interface. I have made every reasonable attempt to correct obvious errors, but I expect that there are errors of grammar and possibly content that I did not discover before finalizing the note.     AHA form completed

## 2021-01-08 NOTE — ASSESSMENT & PLAN NOTE
Bp stable on current medication regimen of losartan 100 mg, hydrochlorothiazide, spironolactone and hydrochlorothiazide  She has occ dizziness in the mornings but typically resolves quickly.

## 2021-01-08 NOTE — PROGRESS NOTES
Annual Health Assessment Questions:    1.  Are you currently engaging in any exercise or physical activity? Yes    2.  How would you describe your mood or emotional well-being today? ok    3.  Have you had any falls in the last year? No    4.  Have you noticed any problems with your balance or had difficulty walking? Yes    5.  In the last six months have you experienced any leakage of urine? No    6. DPA/Advanced Directive:

## 2021-01-11 DIAGNOSIS — Z23 NEED FOR VACCINATION: ICD-10-CM

## 2021-01-18 ENCOUNTER — HOSPITAL ENCOUNTER (OUTPATIENT)
Dept: LAB | Facility: MEDICAL CENTER | Age: 81
End: 2021-01-18
Attending: NURSE PRACTITIONER
Payer: MEDICARE

## 2021-01-18 ENCOUNTER — HOSPITAL ENCOUNTER (OUTPATIENT)
Dept: LAB | Facility: MEDICAL CENTER | Age: 81
End: 2021-01-18
Attending: INTERNAL MEDICINE
Payer: MEDICARE

## 2021-01-18 DIAGNOSIS — I10 ESSENTIAL HYPERTENSION: ICD-10-CM

## 2021-01-18 DIAGNOSIS — E11.9 TYPE 2 DIABETES MELLITUS WITHOUT COMPLICATION, WITHOUT LONG-TERM CURRENT USE OF INSULIN (HCC): ICD-10-CM

## 2021-01-18 DIAGNOSIS — E78.00 PURE HYPERCHOLESTEROLEMIA: ICD-10-CM

## 2021-01-18 LAB
ALBUMIN SERPL BCP-MCNC: 4 G/DL (ref 3.2–4.9)
ALBUMIN SERPL BCP-MCNC: 4 G/DL (ref 3.2–4.9)
ALBUMIN/GLOB SERPL: 1.3 G/DL
ALP SERPL-CCNC: 73 U/L (ref 30–99)
ALT SERPL-CCNC: 19 U/L (ref 2–50)
ANION GAP SERPL CALC-SCNC: 11 MMOL/L (ref 7–16)
AST SERPL-CCNC: 21 U/L (ref 12–45)
BASOPHILS # BLD AUTO: 0.5 % (ref 0–1.8)
BASOPHILS # BLD: 0.04 K/UL (ref 0–0.12)
BILIRUB SERPL-MCNC: 0.7 MG/DL (ref 0.1–1.5)
BUN SERPL-MCNC: 17 MG/DL (ref 8–22)
CALCIUM SERPL-MCNC: 9.8 MG/DL (ref 8.4–10.2)
CHLORIDE SERPL-SCNC: 103 MMOL/L (ref 96–112)
CHOLEST SERPL-MCNC: 141 MG/DL (ref 100–199)
CO2 SERPL-SCNC: 26 MMOL/L (ref 20–33)
CREAT SERPL-MCNC: 0.83 MG/DL (ref 0.5–1.4)
CREAT SERPL-MCNC: 0.84 MG/DL (ref 0.5–1.4)
CREAT UR-MCNC: 84.35 MG/DL
EOSINOPHIL # BLD AUTO: 0.13 K/UL (ref 0–0.51)
EOSINOPHIL NFR BLD: 1.7 % (ref 0–6.9)
ERYTHROCYTE [DISTWIDTH] IN BLOOD BY AUTOMATED COUNT: 51.9 FL (ref 35.9–50)
ERYTHROCYTE [SEDIMENTATION RATE] IN BLOOD BY WESTERGREN METHOD: 24 MM/HOUR (ref 0–30)
EST. AVERAGE GLUCOSE BLD GHB EST-MCNC: 157 MG/DL
FASTING STATUS PATIENT QL REPORTED: NORMAL
GLOBULIN SER CALC-MCNC: 3 G/DL (ref 1.9–3.5)
GLUCOSE SERPL-MCNC: 150 MG/DL (ref 65–99)
HAV IGM SERPL QL IA: NORMAL
HBA1C MFR BLD: 7.1 % (ref 0–5.6)
HBV CORE IGM SER QL: NORMAL
HBV SURFACE AG SER QL: NORMAL
HCT VFR BLD AUTO: 39.6 % (ref 37–47)
HCV AB SER QL: NORMAL
HDLC SERPL-MCNC: 57 MG/DL
HGB BLD-MCNC: 12.7 G/DL (ref 12–16)
IMM GRANULOCYTES # BLD AUTO: 0.03 K/UL (ref 0–0.11)
IMM GRANULOCYTES NFR BLD AUTO: 0.4 % (ref 0–0.9)
LDLC SERPL CALC-MCNC: 59 MG/DL
LYMPHOCYTES # BLD AUTO: 1.88 K/UL (ref 1–4.8)
LYMPHOCYTES NFR BLD: 25.2 % (ref 22–41)
MCH RBC QN AUTO: 33.5 PG (ref 27–33)
MCHC RBC AUTO-ENTMCNC: 32.1 G/DL (ref 33.6–35)
MCV RBC AUTO: 104.5 FL (ref 81.4–97.8)
MICROALBUMIN UR-MCNC: <1.2 MG/DL
MICROALBUMIN/CREAT UR: NORMAL MG/G (ref 0–30)
MONOCYTES # BLD AUTO: 0.57 K/UL (ref 0–0.85)
MONOCYTES NFR BLD AUTO: 7.6 % (ref 0–13.4)
NEUTROPHILS # BLD AUTO: 4.81 K/UL (ref 2–7.15)
NEUTROPHILS NFR BLD: 64.6 % (ref 44–72)
NRBC # BLD AUTO: 0 K/UL
NRBC BLD-RTO: 0 /100 WBC
PLATELET # BLD AUTO: 336 K/UL (ref 164–446)
PMV BLD AUTO: 8.5 FL (ref 9–12.9)
POTASSIUM SERPL-SCNC: 4.4 MMOL/L (ref 3.6–5.5)
PROT SERPL-MCNC: 7 G/DL (ref 6–8.2)
RBC # BLD AUTO: 3.79 M/UL (ref 4.2–5.4)
SODIUM SERPL-SCNC: 140 MMOL/L (ref 135–145)
TRIGL SERPL-MCNC: 125 MG/DL (ref 0–149)
WBC # BLD AUTO: 7.5 K/UL (ref 4.8–10.8)

## 2021-01-18 PROCEDURE — 80061 LIPID PANEL: CPT

## 2021-01-18 PROCEDURE — 80074 ACUTE HEPATITIS PANEL: CPT

## 2021-01-18 PROCEDURE — 85025 COMPLETE CBC W/AUTO DIFF WBC: CPT

## 2021-01-18 PROCEDURE — 82565 ASSAY OF CREATININE: CPT

## 2021-01-18 PROCEDURE — 82040 ASSAY OF SERUM ALBUMIN: CPT

## 2021-01-18 PROCEDURE — 84460 ALANINE AMINO (ALT) (SGPT): CPT

## 2021-01-18 PROCEDURE — 85652 RBC SED RATE AUTOMATED: CPT

## 2021-01-18 PROCEDURE — 84450 TRANSFERASE (AST) (SGOT): CPT

## 2021-01-18 PROCEDURE — 83036 HEMOGLOBIN GLYCOSYLATED A1C: CPT

## 2021-01-18 PROCEDURE — 82043 UR ALBUMIN QUANTITATIVE: CPT

## 2021-01-18 PROCEDURE — 36415 COLL VENOUS BLD VENIPUNCTURE: CPT

## 2021-01-18 PROCEDURE — 80053 COMPREHEN METABOLIC PANEL: CPT

## 2021-01-18 PROCEDURE — 82570 ASSAY OF URINE CREATININE: CPT

## 2021-01-19 LAB
ALT SERPL-CCNC: 19 U/L (ref 2–50)
AST SERPL-CCNC: 22 U/L (ref 12–45)

## 2021-01-20 ENCOUNTER — TELEPHONE (OUTPATIENT)
Dept: MEDICAL GROUP | Facility: MEDICAL CENTER | Age: 81
End: 2021-01-20

## 2021-01-20 NOTE — TELEPHONE ENCOUNTER
Unable to reach patient at this time. I left a detailed VM informing her on lab results and requesting call back to schedule appt in July.    Brenda Lomeli, Med Ass't

## 2021-06-09 ENCOUNTER — HOSPITAL ENCOUNTER (OUTPATIENT)
Dept: RADIOLOGY | Facility: MEDICAL CENTER | Age: 81
End: 2021-06-09
Attending: NURSE PRACTITIONER
Payer: MEDICARE

## 2021-06-09 DIAGNOSIS — K76.89 LIVER CYST: ICD-10-CM

## 2021-06-09 DIAGNOSIS — R16.0 HYPODENSE MASS OF LIVER: ICD-10-CM

## 2021-06-09 PROCEDURE — 76700 US EXAM ABDOM COMPLETE: CPT

## 2021-06-10 ENCOUNTER — OFFICE VISIT (OUTPATIENT)
Dept: MEDICAL GROUP | Facility: MEDICAL CENTER | Age: 81
End: 2021-06-10
Payer: MEDICARE

## 2021-06-10 VITALS
HEIGHT: 61 IN | SYSTOLIC BLOOD PRESSURE: 114 MMHG | WEIGHT: 137 LBS | OXYGEN SATURATION: 94 % | DIASTOLIC BLOOD PRESSURE: 70 MMHG | HEART RATE: 78 BPM | BODY MASS INDEX: 25.86 KG/M2 | TEMPERATURE: 98 F | RESPIRATION RATE: 20 BRPM

## 2021-06-10 DIAGNOSIS — I70.0 ATHEROSCLEROSIS OF AORTA (HCC): ICD-10-CM

## 2021-06-10 DIAGNOSIS — E78.5 DYSLIPIDEMIA ASSOCIATED WITH TYPE 2 DIABETES MELLITUS (HCC): ICD-10-CM

## 2021-06-10 DIAGNOSIS — Z23 NEED FOR VACCINATION: ICD-10-CM

## 2021-06-10 DIAGNOSIS — Z00.00 MEDICARE ANNUAL WELLNESS VISIT, SUBSEQUENT: ICD-10-CM

## 2021-06-10 DIAGNOSIS — E11.59 TYPE 2 DIABETES MELLITUS WITH OTHER CIRCULATORY COMPLICATION, WITHOUT LONG-TERM CURRENT USE OF INSULIN (HCC): ICD-10-CM

## 2021-06-10 DIAGNOSIS — M06.9 RHEUMATOID ARTHRITIS INVOLVING MULTIPLE JOINTS (HCC): ICD-10-CM

## 2021-06-10 DIAGNOSIS — E11.69 DYSLIPIDEMIA ASSOCIATED WITH TYPE 2 DIABETES MELLITUS (HCC): ICD-10-CM

## 2021-06-10 DIAGNOSIS — I10 ESSENTIAL HYPERTENSION: ICD-10-CM

## 2021-06-10 PROCEDURE — 90750 HZV VACC RECOMBINANT IM: CPT | Performed by: FAMILY MEDICINE

## 2021-06-10 PROCEDURE — G0439 PPPS, SUBSEQ VISIT: HCPCS | Performed by: NURSE PRACTITIONER

## 2021-06-10 PROCEDURE — 90471 IMMUNIZATION ADMIN: CPT | Performed by: FAMILY MEDICINE

## 2021-06-10 RX ORDER — ZOSTER VACCINE RECOMBINANT, ADJUVANTED 50 MCG/0.5
KIT INTRAMUSCULAR
COMMUNITY
Start: 2019-04-18 | End: 2022-07-12

## 2021-06-10 RX ORDER — AZELASTINE HCL 205.5 UG/1
SPRAY NASAL
COMMUNITY
Start: 2015-12-14 | End: 2022-07-12

## 2021-06-10 RX ORDER — DORZOLAMIDE HYDROCHLORIDE AND TIMOLOL MALEATE 20; 5 MG/ML; MG/ML
1 SOLUTION/ DROPS OPHTHALMIC 2 TIMES DAILY
COMMUNITY
Start: 2021-06-06

## 2021-06-10 RX ORDER — DIPHENHYDRAMINE HYDROCHLORIDE 25 MG/1
CAPSULE, LIQUID FILLED ORAL
COMMUNITY
Start: 2015-05-20 | End: 2023-01-18

## 2021-06-10 ASSESSMENT — ACTIVITIES OF DAILY LIVING (ADL): BATHING_REQUIRES_ASSISTANCE: 0

## 2021-06-10 ASSESSMENT — FIBROSIS 4 INDEX: FIB4 SCORE: 1.17

## 2021-06-10 ASSESSMENT — PATIENT HEALTH QUESTIONNAIRE - PHQ9: CLINICAL INTERPRETATION OF PHQ2 SCORE: 0

## 2021-06-10 ASSESSMENT — ENCOUNTER SYMPTOMS: GENERAL WELL-BEING: GOOD

## 2021-06-10 NOTE — ASSESSMENT & PLAN NOTE
Continues to be followed by rheumatology, on methotrexate and folic acid.  Still having difficulty with back pain, history of lumbar spinal surgery

## 2021-06-10 NOTE — PROGRESS NOTES
CC:  Wellness exam and follow up medical problems.    HPI:  Jewell is a 80-year-old established female patient here for HRA, accompanied by her   Rheumatoid arthritis involving multiple joints (HCC)  Continues to be followed by rheumatology, on methotrexate and folic acid.  Still having difficulty with back pain, history of lumbar spinal surgery    Type 2 diabetes mellitus with circulatory disorder, without long-term current use of insulin (HCC)  Stable with recent A1c of 7.1.  Consistent with metformin and Actos  No polyuria, polydipsia    Atherosclerosis of aorta (HCC)  Chronic issue noted on imaging, on statin and doing well with this    Hypertension  Blood pressure reasonable on current medication.  No chest pain, dizziness, palpitation    Depression Screening    Little interest or pleasure in doing things?  0 - not at all  Feeling down, depressed , or hopeless? 0 - not at all  Patient Health Questionnaire Score: 0     If depressive symptoms identified deferred to follow up visit unless specifically addressed in assessment and plan.    Interpretation of PHQ-9 Total Score   Score Severity   1-4 No Depression   5-9 Mild Depression   10-14 Moderate Depression   15-19 Moderately Severe Depression   20-27 Severe Depression    Screening for Cognitive Impairment    Three Minute Recall (captain, garden, picture) 3/3    Matt clock face with all 12 numbers and set the hands to show 5 past 8.  Yes    Cognitive concerns identified deferred for follow up unless specifically addressed in assessment and plan.    Fall Risk Assessment    Has the patient had two or more falls in the last year or any fall with injury in the last year?  No    Safety Assessment    Throw rugs on floor.  Yes  Handrails on all stairs.  Yes  Good lighting in all hallways.  Yes  Difficulty hearing.  No  Patient counseled about all safety risks that were identified.    Functional Assessment ADLs    Are there any barriers preventing you from cooking  for yourself or meeting nutritional needs?  No.    Are there any barriers preventing you from driving safely or obtaining transportation?  Yes.    Are there any barriers preventing you from using a telephone or calling for help?  No.    Are there any barriers preventing you from shopping?  No.    Are there any barriers preventing you from taking care of your own finances?  No.    Are there any barriers preventing you from managing your medications?  No.    Are there any barriers preventing you from showering, bathing or dressing yourself?  No.    Are you currently engaging in any exercise or physical activity?  Yes.     What is your perception of your health?  Good.      Health Maintenance Summary                DIABETES MONOFILAMENT / LE EXAM Overdue 1940     A1C SCREENING Next Due 7/18/2021      Done 4/24/2019 Ext Proc: HEMOGLOBIN A1C     Patient has more history with this topic...    RETINAL SCREENING Next Due 7/25/2021      Done 3/25/2021 REFERRAL FOR RETINAL SCREENING EXAM    IMM ZOSTER VACCINES Next Due 8/5/2021      Done 6/10/2021 Imm Admin: Zoster Vaccine Recombinant (RZV) (SHINGRIX)     Patient has more history with this topic...    MAMMOGRAM Next Due 11/9/2021      Done 11/9/2020 MA-SCREENING MAMMO BILAT W/TOMOSYNTHESIS W/CAD     Patient has more history with this topic...    FASTING LIPID PROFILE Next Due 1/18/2022      Done 1/18/2021 LIPID PROFILE     Patient has more history with this topic...    URINE ACR / MICROALBUMIN Next Due 1/18/2022      Done 1/18/2021 MICROALBUMIN CREAT RATIO URINE     Patient has more history with this topic...    SERUM CREATININE Next Due 1/18/2022      Done 1/18/2021 COMP METABOLIC PANEL     Patient has more history with this topic...    PAP SMEAR Next Due 1/8/2024      Done 1/8/2021     BONE DENSITY Next Due 7/2/2025      Done 7/2/2020 DS-BONE DENSITY STUDY (DEXA)     Patient has more history with this topic...    IMM DTaP/Tdap/Td Vaccine Next Due 4/18/2029      Done  4/18/2019 Imm Admin: Tdap Vaccine     Patient has more history with this topic...    COLONOSCOPY Next Due 1/8/2031      Done 1/8/2021           Patient Care Team:  HELEN Paris as PCP - General (Family Medicine)    See chart problem list or referrals section for names of specialist.    Patient Active Problem List    Diagnosis Date Noted   • Atherosclerosis of aorta ([facility]) 06/10/2021   • PND (post-nasal drip) 01/08/2021   • Rheumatoid arthritis involving multiple joints ([facility]) 02/24/2014   • Dizziness 01/16/2014   • Carotid bruit 01/16/2014   • Biliary calculus 08/20/2012   • Hypokalemia 08/20/2012   • Leukocytosis 08/20/2012   • HLD (hyperlipidemia) 08/01/2012   • Lumbar radiculopathy 05/11/2010   • Type 2 diabetes mellitus with circulatory disorder, without long-term current use of insulin ([facility]) 05/11/2010   • Hypertension 05/11/2010       Current Outpatient Medications on File Prior to Visit   Medication Sig Dispense Refill   • Azelastine HCl 0.15 % Solution AZELASTINE HCL 0.15 % SOLN     • Blood Glucose Monitoring Suppl (BLOOD GLUCOSE MONITOR SYSTEM) w/Device Kit BLOOD GLUCOSE MONITOR SYSTEM w/Device KIT     • dorzolamide-timolol (COSOPT) 22.3-6.8 MG/ML Solution      • hydrochlorothiazide (MICROZIDE) 12.5 MG capsule Take 12.5 mg by mouth every day.     • losartan (COZAAR) 100 MG Tab Take 100 mg by mouth every day.     • pioglitazone (ACTOS) 15 MG Tab Take 15 mg by mouth every day.     • glucose blood strip 1 Strip by Other route every day. 100 Strip 3   • fluticasone (FLONASE) 50 MCG/ACT nasal spray Administer 1 Spray into affected nostril(S) every day. 16 g 3   • atorvastatin (LIPITOR) 40 MG Tab Take 40 mg by mouth every evening.     • amLODIPine (NORVASC) 5 MG Tab Take 5 mg by mouth every day.     • cyclobenzaprine (FLEXERIL) 10 MG Tab Take 1 Tab by mouth 3 times a day as needed. 60 Tab 0   • methotrexate 2.5 MG tablet Take 2.5 mg by mouth every 7 days.     • spironolactone (ALDACTONE) 25 MG Tab Take  25 mg by mouth 2 times a day.     • folic acid (FOLVITE) 1 MG Tab Take 1 mg by mouth every day.     • multivitamin (THERAGRAN) TABS Take 1 Tab by mouth every day.     • Metformin HCl 500 MG (MOD) TB24 Take  by mouth 2 Times a Day.     • omeprazole (PRILOSEC) 20 MG CPDR Take 20 mg by mouth every day.     • Zoster Vac Recomb Adjuvanted (SHINGRIX) 50 MCG/0.5ML Recon Susp SHINGRIX 50 MCG/0.5ML SUSR (Patient not taking: Reported on 6/10/2021)       No current facility-administered medications on file prior to visit.       Nkda [no known drug allergy]    Social History     Socioeconomic History   • Marital status:      Spouse name: Not on file   • Number of children: Not on file   • Years of education: Not on file   • Highest education level: Not on file   Occupational History   • Not on file   Tobacco Use   • Smoking status: Never Smoker   • Smokeless tobacco: Never Used   Vaping Use   • Vaping Use: Never used   Substance and Sexual Activity   • Alcohol use: No     Alcohol/week: 0.0 oz   • Drug use: No   • Sexual activity: Not on file   Other Topics Concern   • Not on file   Social History Narrative   • Not on file     Social Determinants of Health     Financial Resource Strain:    • Difficulty of Paying Living Expenses:    Food Insecurity:    • Worried About Running Out of Food in the Last Year:    • Ran Out of Food in the Last Year:    Transportation Needs:    • Lack of Transportation (Medical):    • Lack of Transportation (Non-Medical):    Physical Activity:    • Days of Exercise per Week:    • Minutes of Exercise per Session:    Stress:    • Feeling of Stress :    Social Connections:    • Frequency of Communication with Friends and Family:    • Frequency of Social Gatherings with Friends and Family:    • Attends Religion Services:    • Active Member of Clubs or Organizations:    • Attends Club or Organization Meetings:    • Marital Status:    Intimate Partner Violence:    • Fear of Current or Ex-Partner:    •  "Emotionally Abused:    • Physically Abused:    • Sexually Abused:        Family History   Problem Relation Age of Onset   • Cancer Sister         breast       Past Surgical History:   Procedure Laterality Date   • CERVICAL DISK AND FUSION ANTERIOR  7/20/2020    Procedure: DISCECTOMY, SPINE, CERVICAL, ANTERIOR APPROACH, WITH FUSION- C4-6;  Surgeon: Ezra Bailey M.D.;  Location: SURGERY [facility];  Service: Neurosurgery   • SHOSHANA BY LAPAROSCOPY  8/21/2012    Performed by CAMMIE HUGHES at SURGERY [facility]   • LUMBAR FUSION POSTERIOR  5/6/2010    Performed by CASSI RESTREPO at SURGERY [facility]   • LUMBAR DECOMPRESSION  5/6/2010    Performed by CASSI RESTREPO at SURGERY [facility]   • LUMBAR LAMINECTOMY DISKECTOMY  12/26/2009    Performed by CASSI RESTREPO at [facility]   • CATARACT EXT W/IOL  6/2009    bilateral   • VAGINAL HYSTERECTOMY TOTAL  1976    Hysterectomy,Total Vaginal       ROS:  Denies any Headache, Blurred Vision, Confusion Chest pain,  Shortness of breath,  Abdominal pain, Changes of bowel or bladder, Lower ext edema, Fevers, Nights sweats, Weight Changes, Focal weakness or numbness.  All other systems are negative.    PHYSICAL:    /70 (BP Location: Right arm, Patient Position: Sitting, BP Cuff Size: Adult)   Pulse 78   Temp 36.7 °C (98 °F) (Temporal)   Resp 20   Ht 1.549 m (5' 1\")   Wt 62.1 kg (137 lb)   SpO2 94%     Gen:         Alert and oriented, No apparent distress.  Lungs:     Clear to auscultation bilaterally  CV:          Regular rate and rhythm. No murmurs, rubs or gallops.  Ext:          No clubbing, cyanosis, edema.  Skin:        No concerning lesions or rashes.    Health Maintenance Due   Topic Date Due   • DIABETES MONOFILAMENT / LE EXAM  Never done         ASSESSMENT AND PLAN:  Screening test reviewed with patient today and updated within health-care maintenance record. Discussion today about general wellness and lifestyle habits.      1. " Medicare annual wellness visit, subsequent  Problem list and medications reviewed and updated, preventative health measures addressed    2. Type 2 diabetes mellitus with other circulatory complication, without long-term current use of insulin (HCC)  Stable, update labs  - HEMOGLOBIN A1C; Future  - Comp Metabolic Panel; Future    3. Atherosclerosis of aorta (HCC)  Continue statin    4. Dyslipidemia associated with type 2 diabetes mellitus (HCC)  Same as above  - Lipid Profile; Future    5. Rheumatoid arthritis involving multiple joints (HCC)  Generally stable, followed by rheumatology    6. Essential hypertension  Stable    7. Need for vaccination  I have placed the below orders and discussed them with an approved delegating provider. The MA is performing the below orders under the direction of Dr. Ramirez  - Richard Vaccine (Shingrix)

## 2021-06-24 ENCOUNTER — HOSPITAL ENCOUNTER (OUTPATIENT)
Dept: LAB | Facility: MEDICAL CENTER | Age: 81
End: 2021-06-24
Attending: NURSE PRACTITIONER
Payer: MEDICARE

## 2021-06-24 DIAGNOSIS — E11.59 TYPE 2 DIABETES MELLITUS WITH OTHER CIRCULATORY COMPLICATION, WITHOUT LONG-TERM CURRENT USE OF INSULIN (HCC): ICD-10-CM

## 2021-06-24 DIAGNOSIS — E11.69 DYSLIPIDEMIA ASSOCIATED WITH TYPE 2 DIABETES MELLITUS (HCC): ICD-10-CM

## 2021-06-24 DIAGNOSIS — E78.5 DYSLIPIDEMIA ASSOCIATED WITH TYPE 2 DIABETES MELLITUS (HCC): ICD-10-CM

## 2021-06-24 LAB
ALBUMIN SERPL BCP-MCNC: 4.1 G/DL (ref 3.2–4.9)
ALBUMIN/GLOB SERPL: 1.4 G/DL
ALP SERPL-CCNC: 75 U/L (ref 30–99)
ALT SERPL-CCNC: 18 U/L (ref 2–50)
ANION GAP SERPL CALC-SCNC: 11 MMOL/L (ref 7–16)
AST SERPL-CCNC: 27 U/L (ref 12–45)
BILIRUB SERPL-MCNC: 0.5 MG/DL (ref 0.1–1.5)
BUN SERPL-MCNC: 25 MG/DL (ref 8–22)
CALCIUM SERPL-MCNC: 9.5 MG/DL (ref 8.5–10.5)
CHLORIDE SERPL-SCNC: 104 MMOL/L (ref 96–112)
CHOLEST SERPL-MCNC: 146 MG/DL (ref 100–199)
CO2 SERPL-SCNC: 25 MMOL/L (ref 20–33)
CREAT SERPL-MCNC: 0.81 MG/DL (ref 0.5–1.4)
EST. AVERAGE GLUCOSE BLD GHB EST-MCNC: 146 MG/DL
FASTING STATUS PATIENT QL REPORTED: NORMAL
GLOBULIN SER CALC-MCNC: 2.9 G/DL (ref 1.9–3.5)
GLUCOSE SERPL-MCNC: 136 MG/DL (ref 65–99)
HBA1C MFR BLD: 6.7 % (ref 4–5.6)
HDLC SERPL-MCNC: 44 MG/DL
LDLC SERPL CALC-MCNC: 72 MG/DL
POTASSIUM SERPL-SCNC: 4.4 MMOL/L (ref 3.6–5.5)
PROT SERPL-MCNC: 7 G/DL (ref 6–8.2)
SODIUM SERPL-SCNC: 140 MMOL/L (ref 135–145)
TRIGL SERPL-MCNC: 148 MG/DL (ref 0–149)

## 2021-06-24 PROCEDURE — 80061 LIPID PANEL: CPT

## 2021-06-24 PROCEDURE — 83036 HEMOGLOBIN GLYCOSYLATED A1C: CPT

## 2021-06-24 PROCEDURE — 80053 COMPREHEN METABOLIC PANEL: CPT

## 2021-06-24 PROCEDURE — 36415 COLL VENOUS BLD VENIPUNCTURE: CPT

## 2021-06-30 ENCOUNTER — PATIENT OUTREACH (OUTPATIENT)
Dept: HEALTH INFORMATION MANAGEMENT | Facility: OTHER | Age: 81
End: 2021-06-30

## 2021-07-28 RX ORDER — HYDROCHLOROTHIAZIDE 12.5 MG/1
12.5 CAPSULE, GELATIN COATED ORAL DAILY
Qty: 30 CAPSULE | Refills: 5 | Status: SHIPPED | OUTPATIENT
Start: 2021-07-28 | End: 2022-07-12 | Stop reason: SDUPTHER

## 2021-09-09 ENCOUNTER — HOSPITAL ENCOUNTER (OUTPATIENT)
Dept: LAB | Facility: MEDICAL CENTER | Age: 81
End: 2021-09-09
Attending: INTERNAL MEDICINE
Payer: MEDICARE

## 2021-09-09 LAB
ALT SERPL-CCNC: 20 U/L (ref 2–50)
AST SERPL-CCNC: 26 U/L (ref 12–45)
BASOPHILS # BLD AUTO: 0.5 % (ref 0–1.8)
BASOPHILS # BLD: 0.03 K/UL (ref 0–0.12)
CREAT SERPL-MCNC: 0.77 MG/DL (ref 0.5–1.4)
EOSINOPHIL # BLD AUTO: 0.17 K/UL (ref 0–0.51)
EOSINOPHIL NFR BLD: 2.9 % (ref 0–6.9)
ERYTHROCYTE [DISTWIDTH] IN BLOOD BY AUTOMATED COUNT: 54.7 FL (ref 35.9–50)
ERYTHROCYTE [SEDIMENTATION RATE] IN BLOOD BY WESTERGREN METHOD: 25 MM/HOUR (ref 0–25)
HCT VFR BLD AUTO: 36.7 % (ref 37–47)
HGB BLD-MCNC: 11.7 G/DL (ref 12–16)
IMM GRANULOCYTES # BLD AUTO: 0.01 K/UL (ref 0–0.11)
IMM GRANULOCYTES NFR BLD AUTO: 0.2 % (ref 0–0.9)
LYMPHOCYTES # BLD AUTO: 1.63 K/UL (ref 1–4.8)
LYMPHOCYTES NFR BLD: 27.9 % (ref 22–41)
MCH RBC QN AUTO: 34.1 PG (ref 27–33)
MCHC RBC AUTO-ENTMCNC: 31.9 G/DL (ref 33.6–35)
MCV RBC AUTO: 107 FL (ref 81.4–97.8)
MONOCYTES # BLD AUTO: 0.75 K/UL (ref 0–0.85)
MONOCYTES NFR BLD AUTO: 12.8 % (ref 0–13.4)
NEUTROPHILS # BLD AUTO: 3.25 K/UL (ref 2–7.15)
NEUTROPHILS NFR BLD: 55.7 % (ref 44–72)
NRBC # BLD AUTO: 0 K/UL
NRBC BLD-RTO: 0 /100 WBC
PLATELET # BLD AUTO: 264 K/UL (ref 164–446)
PMV BLD AUTO: 9.1 FL (ref 9–12.9)
RBC # BLD AUTO: 3.43 M/UL (ref 4.2–5.4)
WBC # BLD AUTO: 5.8 K/UL (ref 4.8–10.8)

## 2021-09-09 PROCEDURE — 84460 ALANINE AMINO (ALT) (SGPT): CPT

## 2021-09-09 PROCEDURE — 36415 COLL VENOUS BLD VENIPUNCTURE: CPT

## 2021-09-09 PROCEDURE — 85652 RBC SED RATE AUTOMATED: CPT

## 2021-09-09 PROCEDURE — 85025 COMPLETE CBC W/AUTO DIFF WBC: CPT

## 2021-09-09 PROCEDURE — 84450 TRANSFERASE (AST) (SGOT): CPT

## 2021-09-09 PROCEDURE — 82565 ASSAY OF CREATININE: CPT

## 2021-09-28 ENCOUNTER — HOSPITAL ENCOUNTER (OUTPATIENT)
Dept: LAB | Facility: MEDICAL CENTER | Age: 81
End: 2021-09-28
Attending: NEUROLOGICAL SURGERY
Payer: MEDICARE

## 2021-09-28 LAB
25(OH)D3 SERPL-MCNC: 42 NG/ML (ref 30–100)
ALBUMIN SERPL BCP-MCNC: 3.8 G/DL (ref 3.2–4.9)
ALBUMIN/GLOB SERPL: 1.3 G/DL
ALP SERPL-CCNC: 79 U/L (ref 30–99)
ALT SERPL-CCNC: 20 U/L (ref 2–50)
ANION GAP SERPL CALC-SCNC: 12 MMOL/L (ref 7–16)
AST SERPL-CCNC: 23 U/L (ref 12–45)
BASOPHILS # BLD AUTO: 0.7 % (ref 0–1.8)
BASOPHILS # BLD: 0.04 K/UL (ref 0–0.12)
BILIRUB SERPL-MCNC: 0.4 MG/DL (ref 0.1–1.5)
BUN SERPL-MCNC: 31 MG/DL (ref 8–22)
CA-I SERPL-SCNC: 1.18 MMOL/L (ref 1.1–1.3)
CALCIUM SERPL-MCNC: 9.7 MG/DL (ref 8.4–10.2)
CHLORIDE SERPL-SCNC: 107 MMOL/L (ref 96–112)
CO2 SERPL-SCNC: 20 MMOL/L (ref 20–33)
CREAT SERPL-MCNC: 0.91 MG/DL (ref 0.5–1.4)
EOSINOPHIL # BLD AUTO: 0.14 K/UL (ref 0–0.51)
EOSINOPHIL NFR BLD: 2.4 % (ref 0–6.9)
ERYTHROCYTE [DISTWIDTH] IN BLOOD BY AUTOMATED COUNT: 53.9 FL (ref 35.9–50)
ESTRADIOL SERPL-MCNC: <5 PG/ML
GLOBULIN SER CALC-MCNC: 2.9 G/DL (ref 1.9–3.5)
GLUCOSE SERPL-MCNC: 127 MG/DL (ref 65–99)
HCT VFR BLD AUTO: 37.6 % (ref 37–47)
HGB BLD-MCNC: 11.8 G/DL (ref 12–16)
IMM GRANULOCYTES # BLD AUTO: 0.02 K/UL (ref 0–0.11)
IMM GRANULOCYTES NFR BLD AUTO: 0.3 % (ref 0–0.9)
LYMPHOCYTES # BLD AUTO: 1.4 K/UL (ref 1–4.8)
LYMPHOCYTES NFR BLD: 24.3 % (ref 22–41)
MAGNESIUM SERPL-MCNC: 1.8 MG/DL (ref 1.5–2.5)
MCH RBC QN AUTO: 34.2 PG (ref 27–33)
MCHC RBC AUTO-ENTMCNC: 31.4 G/DL (ref 33.6–35)
MCV RBC AUTO: 109 FL (ref 81.4–97.8)
MONOCYTES # BLD AUTO: 0.59 K/UL (ref 0–0.85)
MONOCYTES NFR BLD AUTO: 10.2 % (ref 0–13.4)
NEUTROPHILS # BLD AUTO: 3.57 K/UL (ref 2–7.15)
NEUTROPHILS NFR BLD: 62.1 % (ref 44–72)
NRBC # BLD AUTO: 0 K/UL
NRBC BLD-RTO: 0 /100 WBC
PHOSPHATE SERPL-MCNC: 3.3 MG/DL (ref 2.5–4.5)
PLATELET # BLD AUTO: 217 K/UL (ref 164–446)
PMV BLD AUTO: 9.5 FL (ref 9–12.9)
POTASSIUM SERPL-SCNC: 4.9 MMOL/L (ref 3.6–5.5)
PROT SERPL-MCNC: 6.7 G/DL (ref 6–8.2)
PTH-INTACT SERPL-MCNC: 17.9 PG/ML (ref 14–72)
RBC # BLD AUTO: 3.45 M/UL (ref 4.2–5.4)
SODIUM SERPL-SCNC: 139 MMOL/L (ref 135–145)
TSH SERPL DL<=0.005 MIU/L-ACNC: 1.56 UIU/ML (ref 0.38–5.33)
WBC # BLD AUTO: 5.8 K/UL (ref 4.8–10.8)

## 2021-09-28 PROCEDURE — 84075 ASSAY ALKALINE PHOSPHATASE: CPT

## 2021-09-28 PROCEDURE — 82306 VITAMIN D 25 HYDROXY: CPT

## 2021-09-28 PROCEDURE — 80053 COMPREHEN METABOLIC PANEL: CPT

## 2021-09-28 PROCEDURE — 82670 ASSAY OF TOTAL ESTRADIOL: CPT

## 2021-09-28 PROCEDURE — 85025 COMPLETE CBC W/AUTO DIFF WBC: CPT

## 2021-09-28 PROCEDURE — 82330 ASSAY OF CALCIUM: CPT

## 2021-09-28 PROCEDURE — 36415 COLL VENOUS BLD VENIPUNCTURE: CPT

## 2021-09-28 PROCEDURE — 83970 ASSAY OF PARATHORMONE: CPT

## 2021-09-28 PROCEDURE — 83735 ASSAY OF MAGNESIUM: CPT

## 2021-09-28 PROCEDURE — 84443 ASSAY THYROID STIM HORMONE: CPT

## 2021-09-28 PROCEDURE — 84100 ASSAY OF PHOSPHORUS: CPT

## 2021-09-28 PROCEDURE — 84080 ASSAY ALKALINE PHOSPHATASES: CPT

## 2021-09-30 LAB
ALP BONE SERPL-CCNC: 25 U/L (ref 0–55)
ALP ISOS SERPL HS-CCNC: 0 U/L
ALP LIVER SERPL-CCNC: 57 U/L (ref 0–94)
ALP SERPL-CCNC: 82 U/L (ref 40–120)

## 2021-10-19 ENCOUNTER — APPOINTMENT (OUTPATIENT)
Dept: RADIOLOGY | Facility: MEDICAL CENTER | Age: 81
End: 2021-10-19
Attending: PHYSICIAN ASSISTANT
Payer: MEDICARE

## 2021-11-09 ENCOUNTER — OFFICE VISIT (OUTPATIENT)
Dept: MEDICAL GROUP | Facility: MEDICAL CENTER | Age: 81
End: 2021-11-09
Payer: MEDICARE

## 2021-11-09 VITALS
DIASTOLIC BLOOD PRESSURE: 68 MMHG | RESPIRATION RATE: 18 BRPM | WEIGHT: 138.45 LBS | HEART RATE: 87 BPM | SYSTOLIC BLOOD PRESSURE: 122 MMHG | BODY MASS INDEX: 26.14 KG/M2 | OXYGEN SATURATION: 94 % | TEMPERATURE: 97.9 F | HEIGHT: 61 IN

## 2021-11-09 DIAGNOSIS — Z13.29 THYROID DISORDER SCREEN: ICD-10-CM

## 2021-11-09 DIAGNOSIS — I10 PRIMARY HYPERTENSION: ICD-10-CM

## 2021-11-09 DIAGNOSIS — E11.59 TYPE 2 DIABETES MELLITUS WITH OTHER CIRCULATORY COMPLICATION, WITHOUT LONG-TERM CURRENT USE OF INSULIN (HCC): ICD-10-CM

## 2021-11-09 DIAGNOSIS — M54.16 LUMBAR RADICULOPATHY: ICD-10-CM

## 2021-11-09 DIAGNOSIS — Z13.21 ENCOUNTER FOR VITAMIN DEFICIENCY SCREENING: ICD-10-CM

## 2021-11-09 DIAGNOSIS — Z23 NEED FOR VACCINATION: ICD-10-CM

## 2021-11-09 DIAGNOSIS — D63.8 ANEMIA, CHRONIC DISEASE: ICD-10-CM

## 2021-11-09 DIAGNOSIS — E78.00 PURE HYPERCHOLESTEROLEMIA: ICD-10-CM

## 2021-11-09 DIAGNOSIS — M06.9 RHEUMATOID ARTHRITIS INVOLVING MULTIPLE JOINTS (HCC): ICD-10-CM

## 2021-11-09 PROCEDURE — 90472 IMMUNIZATION ADMIN EACH ADD: CPT | Performed by: NURSE PRACTITIONER

## 2021-11-09 PROCEDURE — 90750 HZV VACC RECOMBINANT IM: CPT | Performed by: NURSE PRACTITIONER

## 2021-11-09 PROCEDURE — 99214 OFFICE O/P EST MOD 30 MIN: CPT | Mod: 25 | Performed by: NURSE PRACTITIONER

## 2021-11-09 PROCEDURE — 90662 IIV NO PRSV INCREASED AG IM: CPT | Performed by: NURSE PRACTITIONER

## 2021-11-09 PROCEDURE — G0008 ADMIN INFLUENZA VIRUS VAC: HCPCS | Performed by: NURSE PRACTITIONER

## 2021-11-09 ASSESSMENT — FIBROSIS 4 INDEX: FIB4 SCORE: 1.87

## 2021-11-09 NOTE — ASSESSMENT & PLAN NOTE
Continues to be followed by rheumatology, no interval change from last visit.  On methotrexate, folic acid supplement  She does complain of increasing fatigue over the last few months despite getting adequate rest.  She has had thyroid and vitamin D studies recently which were normal.  She does have chronic anemia, is currently on an iron supplement

## 2021-11-09 NOTE — PROGRESS NOTES
Subjective:     Chief Complaint   Patient presents with   • Follow-Up     6 mo    • Immunizations     shingles #2     Jewell Diaz is a 81 y.o. female here today to follow up on:    Lumbar radiculopathy  H/o laminectomy as well as cervical fusion. May be having repeat cervical surgery with Dr. Reddy     Type 2 diabetes mellitus with circulatory disorder, without long-term current use of insulin (HCC)  Last a1c controlled at 6.7   Managing with actos and metformin  Watching diet, low carb and low sugar.  No polyuria, polydipsia    Rheumatoid arthritis involving multiple joints (HCC)  Continues to be followed by rheumatology, no interval change from last visit.  On methotrexate, folic acid supplement  She does complain of increasing fatigue over the last few months despite getting adequate rest.  She has had thyroid and vitamin D studies recently which were normal.  She does have chronic anemia, is currently on an iron supplement    HLD (hyperlipidemia)  Stable on atorvastatin, tolerating medication without difficulty    Hypertension  Blood pressure continues to be stable on losartan, amlodipine, hctz and spironolactone  Consistent with medication.  No chest pain, palpitations, activity intolerance.  No swelling in lower extremities       Current medicines (including changes today)  Current Outpatient Medications   Medication Sig Dispense Refill   • hydrochlorothiazide (MICROZIDE) 12.5 MG capsule Take 1 capsule by mouth every day. 30 capsule 5   • Azelastine HCl 0.15 % Solution AZELASTINE HCL 0.15 % SOLN     • Blood Glucose Monitoring Suppl (BLOOD GLUCOSE MONITOR SYSTEM) w/Device Kit BLOOD GLUCOSE MONITOR SYSTEM w/Device KIT     • dorzolamide-timolol (COSOPT) 22.3-6.8 MG/ML Solution      • Zoster Vac Recomb Adjuvanted (SHINGRIX) 50 MCG/0.5ML Recon Susp SHINGRIX 50 MCG/0.5ML SUSR (Patient not taking: Reported on 6/10/2021)     • losartan (COZAAR) 100 MG Tab Take 100 mg by mouth every day.     •  "pioglitazone (ACTOS) 15 MG Tab Take 15 mg by mouth every day.     • glucose blood strip 1 Strip by Other route every day. 100 Strip 3   • fluticasone (FLONASE) 50 MCG/ACT nasal spray Administer 1 Spray into affected nostril(S) every day. 16 g 3   • atorvastatin (LIPITOR) 40 MG Tab Take 40 mg by mouth every evening.     • amLODIPine (NORVASC) 5 MG Tab Take 5 mg by mouth every day.     • cyclobenzaprine (FLEXERIL) 10 MG Tab Take 1 Tab by mouth 3 times a day as needed. 60 Tab 0   • methotrexate 2.5 MG tablet Take 2.5 mg by mouth every 7 days.     • spironolactone (ALDACTONE) 25 MG Tab Take 25 mg by mouth 2 times a day.     • folic acid (FOLVITE) 1 MG Tab Take 1 mg by mouth every day.     • multivitamin (THERAGRAN) TABS Take 1 Tab by mouth every day.     • Metformin HCl 500 MG (MOD) TB24 Take  by mouth 2 Times a Day.     • omeprazole (PRILOSEC) 20 MG CPDR Take 20 mg by mouth every day.       No current facility-administered medications for this visit.     She  has a past medical history of CATARACT (2009), Diabetes (2007), DJD (degenerative joint disease) of thoracic spine, High cholesterol, Hypertension, Pain, RA (rheumatoid arthritis) (HCC), and Urinary incontinence (07/14/2020).    ROS included above     Objective:     /68 (BP Location: Left arm, Patient Position: Sitting, BP Cuff Size: Adult)   Pulse 87   Temp 36.6 °C (97.9 °F) (Temporal)   Resp 18   Ht 1.549 m (5' 1\")   Wt 62.8 kg (138 lb 7.2 oz)   SpO2 94%  Body mass index is 26.16 kg/m².     Physical Exam:  General: Alert, oriented in no acute distress.  Eye contact is good, speech is normal, affect calm  Lungs: clear to auscultation bilaterally, normal effort, no wheeze/ rhonchi/ rales.  CV: regular rate and rhythm, S1, S2, murmur present  Abdomen: soft, nontender  Ext: no edema, color normal, vascularity normal, temperature normal    Assessment and Plan:   The following treatment plan was discussed   1. Lumbar radiculopathy   chronic issue followed " by neurosurgery, multiple prior spinal surgeries and now possibly planning for another procedure.  Nicolás managed by Dr. Reddy    2. Rheumatoid arthritis involving multiple joints (HCC)   chronic issue followed by rheumatology, no interval change since last visit   3. Type 2 diabetes mellitus with other circulatory complication, without long-term current use of insulin (HCC)   stable on Actos and Metformin.  Will update labs.  Encouraged to continue healthy diet and exercise as tolerated  HEMOGLOBIN A1C    Comp Metabolic Panel   4. Primary hypertension   stable   5. Pure hypercholesterolemia   continue statin   6. Anemia, chronic disease   mild anemia, possibly related to her RA medications.  We will continue to monitor, continue iron supplement  CBC WITH DIFFERENTIAL    IRON/TOTAL IRON BIND    VITAMIN B12   7. Thyroid disorder screen  TSH WITH REFLEX TO FT4   8. Encounter for vitamin deficiency screening     9. Need for vaccination  I have placed the below orders and discussed them with an approved delegating provider. The MA is performing the below orders under the direction of Dr. Karla Mccray Vaccine    INFLUENZA VACCINE, HIGH DOSE (65+ ONLY)       Followup: pending labs         Please note that this dictation was created using voice recognition software. I have worked with consultants from the vendor as well as technical experts from [employer] to optimize the interface. I have made every reasonable attempt to correct obvious errors, but I expect that there are errors of grammar and possibly content that I did not discover before finalizing the note.

## 2021-11-09 NOTE — ASSESSMENT & PLAN NOTE
Blood pressure continues to be stable on losartan, amlodipine, hctz and spironolactone  Consistent with medication.  No chest pain, palpitations, activity intolerance.  No swelling in lower extremities

## 2021-11-09 NOTE — ASSESSMENT & PLAN NOTE
Last a1c controlled at 6.7   Managing with actos and metformin  Watching diet, low carb and low sugar.  No polyuria, polydipsia

## 2021-11-12 ENCOUNTER — HOSPITAL ENCOUNTER (OUTPATIENT)
Dept: RADIOLOGY | Facility: MEDICAL CENTER | Age: 81
End: 2021-11-12
Attending: PHYSICIAN ASSISTANT
Payer: MEDICARE

## 2021-11-12 DIAGNOSIS — M81.0 AGE-RELATED OSTEOPOROSIS WITHOUT CURRENT PATHOLOGICAL FRACTURE: ICD-10-CM

## 2021-11-12 PROCEDURE — 77080 DXA BONE DENSITY AXIAL: CPT

## 2021-11-16 ENCOUNTER — HOSPITAL ENCOUNTER (OUTPATIENT)
Dept: LAB | Facility: MEDICAL CENTER | Age: 81
End: 2021-11-16
Attending: NURSE PRACTITIONER
Payer: MEDICARE

## 2021-11-16 DIAGNOSIS — D63.8 ANEMIA, CHRONIC DISEASE: ICD-10-CM

## 2021-11-16 DIAGNOSIS — E11.59 TYPE 2 DIABETES MELLITUS WITH OTHER CIRCULATORY COMPLICATION, WITHOUT LONG-TERM CURRENT USE OF INSULIN (HCC): ICD-10-CM

## 2021-11-16 DIAGNOSIS — Z13.29 THYROID DISORDER SCREEN: ICD-10-CM

## 2021-11-16 LAB
ALBUMIN SERPL BCP-MCNC: 4.2 G/DL (ref 3.2–4.9)
ALBUMIN/GLOB SERPL: 1.4 G/DL
ALP SERPL-CCNC: 87 U/L (ref 30–99)
ALT SERPL-CCNC: 19 U/L (ref 2–50)
ANION GAP SERPL CALC-SCNC: 11 MMOL/L (ref 7–16)
AST SERPL-CCNC: 30 U/L (ref 12–45)
BASOPHILS # BLD AUTO: 0.6 % (ref 0–1.8)
BASOPHILS # BLD: 0.03 K/UL (ref 0–0.12)
BILIRUB SERPL-MCNC: 0.5 MG/DL (ref 0.1–1.5)
BUN SERPL-MCNC: 20 MG/DL (ref 8–22)
CALCIUM SERPL-MCNC: 10 MG/DL (ref 8.4–10.2)
CHLORIDE SERPL-SCNC: 103 MMOL/L (ref 96–112)
CO2 SERPL-SCNC: 26 MMOL/L (ref 20–33)
CREAT SERPL-MCNC: 0.98 MG/DL (ref 0.5–1.4)
EOSINOPHIL # BLD AUTO: 0.11 K/UL (ref 0–0.51)
EOSINOPHIL NFR BLD: 2.1 % (ref 0–6.9)
ERYTHROCYTE [DISTWIDTH] IN BLOOD BY AUTOMATED COUNT: 51.3 FL (ref 35.9–50)
EST. AVERAGE GLUCOSE BLD GHB EST-MCNC: 140 MG/DL
FASTING STATUS PATIENT QL REPORTED: NORMAL
GLOBULIN SER CALC-MCNC: 3.1 G/DL (ref 1.9–3.5)
GLUCOSE SERPL-MCNC: 142 MG/DL (ref 65–99)
HBA1C MFR BLD: 6.5 % (ref 4–5.6)
HCT VFR BLD AUTO: 38.9 % (ref 37–47)
HGB BLD-MCNC: 12.2 G/DL (ref 12–16)
IMM GRANULOCYTES # BLD AUTO: 0.02 K/UL (ref 0–0.11)
IMM GRANULOCYTES NFR BLD AUTO: 0.4 % (ref 0–0.9)
IRON SATN MFR SERPL: 27 % (ref 15–55)
IRON SERPL-MCNC: 80 UG/DL (ref 40–170)
LYMPHOCYTES # BLD AUTO: 1.51 K/UL (ref 1–4.8)
LYMPHOCYTES NFR BLD: 28.4 % (ref 22–41)
MCH RBC QN AUTO: 33.5 PG (ref 27–33)
MCHC RBC AUTO-ENTMCNC: 31.4 G/DL (ref 33.6–35)
MCV RBC AUTO: 106.9 FL (ref 81.4–97.8)
MONOCYTES # BLD AUTO: 0.53 K/UL (ref 0–0.85)
MONOCYTES NFR BLD AUTO: 10 % (ref 0–13.4)
NEUTROPHILS # BLD AUTO: 3.11 K/UL (ref 2–7.15)
NEUTROPHILS NFR BLD: 58.5 % (ref 44–72)
NRBC # BLD AUTO: 0 K/UL
NRBC BLD-RTO: 0 /100 WBC
PLATELET # BLD AUTO: 237 K/UL (ref 164–446)
PMV BLD AUTO: 8.9 FL (ref 9–12.9)
POTASSIUM SERPL-SCNC: 4.5 MMOL/L (ref 3.6–5.5)
PROT SERPL-MCNC: 7.3 G/DL (ref 6–8.2)
RBC # BLD AUTO: 3.64 M/UL (ref 4.2–5.4)
SODIUM SERPL-SCNC: 140 MMOL/L (ref 135–145)
TIBC SERPL-MCNC: 297 UG/DL (ref 250–450)
TSH SERPL DL<=0.005 MIU/L-ACNC: 1.3 UIU/ML (ref 0.38–5.33)
UIBC SERPL-MCNC: 217 UG/DL (ref 110–370)
VIT B12 SERPL-MCNC: 367 PG/ML (ref 211–911)
WBC # BLD AUTO: 5.3 K/UL (ref 4.8–10.8)

## 2021-11-16 PROCEDURE — 82607 VITAMIN B-12: CPT

## 2021-11-16 PROCEDURE — 80053 COMPREHEN METABOLIC PANEL: CPT

## 2021-11-16 PROCEDURE — 83036 HEMOGLOBIN GLYCOSYLATED A1C: CPT

## 2021-11-16 PROCEDURE — 83550 IRON BINDING TEST: CPT

## 2021-11-16 PROCEDURE — 84443 ASSAY THYROID STIM HORMONE: CPT

## 2021-11-16 PROCEDURE — 36415 COLL VENOUS BLD VENIPUNCTURE: CPT

## 2021-11-16 PROCEDURE — 83540 ASSAY OF IRON: CPT

## 2021-11-16 PROCEDURE — 85025 COMPLETE CBC W/AUTO DIFF WBC: CPT

## 2022-01-04 ENCOUNTER — HOSPITAL ENCOUNTER (OUTPATIENT)
Dept: LAB | Facility: MEDICAL CENTER | Age: 82
End: 2022-01-04
Attending: INTERNAL MEDICINE
Payer: MEDICARE

## 2022-01-04 ENCOUNTER — TELEPHONE (OUTPATIENT)
Dept: MEDICAL GROUP | Facility: MEDICAL CENTER | Age: 82
End: 2022-01-04

## 2022-01-04 LAB
ALT SERPL-CCNC: 19 U/L (ref 2–50)
AST SERPL-CCNC: 24 U/L (ref 12–45)
BASOPHILS # BLD AUTO: 0.6 % (ref 0–1.8)
BASOPHILS # BLD: 0.04 K/UL (ref 0–0.12)
CREAT SERPL-MCNC: 0.75 MG/DL (ref 0.5–1.4)
EOSINOPHIL # BLD AUTO: 0.08 K/UL (ref 0–0.51)
EOSINOPHIL NFR BLD: 1.2 % (ref 0–6.9)
ERYTHROCYTE [DISTWIDTH] IN BLOOD BY AUTOMATED COUNT: 51.8 FL (ref 35.9–50)
ERYTHROCYTE [SEDIMENTATION RATE] IN BLOOD BY WESTERGREN METHOD: 41 MM/HOUR (ref 0–25)
HCT VFR BLD AUTO: 38.9 % (ref 37–47)
HGB BLD-MCNC: 12.4 G/DL (ref 12–16)
IMM GRANULOCYTES # BLD AUTO: 0.02 K/UL (ref 0–0.11)
IMM GRANULOCYTES NFR BLD AUTO: 0.3 % (ref 0–0.9)
LYMPHOCYTES # BLD AUTO: 1.2 K/UL (ref 1–4.8)
LYMPHOCYTES NFR BLD: 18.3 % (ref 22–41)
MCH RBC QN AUTO: 33.4 PG (ref 27–33)
MCHC RBC AUTO-ENTMCNC: 31.9 G/DL (ref 33.6–35)
MCV RBC AUTO: 104.9 FL (ref 81.4–97.8)
MONOCYTES # BLD AUTO: 0.56 K/UL (ref 0–0.85)
MONOCYTES NFR BLD AUTO: 8.5 % (ref 0–13.4)
NEUTROPHILS # BLD AUTO: 4.67 K/UL (ref 2–7.15)
NEUTROPHILS NFR BLD: 71.1 % (ref 44–72)
NRBC # BLD AUTO: 0 K/UL
NRBC BLD-RTO: 0 /100 WBC
PLATELET # BLD AUTO: 281 K/UL (ref 164–446)
PMV BLD AUTO: 8.7 FL (ref 9–12.9)
RBC # BLD AUTO: 3.71 M/UL (ref 4.2–5.4)
WBC # BLD AUTO: 6.6 K/UL (ref 4.8–10.8)

## 2022-01-04 PROCEDURE — 84450 TRANSFERASE (AST) (SGOT): CPT

## 2022-01-04 PROCEDURE — 85652 RBC SED RATE AUTOMATED: CPT

## 2022-01-04 PROCEDURE — 85025 COMPLETE CBC W/AUTO DIFF WBC: CPT

## 2022-01-04 PROCEDURE — 36415 COLL VENOUS BLD VENIPUNCTURE: CPT

## 2022-01-04 PROCEDURE — 82565 ASSAY OF CREATININE: CPT

## 2022-01-04 PROCEDURE — 84460 ALANINE AMINO (ALT) (SGPT): CPT

## 2022-01-04 NOTE — TELEPHONE ENCOUNTER
Phone Number Called: [phone] (home) [phone] (work)  Nathaniel Harman    Call outcome: Spoke to patient regarding message below.    Message: Patient's  had called wanting to see about sooner appointment due to patient having severe pain in her side. She states it subsided when called back, but appointment has been bumped up to 1/11/22 @ 2:00 just in case.

## 2022-01-05 ENCOUNTER — TELEPHONE (OUTPATIENT)
Dept: MEDICAL GROUP | Facility: MEDICAL CENTER | Age: 82
End: 2022-01-05

## 2022-01-07 NOTE — PROGRESS NOTES
Subjective:     CC:  Diagnoses of Primary hypertension, Pure hypercholesterolemia, Type 2 diabetes mellitus with other circulatory complication, without long-term current use of insulin (HCC), Rheumatoid arthritis involving multiple joints (HCC), Atherosclerosis of aorta (HCC), Allergic rhinitis, unspecified seasonality, unspecified trigger, and Encounter to establish care with new doctor were pertinent to this visit.    HISTORY OF THE PRESENT ILLNESS: Patient is a 81 y.o. female. This pleasant patient is here today to establish care and discuss chronic conditions. Her prior PCP was ALTAGRACIA Weiner.    Problem   Allergic Rhinitis    Chronic condition for many years. Has tried Flonase nasal spray without relief. She is currently taking Zyrtec as needed with mild relief.     Atherosclerosis of Aorta (Hcc)    Chronic condition  Current regimen: atorvastatin 40mg qhs       Rheumatoid Arthritis Involving Multiple Joints (Hcc)    Chronic condition. Followed by rheum Dr. Acosta.  Current regimen: methotrexate 2.5mg weekly and folic acid 1mg daily  She reports compliance and/or tolerance and symptoms controlled with current medication(s). Does not need medication(s) refill. No acute concerns.       Hld (Hyperlipidemia)    Chronic condition  Current regimen: atorvastatin 40mg daily.   She reports compliance and tolerating medication well. Denies musculoskeletal pain, headache, diarrhea. She does not need medication refill today. No acute concerns at this time.       Type 2 Diabetes Mellitus With Circulatory Disorder, Without Long-Term Current Use of Insulin (Hcc)    Chronic condition. Onset around around age 62.  Current medication regimen: metformin 1000mg BID, Actos 15mg daily  Patient tolerating and reports compliant with medications. Does not need refill of medications today. Denies vision changes, dry mouth, chest pain, nausea/vomiting/diarrhea, polydipsia, polyphagia, polyuria, hypoglycemia, numbness/tingling. She  checks her feet at home.  Last eye exam: due, she has appointment next month, no DR from before  Last foot exam: due  Diet: tries to eat healthy in general  Exercise: stretching  Vaccines: flu 11/2021, PPSV23 completed 12/2008, Tdap received 04/2019       Hypertension    Chronic condition  Current medication regimen: amlodipine 5mg daily, HCTZ 12.5mg daily, losartan 100mg daily, spironolactone 25mg BID  Patient tolerating and reports compliant with medications. She does not regularly monitor blood pressure at home. Does not need refill of medications today. Denies headache, dizziness, vision changes, chest pain, dyspnea, edema.           Current Outpatient Medications Ordered in Epic   Medication Sig Dispense Refill   • montelukast (SINGULAIR) 10 MG Tab Take 1 Tablet by mouth every day. 30 Tablet 3   • metformin (GLUCOPHAGE) 1000 MG tablet metformin 1,000 mg tablet     • hydrochlorothiazide (MICROZIDE) 12.5 MG capsule Take 1 capsule by mouth every day. 30 capsule 5   • Azelastine HCl 0.15 % Solution AZELASTINE HCL 0.15 % SOLN     • Blood Glucose Monitoring Suppl (BLOOD GLUCOSE MONITOR SYSTEM) w/Device Kit BLOOD GLUCOSE MONITOR SYSTEM w/Device KIT     • dorzolamide-timolol (COSOPT) 22.3-6.8 MG/ML Solution      • Zoster Vac Recomb Adjuvanted (SHINGRIX) 50 MCG/0.5ML Recon Susp SHINGRIX 50 MCG/0.5ML SUSR     • losartan (COZAAR) 100 MG Tab Take 100 mg by mouth every day.     • pioglitazone (ACTOS) 15 MG Tab Take 15 mg by mouth every day.     • glucose blood strip 1 Strip by Other route every day. 100 Strip 3   • atorvastatin (LIPITOR) 40 MG Tab Take 40 mg by mouth every evening.     • methotrexate 2.5 MG tablet Take 2.5 mg by mouth every 7 days.     • spironolactone (ALDACTONE) 25 MG Tab Take 25 mg by mouth 2 times a day.     • folic acid (FOLVITE) 1 MG Tab Take 1 mg by mouth every day.     • multivitamin (THERAGRAN) TABS Take 1 Tab by mouth every day.     • omeprazole (PRILOSEC) 20 MG CPDR Take 20 mg by mouth every day.  "      No current [facility]-ordered facility-administered medications on file.     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  Alcohol/tobacco/illicit drugs: denies  Baseline functional status: independent with ADLs    Health Maintenance: reviewed and discussed with patient  Vaccines: flu received 11/2021, Shingrix completed 11/2021, PPSV23 completed 12/2008, Tdap received 04/2019  Pap smear 01/2021  Mammogram 11/2020 benign    ROS:   Gen: no fevers/chills, no changes in weight  Eyes: no changes in vision  ENT: no sore throat, + postnasal drip  Pulm: no sob, no cough  CV: no chest pain, no palpitations  GI: no nausea/vomiting, no diarrhea  : no dysuria  MSk: no myalgias  Skin: no rash  Neuro: no headaches      Objective:     Exam: /64 (BP Location: Left arm, Patient Position: Sitting, BP Cuff Size: Adult)   Pulse 75   Temp 37 °C (98.6 °F) (Temporal)   Resp 16   Ht 1.549 m (5' 1\")   Wt 62 kg (136 lb 11 oz)   SpO2 94%  Body mass index is 25.83 kg/m².    General: Normal appearing. No distress.  HEENT: Normocephalic. Eyes conjunctiva clear lids without ptosis, ears normal shape and contour, canals are clear bilaterally, tympanic membranes are benign, nasal mucosa benign, oropharynx is without erythema, edema or exudates.   Neck: Supple without JVD or bruit. Thyroid is not enlarged.  Pulmonary: Clear to ausculation.  Normal effort. No rales, ronchi, or wheezing.  Cardiovascular: Regular rate and rhythm without murmur. Carotid and radial pulses are intact and equal bilaterally.  Abdomen: Soft, nontender, nondistended. Normal bowel sounds.  Neurologic: Grossly nonfocal  Skin: Warm and dry.  No obvious lesions.  Musculoskeletal: Normal gait. No extremity cyanosis, clubbing, or edema.  Psych: Normal mood and affect. Alert and oriented x3. Judgment and insight is normal.  Monofilament " testing with a 10 gram force: sensation intact: intact bilaterally  Visual Inspection: Feet without maceration, ulcers, fissures.  Pedal pulses: intact bilaterally      Labs:   Lab Results   Component Value Date/Time    WBC 6.6 01/04/2022 10:32 AM    RBC 3.71 (L) 01/04/2022 10:32 AM    HEMOGLOBIN 12.4 01/04/2022 10:32 AM    HEMATOCRIT 38.9 01/04/2022 10:32 AM    .9 (H) 01/04/2022 10:32 AM    MCH 33.4 (H) 01/04/2022 10:32 AM    MCHC 31.9 (L) 01/04/2022 10:32 AM    RDW 51.8 (H) 01/04/2022 10:32 AM    PLATELETCT 281 01/04/2022 10:32 AM    MPV 8.7 (L) 01/04/2022 10:32 AM      Lab Results   Component Value Date/Time    SODIUM 140 11/16/2021 08:56 AM    POTASSIUM 4.5 11/16/2021 08:56 AM    CHLORIDE 103 11/16/2021 08:56 AM    CO2 26 11/16/2021 08:56 AM    ANION 11.0 11/16/2021 08:56 AM    GLUCOSE 142 (H) 11/16/2021 08:56 AM    BUN 20 11/16/2021 08:56 AM    CREATININE 0.75 01/04/2022 10:32 AM    CALCIUM 10.0 11/16/2021 08:56 AM    ASTSGOT 24 01/04/2022 10:32 AM    ALTSGPT 19 01/04/2022 10:32 AM    TBILIRUBIN 0.5 11/16/2021 08:56 AM    ALBUMIN 4.2 11/16/2021 08:56 AM    TOTPROTEIN 7.3 11/16/2021 08:56 AM    GLOBULIN 3.1 11/16/2021 08:56 AM    AGRATIO 1.4 11/16/2021 08:56 AM     Lab Results   Component Value Date/Time    HBA1C 6.5 (H) 11/16/2021 08:56 AM      Lab Results   Component Value Date/Time    CHOLSTRLTOT 146 06/24/2021 0754    TRIGLYCERIDE 148 06/24/2021 0754    HDL 44 06/24/2021 0754    LDL 72 06/24/2021 0754     Lab Results   Component Value Date/Time    TSHULTNAUNEN 1.300 11/16/2021 0856       Assessment & Plan:   81 y.o. female with the following -    1. Primary hypertension  Chronic condition, controlled with medications. Blood pressure at goal of < 140/90.  - cont current medication: spironolactone 25mg BID  - encouraged home blood pressure monitoring using an appropriately fitting upper-arm cuff on a bare arm, emptying the bladder, avoiding caffeinated beverages for 30 minutes before taking the  measurement, resting for five minutes before taking the measurement, keeping the feet on the floor uncrossed and the arm supported with the cuff at heart level, and not talking during the reading, bring in home blood pressure monitoring device with readings to next follow up visit  - encouraged dietary changes include salt reduction, moderation of alcohol consumption, and a diet high in vegetables and fruit that is low in added sugars and saturated fats (e.g., DASH diet); aerobic and resistance exercises for at least 30 minutes or more at least 3-5 days per week; smoking cessation and stress reduction    2. Pure hypercholesterolemia  Chronic condition, controlled with medications.  - advised to maintain a healthy weight, regular aerobic exercise, and eating diets lower in saturated fats  - cont current regimen: atorvastatin 40mg qhs    3. Type 2 diabetes mellitus with other circulatory complication, without long-term current use of insulin (HCC)  Chronic condition, controlled with medications. Most recent A1C 6.5.  - cont current medications: metformin 1000mg BID, Actos 15mg daily  - Discussed daily self foot exam, eye care, and regular exercise with patient  - Patient instructed to follow a low carbohydrate diet  - Follow up in 6 months or earlier as needed    4. Rheumatoid arthritis involving multiple joints (HCC)  Chronic condition, stable.  - cont methotrexate 2.5mg weekly and folic acid 1mg daily  - cont regular f/u with rheumatology Dr. Acosta    5. Atherosclerosis of aorta (HCC)  Chronic condition, stable.  - cont atorvastatin 40mg daily    6. Allergic rhinitis  Chronic condition, uncontrolled. Has tried Flonase and Zyrtec without good relief.  - trial montelukast 10mg daily per order    7. Encounter to establish care with new doctor  - f/u 6 months for follow up    Other orders  - metformin (GLUCOPHAGE) 1000 MG tablet; metformin 1,000 mg tablet        Return in about 6 months (around 7/11/2022) for chronic  medical conditions.    Please note that this dictation was created using voice recognition software. I have made every reasonable attempt to correct obvious errors, but I expect that there are errors of grammar and possibly content that I did not discover before finalizing the note.

## 2022-01-11 ENCOUNTER — HOSPITAL ENCOUNTER (OUTPATIENT)
Facility: MEDICAL CENTER | Age: 82
End: 2022-01-11
Attending: STUDENT IN AN ORGANIZED HEALTH CARE EDUCATION/TRAINING PROGRAM
Payer: MEDICARE

## 2022-01-11 ENCOUNTER — OFFICE VISIT (OUTPATIENT)
Dept: MEDICAL GROUP | Facility: MEDICAL CENTER | Age: 82
End: 2022-01-11
Payer: MEDICARE

## 2022-01-11 VITALS
TEMPERATURE: 98.6 F | RESPIRATION RATE: 16 BRPM | OXYGEN SATURATION: 94 % | DIASTOLIC BLOOD PRESSURE: 64 MMHG | WEIGHT: 136.69 LBS | HEIGHT: 61 IN | SYSTOLIC BLOOD PRESSURE: 118 MMHG | HEART RATE: 75 BPM | BODY MASS INDEX: 25.81 KG/M2

## 2022-01-11 DIAGNOSIS — Z76.89 ENCOUNTER TO ESTABLISH CARE WITH NEW DOCTOR: ICD-10-CM

## 2022-01-11 DIAGNOSIS — I10 PRIMARY HYPERTENSION: ICD-10-CM

## 2022-01-11 DIAGNOSIS — E11.59 TYPE 2 DIABETES MELLITUS WITH OTHER CIRCULATORY COMPLICATION, WITHOUT LONG-TERM CURRENT USE OF INSULIN (HCC): ICD-10-CM

## 2022-01-11 DIAGNOSIS — J30.9 ALLERGIC RHINITIS, UNSPECIFIED SEASONALITY, UNSPECIFIED TRIGGER: ICD-10-CM

## 2022-01-11 DIAGNOSIS — M06.9 RHEUMATOID ARTHRITIS INVOLVING MULTIPLE JOINTS (HCC): ICD-10-CM

## 2022-01-11 DIAGNOSIS — I70.0 ATHEROSCLEROSIS OF AORTA (HCC): ICD-10-CM

## 2022-01-11 DIAGNOSIS — E78.00 PURE HYPERCHOLESTEROLEMIA: ICD-10-CM

## 2022-01-11 PROCEDURE — 99214 OFFICE O/P EST MOD 30 MIN: CPT | Performed by: STUDENT IN AN ORGANIZED HEALTH CARE EDUCATION/TRAINING PROGRAM

## 2022-01-11 PROCEDURE — 82570 ASSAY OF URINE CREATININE: CPT

## 2022-01-11 PROCEDURE — 82043 UR ALBUMIN QUANTITATIVE: CPT

## 2022-01-11 RX ORDER — MONTELUKAST SODIUM 10 MG/1
10 TABLET ORAL DAILY
Qty: 30 TABLET | Refills: 3 | Status: SHIPPED | OUTPATIENT
Start: 2022-01-11 | End: 2022-07-12

## 2022-01-11 ASSESSMENT — FIBROSIS 4 INDEX: FIB4 SCORE: 1.55

## 2022-01-11 ASSESSMENT — PATIENT HEALTH QUESTIONNAIRE - PHQ9: CLINICAL INTERPRETATION OF PHQ2 SCORE: 0

## 2022-01-11 NOTE — LETTER
[facility]  Philippe Krishna D.O.  [address]  [place] [address]  Fax: [phone]   Authorization for Release/Disclosure of   Protected Health Information   Name: JEWELL HERNADEZ : 1940 SSN: [id number]   Address: [address] [place] [address]  [place] [address] Phone:    [phone] (home) [phone] (work)   I authorize the entity listed below to release/disclose the PHI below to:   [facility]/Philippe Krishna D.O. and Philippe Krishna D.O.   Provider or Entity Name:  [facility], Dr. Milton Salomon   Address   [facility]   Phone:      Fax:     Reason for request: continuity of care   Information to be released:    [  ] LAST COLONOSCOPY,  including any PATH REPORT and follow-up  [  ] LAST FIT/COLOGUARD RESULT [  ] LAST DEXA  [  ] LAST MAMMOGRAM  [  ] LAST PAP  [  ] LAST LABS [ X] RETINA EXAM REPORT  [  ] IMMUNIZATION RECORDS  [  ] Release all info      [  ] Check here and initial the line next to each item to release ALL health information INCLUDING  _____ Care and treatment for drug and / or alcohol abuse  _____ HIV testing, infection status, or AIDS  _____ Genetic Testing    DATES OF SERVICE OR TIME PERIOD TO BE DISCLOSED: _____________  I understand and acknowledge that:  * This Authorization may be revoked at any time by you in writing, except if your health information has already been used or disclosed.  * Your health information that will be used or disclosed as a result of you signing this authorization could be re-disclosed by the recipient. If this occurs, your re-disclosed health information may no longer be protected by State or Federal laws.  * You may refuse to sign this Authorization. Your refusal will not affect your ability to obtain treatment.  * This Authorization becomes effective upon signing and will  on (date) __________.      If no date is indicated, this Authorization will  one (1) year from the signature date.    Name: Jewell Roper  Emily    Signature:   Date:     1/11/2022       PLEASE FAX REQUESTED RECORDS BACK TO: [phone]

## 2022-01-12 LAB
CREAT UR-MCNC: 33.45 MG/DL
MICROALBUMIN UR-MCNC: <1.2 MG/DL
MICROALBUMIN/CREAT UR: NORMAL MG/G (ref 0–30)

## 2022-02-15 ENCOUNTER — HOSPITAL ENCOUNTER (OUTPATIENT)
Dept: RADIOLOGY | Facility: MEDICAL CENTER | Age: 82
End: 2022-02-15
Attending: NURSE PRACTITIONER
Payer: MEDICARE

## 2022-02-15 DIAGNOSIS — Z12.31 VISIT FOR SCREENING MAMMOGRAM: ICD-10-CM

## 2022-02-15 PROCEDURE — 77063 BREAST TOMOSYNTHESIS BI: CPT

## 2022-03-17 RX ORDER — ATORVASTATIN CALCIUM 40 MG/1
40 TABLET, FILM COATED ORAL NIGHTLY
Qty: 100 TABLET | Refills: 3 | Status: SHIPPED | OUTPATIENT
Start: 2022-03-17 | End: 2022-07-12 | Stop reason: SDUPTHER

## 2022-03-25 ENCOUNTER — PATIENT MESSAGE (OUTPATIENT)
Dept: HEALTH INFORMATION MANAGEMENT | Facility: OTHER | Age: 82
End: 2022-03-25

## 2022-03-30 NOTE — TELEPHONE ENCOUNTER
Medical records reviewed by this RN CM. Met with parents at bedside. Patient lives with Family in [place]. His insurance is through [facility] [employer]. His pediatrician is Cadence LANDIS.     Pt discussed in AM rounds. Notified by MATT German that EZ on vest has been ordered. Verbal choice obtained from mother. Choice form faxed to DPA. DPA notified.    Pt anticipated to d/c home with parents once medically cleared. Will continue to follow for discharge needs.     NEW PATIENT VISIT PRE-VISIT PLANNING    Patient WAS NOT contacted for New Patient Pre-Visit Planning, NU2U patient.     1.  [employer] Patient is checked in Patient Demographics?Yes    2.  Immunizations were updated in Epic using Reconcile Outside Information activity? Yes. Immunizations queried through Web[employer] and reconciled from outside sources. Immunization records are up to date.         3.  Is this appointment scheduled as a Hospital Follow-Up? No    4.  Patient is due for the following Health Maintenance Topics:   Health Maintenance Due   Topic Date Due   • DIABETES MONOFILAMENT / LE EXAM  Never done   • RETINAL SCREENING  07/25/2021   • MAMMOGRAM  11/09/2021   • URINE ACR / MICROALBUMIN  01/18/2022       5.  Reviewed/Updated the following:  · Preferred Pharmacy? Yes  · Preferred Lab? Yes  · Preferred Communication? Yes  · Allergies? Yes; One allergy from outside source not reconciled to patient's chart. Lisinopril allergy previously deleted by provider.   · Medications? YES. Was Abstract Encounter opened and chart updated? NO; One medication from outside source already present on patient chart. Medication not reconciled.   · Social History? Yes  · Family History (document living status of immediate family members and if + hx of cancer, diabetes, hypertension, hyperlipidemia, heart attack, stroke) No    6.  Updated Care Team?  · DME Company (gait device, O2, CPAP, etc.) N\A  · Other Specialists (eye doctor, derm, GYN, cardiology, endo, etc): N\A    7.  AHA (Puls8) form printed for Provider? No, patient does not have any open alerts         This Pre-Visit Planning note has been created for the Provider and Medical Assistant to review prior to the patient's office appointment.   Patient is NOT REQUIRED to follow-up on this note.

## 2022-05-03 ENCOUNTER — TELEPHONE (OUTPATIENT)
Dept: HEALTH INFORMATION MANAGEMENT | Facility: OTHER | Age: 82
End: 2022-05-03
Payer: MEDICARE

## 2022-05-03 NOTE — TELEPHONE ENCOUNTER
Called to [facility] HEALTH ASSESSMENT. LVM stating I was calling regarding a Preventive Screening and to update  file (verify PCP)    Please transfer to Member to Outreach Team at [phone] when patient returns call.

## 2022-05-17 PROBLEM — E11.65 TYPE 2 DIABETES MELLITUS WITH HYPERGLYCEMIA, WITHOUT LONG-TERM CURRENT USE OF INSULIN (HCC): Status: ACTIVE | Noted: 2022-05-17

## 2022-05-17 PROBLEM — E66.3 OVERWEIGHT WITH BODY MASS INDEX (BMI) OF 27 TO 27.9 IN ADULT: Status: ACTIVE | Noted: 2022-05-17

## 2022-05-17 PROBLEM — R94.30 NONSPECIFIC ABNORMAL FUNCTION STUDY, CARDIOVASCULAR: Status: ACTIVE | Noted: 2022-05-17

## 2022-06-21 ENCOUNTER — HOSPITAL ENCOUNTER (OUTPATIENT)
Dept: LAB | Facility: MEDICAL CENTER | Age: 82
End: 2022-06-21
Attending: INTERNAL MEDICINE
Payer: MEDICARE

## 2022-06-21 ENCOUNTER — HOSPITAL ENCOUNTER (OUTPATIENT)
Dept: LAB | Facility: MEDICAL CENTER | Age: 82
End: 2022-06-21
Attending: STUDENT IN AN ORGANIZED HEALTH CARE EDUCATION/TRAINING PROGRAM
Payer: MEDICARE

## 2022-06-21 DIAGNOSIS — I70.0 ATHEROSCLEROSIS OF AORTA (HCC): ICD-10-CM

## 2022-06-21 DIAGNOSIS — M06.9 RHEUMATOID ARTHRITIS INVOLVING MULTIPLE JOINTS (HCC): ICD-10-CM

## 2022-06-21 DIAGNOSIS — E11.59 TYPE 2 DIABETES MELLITUS WITH OTHER CIRCULATORY COMPLICATION, WITHOUT LONG-TERM CURRENT USE OF INSULIN (HCC): ICD-10-CM

## 2022-06-21 DIAGNOSIS — I10 PRIMARY HYPERTENSION: ICD-10-CM

## 2022-06-21 DIAGNOSIS — E78.00 PURE HYPERCHOLESTEROLEMIA: ICD-10-CM

## 2022-06-21 LAB
ALBUMIN SERPL BCP-MCNC: 4.1 G/DL (ref 3.2–4.9)
ALBUMIN/GLOB SERPL: 1.4 G/DL
ALP SERPL-CCNC: 76 U/L (ref 30–99)
ALT SERPL-CCNC: 23 U/L (ref 2–50)
ALT SERPL-CCNC: 23 U/L (ref 2–50)
ANION GAP SERPL CALC-SCNC: 12 MMOL/L (ref 7–16)
AST SERPL-CCNC: 24 U/L (ref 12–45)
AST SERPL-CCNC: 25 U/L (ref 12–45)
BASOPHILS # BLD AUTO: 0.5 % (ref 0–1.8)
BASOPHILS # BLD AUTO: 0.5 % (ref 0–1.8)
BASOPHILS # BLD: 0.03 K/UL (ref 0–0.12)
BASOPHILS # BLD: 0.03 K/UL (ref 0–0.12)
BILIRUB SERPL-MCNC: 0.4 MG/DL (ref 0.1–1.5)
BUN SERPL-MCNC: 21 MG/DL (ref 8–22)
CALCIUM SERPL-MCNC: 9.7 MG/DL (ref 8.4–10.2)
CHLORIDE SERPL-SCNC: 102 MMOL/L (ref 96–112)
CHOLEST SERPL-MCNC: 137 MG/DL (ref 100–199)
CO2 SERPL-SCNC: 25 MMOL/L (ref 20–33)
CREAT SERPL-MCNC: 0.68 MG/DL (ref 0.5–1.4)
CREAT SERPL-MCNC: 0.71 MG/DL (ref 0.5–1.4)
EOSINOPHIL # BLD AUTO: 0.14 K/UL (ref 0–0.51)
EOSINOPHIL # BLD AUTO: 0.15 K/UL (ref 0–0.51)
EOSINOPHIL NFR BLD: 2.5 % (ref 0–6.9)
EOSINOPHIL NFR BLD: 2.7 % (ref 0–6.9)
ERYTHROCYTE [DISTWIDTH] IN BLOOD BY AUTOMATED COUNT: 53.9 FL (ref 35.9–50)
ERYTHROCYTE [DISTWIDTH] IN BLOOD BY AUTOMATED COUNT: 54.4 FL (ref 35.9–50)
ERYTHROCYTE [SEDIMENTATION RATE] IN BLOOD BY WESTERGREN METHOD: 18 MM/HOUR (ref 0–25)
EST. AVERAGE GLUCOSE BLD GHB EST-MCNC: 143 MG/DL
FASTING STATUS PATIENT QL REPORTED: NORMAL
GFR SERPLBLD CREATININE-BSD FMLA CKD-EPI: 86 ML/MIN/1.73 M 2
GFR SERPLBLD CREATININE-BSD FMLA CKD-EPI: 88 ML/MIN/1.73 M 2
GLOBULIN SER CALC-MCNC: 3 G/DL (ref 1.9–3.5)
GLUCOSE SERPL-MCNC: 111 MG/DL (ref 65–99)
HBA1C MFR BLD: 6.6 % (ref 4–5.6)
HCT VFR BLD AUTO: 38.1 % (ref 37–47)
HCT VFR BLD AUTO: 38.6 % (ref 37–47)
HDLC SERPL-MCNC: 47 MG/DL
HGB BLD-MCNC: 12.1 G/DL (ref 12–16)
HGB BLD-MCNC: 12.3 G/DL (ref 12–16)
IMM GRANULOCYTES # BLD AUTO: 0.01 K/UL (ref 0–0.11)
IMM GRANULOCYTES # BLD AUTO: 0.02 K/UL (ref 0–0.11)
IMM GRANULOCYTES NFR BLD AUTO: 0.2 % (ref 0–0.9)
IMM GRANULOCYTES NFR BLD AUTO: 0.4 % (ref 0–0.9)
LDLC SERPL CALC-MCNC: 65 MG/DL
LYMPHOCYTES # BLD AUTO: 1.39 K/UL (ref 1–4.8)
LYMPHOCYTES # BLD AUTO: 1.43 K/UL (ref 1–4.8)
LYMPHOCYTES NFR BLD: 25 % (ref 22–41)
LYMPHOCYTES NFR BLD: 25.7 % (ref 22–41)
MCH RBC QN AUTO: 33.9 PG (ref 27–33)
MCH RBC QN AUTO: 34 PG (ref 27–33)
MCHC RBC AUTO-ENTMCNC: 31.8 G/DL (ref 33.6–35)
MCHC RBC AUTO-ENTMCNC: 31.9 G/DL (ref 33.6–35)
MCV RBC AUTO: 106.6 FL (ref 81.4–97.8)
MCV RBC AUTO: 106.7 FL (ref 81.4–97.8)
MONOCYTES # BLD AUTO: 0.57 K/UL (ref 0–0.85)
MONOCYTES # BLD AUTO: 0.57 K/UL (ref 0–0.85)
MONOCYTES NFR BLD AUTO: 10.2 % (ref 0–13.4)
MONOCYTES NFR BLD AUTO: 10.3 % (ref 0–13.4)
NEUTROPHILS # BLD AUTO: 3.38 K/UL (ref 2–7.15)
NEUTROPHILS # BLD AUTO: 3.41 K/UL (ref 2–7.15)
NEUTROPHILS NFR BLD: 60.7 % (ref 44–72)
NEUTROPHILS NFR BLD: 61.3 % (ref 44–72)
NRBC # BLD AUTO: 0 K/UL
NRBC # BLD AUTO: 0 K/UL
NRBC BLD-RTO: 0 /100 WBC
NRBC BLD-RTO: 0 /100 WBC
PLATELET # BLD AUTO: 258 K/UL (ref 164–446)
PLATELET # BLD AUTO: 261 K/UL (ref 164–446)
PMV BLD AUTO: 9.2 FL (ref 9–12.9)
PMV BLD AUTO: 9.3 FL (ref 9–12.9)
POTASSIUM SERPL-SCNC: 4.3 MMOL/L (ref 3.6–5.5)
PROT SERPL-MCNC: 7.1 G/DL (ref 6–8.2)
RBC # BLD AUTO: 3.57 M/UL (ref 4.2–5.4)
RBC # BLD AUTO: 3.62 M/UL (ref 4.2–5.4)
SODIUM SERPL-SCNC: 139 MMOL/L (ref 135–145)
TRIGL SERPL-MCNC: 125 MG/DL (ref 0–149)
WBC # BLD AUTO: 5.6 K/UL (ref 4.8–10.8)
WBC # BLD AUTO: 5.6 K/UL (ref 4.8–10.8)

## 2022-06-21 PROCEDURE — 80053 COMPREHEN METABOLIC PANEL: CPT

## 2022-06-21 PROCEDURE — 84450 TRANSFERASE (AST) (SGOT): CPT

## 2022-06-21 PROCEDURE — 85025 COMPLETE CBC W/AUTO DIFF WBC: CPT | Mod: 91

## 2022-06-21 PROCEDURE — 82565 ASSAY OF CREATININE: CPT

## 2022-06-21 PROCEDURE — 84460 ALANINE AMINO (ALT) (SGPT): CPT

## 2022-06-21 PROCEDURE — 83036 HEMOGLOBIN GLYCOSYLATED A1C: CPT

## 2022-06-21 PROCEDURE — 36415 COLL VENOUS BLD VENIPUNCTURE: CPT

## 2022-06-21 PROCEDURE — 80061 LIPID PANEL: CPT

## 2022-06-21 PROCEDURE — 85652 RBC SED RATE AUTOMATED: CPT

## 2022-06-21 PROCEDURE — 85025 COMPLETE CBC W/AUTO DIFF WBC: CPT

## 2022-07-10 NOTE — PROGRESS NOTES
Subjective:     CC: follow up    HPI:   Jewell presents today for follow up    Problem   Dysuria    Acute condition. She reports burning with urination and itchy. Denies hematuria, vaginal discharge.     Hypodense Mass of Liver    Chronic condition. US liver 06/2021 showed 2.3 cm solid hypoechoic mass superior to the liver. Additional 2.6 cm hypoechoic mass in the peripheral posterior right hepatic lobe with punctate echogenic foci in the periphery. The masses was seen on prior CT and are   indeterminant. Continued follow-up is recommended.     Fatigue    Chronic condition. She has been feeling tired all the times. Vitamin B12 has been low normal in the past. Denies bloody stool.     Hld (Hyperlipidemia)    Chronic condition.  Current regimen: atorvastatin 40mg daily.   She reports compliance and tolerating medication well. Denies musculoskeletal pain, headache, diarrhea. She does not need medication refill today. No acute concerns at this time.       Type 2 Diabetes Mellitus With Circulatory Disorder, Without Long-Term Current Use of Insulin (Hcc)    Chronic condition. Onset around around age 62.  Current medication regimen: metformin 1000mg BID, Actos 15mg daily  Patient tolerating and reports compliant with medications. Does not need refill of medications today. Denies vision changes, dry mouth, chest pain, nausea/vomiting/diarrhea, polydipsia, polyphagia, polyuria, hypoglycemia, numbness/tingling. She checks her feet at home.  Last eye exam: due, she has appointment next month, no DR from before  Last foot exam: due  Diet: tries to eat healthy in general  Exercise: stretching  Vaccines: flu 11/2021, PPSV23 completed 12/2008, Tdap received 04/2019       Hypertension    Chronic condition.  Current medication regimen: amlodipine 5mg daily, HCTZ 12.5mg daily, losartan 100mg daily, spironolactone 25mg BID  Patient tolerating and reports compliant with medications. She does not regularly monitor blood pressure at  home. Does not need refill of medications today. Denies headache, dizziness, vision changes, chest pain, dyspnea, edema.           Current Outpatient Medications Ordered in Epic   Medication Sig Dispense Refill   • metformin (GLUCOPHAGE) 1000 MG tablet metformin 1,000 mg tablet 180 Tablet 3   • pioglitazone (ACTOS) 15 MG Tab Take 1 Tablet by mouth every day. 90 Tablet 3   • atorvastatin (LIPITOR) 40 MG Tab Take 1 Tablet by mouth every evening. 100 Tablet 3   • losartan (COZAAR) 100 MG Tab Take 1 Tablet by mouth every day. 90 Tablet 3   • hydrochlorothiazide (MICROZIDE) 12.5 MG capsule Take 1 Capsule by mouth every day. 100 Capsule 3   • spironolactone (ALDACTONE) 25 MG Tab Take 1 Tablet by mouth 2 times a day. 180 Tablet 3   • glucose blood strip 1 Strip by Other route every day. 100 Strip 3   • Blood Glucose Monitoring Suppl (BLOOD GLUCOSE MONITOR SYSTEM) w/Device Kit BLOOD GLUCOSE MONITOR SYSTEM w/Device KIT     • dorzolamide-timolol (COSOPT) 22.3-6.8 MG/ML Solution      • methotrexate 2.5 MG tablet Take 2.5 mg by mouth every 7 days.     • folic acid (FOLVITE) 1 MG Tab Take 1 mg by mouth every day.     • multivitamin (THERAGRAN) TABS Take 1 Tab by mouth every day.     • omeprazole (PRILOSEC) 20 MG CPDR Take 20 mg by mouth every day.       Current Facility-Administered Medications Ordered in Epic   Medication Dose Route Frequency Provider Last Rate Last Admin   • cyanocobalamin (VITAMIN B-12) injection 1,000 mcg  1,000 mcg Intramuscular Once Philippe Krishna D.O.         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  Alcohol/tobacco/illicit drugs: denies  Baseline functional status: independent with ADLs     Health Maintenance: reviewed and discussed with patient  Vaccines: flu received 11/2021, Shingrix completed 11/2021, PPSV23 completed 12/2008, Tdap received 04/2019, Moderna COVID #3  "received 11/2021  Pap smear 01/2021  Mammogram 11/2020 benign     ROS:   Gen: no fevers/chills, no changes in weight, + fatigue  Eyes: no changes in vision  ENT: no sore throat  Pulm: no sob, no cough  CV: no chest pain, no palpitations  GI: no nausea/vomiting, no diarrhea  : + dysuria  MSk: no myalgias  Skin: no rash  Neuro: no headaches    Objective:     Exam:  /62 (BP Location: Left arm, Patient Position: Sitting, BP Cuff Size: Adult)   Pulse 88   Temp 36.3 °C (97.4 °F) (Temporal)   Resp 16   Ht 1.549 m (5' 1\")   Wt 63 kg (138 lb 14.2 oz)   SpO2 96%   BMI 26.24 kg/m²  Body mass index is 26.24 kg/m².    General: Normal appearing. No distress.  Neck: Supple without JVD or bruit. Thyroid is not enlarged.  Pulmonary: Clear to ausculation. Normal effort. No rales, ronchi, or wheezing.  Cardiovascular: Regular rate and rhythm without murmur.  Abdomen: Soft, nontender, nondistended. Normal bowel sounds.  Neurologic: Grossly nonfocal  Skin: Warm and dry.  No obvious lesions.  Musculoskeletal: Normal gait. No extremity cyanosis, clubbing, or edema.  Psych: Normal mood and affect. Alert and oriented x3. Judgment and insight is normal.    Labs:   Lab Results   Component Value Date/Time    WBC 5.6 06/21/2022 08:58 AM    RBC 3.62 (L) 06/21/2022 08:58 AM    HEMOGLOBIN 12.3 06/21/2022 08:58 AM    HEMATOCRIT 38.6 06/21/2022 08:58 AM    .6 (H) 06/21/2022 08:58 AM    MCH 34.0 (H) 06/21/2022 08:58 AM    MCHC 31.9 (L) 06/21/2022 08:58 AM    RDW 53.9 (H) 06/21/2022 08:58 AM    PLATELETCT 261 06/21/2022 08:58 AM    MPV 9.3 06/21/2022 08:58 AM      Lab Results   Component Value Date/Time    SODIUM 139 06/21/2022 08:58 AM    POTASSIUM 4.3 06/21/2022 08:58 AM    CHLORIDE 102 06/21/2022 08:58 AM    CO2 25 06/21/2022 08:58 AM    ANION 12.0 06/21/2022 08:58 AM    GLUCOSE 111 (H) 06/21/2022 08:58 AM    BUN 21 06/21/2022 08:58 AM    CREATININE 0.68 06/21/2022 08:58 AM    CALCIUM 9.7 06/21/2022 08:58 AM    ASTSGOT 25 " 06/21/2022 08:58 AM    ALTSGPT 23 06/21/2022 08:58 AM    TBILIRUBIN 0.4 06/21/2022 08:58 AM    ALBUMIN 4.1 06/21/2022 08:58 AM    TOTPROTEIN 7.1 06/21/2022 08:58 AM    GLOBULIN 3.0 06/21/2022 08:58 AM    AGRATIO 1.4 06/21/2022 08:58 AM     Lab Results   Component Value Date/Time    HBA1C 6.6 (H) 06/21/2022 08:58 AM      Lab Results   Component Value Date/Time    CHOLSTRLTOT 137 06/21/2022 0858    TRIGLYCERIDE 125 06/21/2022 0858    HDL 47 06/21/2022 0858    LDL 65 06/21/2022 0858       Assessment & Plan:     82 y.o. female with the following -     1. Dysuria  Acute condition, persistent. POC urine normal. Follow up UA and vaginal swab. Will treat as needed based on results.  - POCT Urinalysis  - VAGINAL PATHOGENS DNA PANEL; Future  - URINALYSIS,CULTURE IF INDICATED; Future    2. Fatigue, unspecified type  Chronic condition, plan to assess with labs per orders. B12 injection given today in clinic.  - cyanocobalamin (VITAMIN B-12) injection 1,000 mcg  - VITAMIN B12; Future  - FERRITIN; Future  - IRON/TOTAL IRON BIND; Future  - FOLATE; Future  - VITAMIN D,25 HYDROXY; Future  - TSH WITH REFLEX TO FT4; Future  - CBC WITH DIFFERENTIAL; Future    3. Hypodense mass of liver  Chronic condition, stable. Plan is to repeat ultrasound liver which has been ordered by GI. Phone number provided for patient to call to schedule for the ultrasound.    4. Type 2 diabetes mellitus with other circulatory complication, without long-term current use of insulin (HCC)  Chronic condition, controlled with medications. Most recent A1C 6.6.  - cont current medications: metformin 1000mg BID, Actos 15mg daily  - metformin (GLUCOPHAGE) 1000 MG tablet; metformin 1,000 mg tablet  Dispense: 180 Tablet; Refill: 3  - pioglitazone (ACTOS) 15 MG Tab; Take 1 Tablet by mouth every day.  Dispense: 90 Tablet; Refill: 3  - Comp Metabolic Panel; Future  - HEMOGLOBIN A1C; Future  - MICROALBUMIN CREAT RATIO URINE; Future    5. Primary hypertension  Chronic  condition, stable. Given her low normal blood pressure and fatigue, we will plan to trial discontinuing amlodipine and see if she feels better.  - cont current regimen: HCTZ 12.5mg daily, losartan 100mg daily, spironolactone 25mg BID  - discontinue amlodipine 5mg  - losartan (COZAAR) 100 MG Tab; Take 1 Tablet by mouth every day.  Dispense: 90 Tablet; Refill: 3  - hydrochlorothiazide (MICROZIDE) 12.5 MG capsule; Take 1 Capsule by mouth every day.  Dispense: 100 Capsule; Refill: 3  - spironolactone (ALDACTONE) 25 MG Tab; Take 1 Tablet by mouth 2 times a day.  Dispense: 180 Tablet; Refill: 3  - Comp Metabolic Panel; Future    6. Pure hypercholesterolemia  Chronic condition, stable.  - cont current regimen: atorvastatin 40mg qhs  - atorvastatin (LIPITOR) 40 MG Tab; Take 1 Tablet by mouth every evening.  Dispense: 100 Tablet; Refill: 3    7. Type 2 diabetes mellitus without complication, without long-term current use of insulin (HCC)  Chronic condition, controlled with medications. Most recent A1C 6.6.  - cont current medications: metformin 1000mg BID, Actos 15mg daily  - Discussed daily self foot exam, eye care, and regular exercise with patient  - Patient instructed to follow a low carbohydrate diet  - Follow up in 3 months or earlier as needed  - glucose blood strip; 1 Strip by Other route every day.  Dispense: 100 Strip; Refill: 3      Return in about 6 months (around 1/12/2023) for chronic medical conditions.    Please note that this dictation was created using voice recognition software. I have made every reasonable attempt to correct obvious errors, but I expect that there are errors of grammar and possibly content that I did not discover before finalizing the note.

## 2022-07-12 ENCOUNTER — HOSPITAL ENCOUNTER (OUTPATIENT)
Facility: MEDICAL CENTER | Age: 82
End: 2022-07-12
Attending: STUDENT IN AN ORGANIZED HEALTH CARE EDUCATION/TRAINING PROGRAM
Payer: MEDICARE

## 2022-07-12 ENCOUNTER — OFFICE VISIT (OUTPATIENT)
Dept: MEDICAL GROUP | Facility: MEDICAL CENTER | Age: 82
End: 2022-07-12
Payer: MEDICARE

## 2022-07-12 VITALS
OXYGEN SATURATION: 96 % | SYSTOLIC BLOOD PRESSURE: 104 MMHG | WEIGHT: 138.89 LBS | TEMPERATURE: 97.4 F | HEIGHT: 61 IN | BODY MASS INDEX: 26.22 KG/M2 | HEART RATE: 88 BPM | DIASTOLIC BLOOD PRESSURE: 62 MMHG | RESPIRATION RATE: 16 BRPM

## 2022-07-12 DIAGNOSIS — R30.0 DYSURIA: ICD-10-CM

## 2022-07-12 DIAGNOSIS — E11.9 TYPE 2 DIABETES MELLITUS WITHOUT COMPLICATION, WITHOUT LONG-TERM CURRENT USE OF INSULIN (HCC): ICD-10-CM

## 2022-07-12 DIAGNOSIS — E78.00 PURE HYPERCHOLESTEROLEMIA: ICD-10-CM

## 2022-07-12 DIAGNOSIS — R16.0 HYPODENSE MASS OF LIVER: ICD-10-CM

## 2022-07-12 DIAGNOSIS — E11.59 TYPE 2 DIABETES MELLITUS WITH OTHER CIRCULATORY COMPLICATION, WITHOUT LONG-TERM CURRENT USE OF INSULIN (HCC): ICD-10-CM

## 2022-07-12 DIAGNOSIS — R53.83 FATIGUE, UNSPECIFIED TYPE: ICD-10-CM

## 2022-07-12 DIAGNOSIS — I10 PRIMARY HYPERTENSION: ICD-10-CM

## 2022-07-12 LAB
APPEARANCE UR: CLEAR
APPEARANCE UR: CLEAR
BACTERIA #/AREA URNS HPF: NEGATIVE /HPF
BILIRUB UR QL STRIP.AUTO: NEGATIVE
BILIRUB UR STRIP-MCNC: NEGATIVE MG/DL
COLOR UR AUTO: YELLOW
COLOR UR: YELLOW
EPI CELLS #/AREA URNS HPF: NEGATIVE /HPF
GLUCOSE UR STRIP.AUTO-MCNC: NEGATIVE MG/DL
GLUCOSE UR STRIP.AUTO-MCNC: NEGATIVE MG/DL
HYALINE CASTS #/AREA URNS LPF: NORMAL /LPF
KETONES UR STRIP.AUTO-MCNC: NEGATIVE MG/DL
KETONES UR STRIP.AUTO-MCNC: NEGATIVE MG/DL
LEUKOCYTE ESTERASE UR QL STRIP.AUTO: ABNORMAL
LEUKOCYTE ESTERASE UR QL STRIP.AUTO: NEGATIVE
MICRO URNS: ABNORMAL
NITRITE UR QL STRIP.AUTO: NEGATIVE
NITRITE UR QL STRIP.AUTO: NEGATIVE
PH UR STRIP.AUTO: 6.5 [PH] (ref 5–8)
PH UR STRIP.AUTO: 7 [PH] (ref 5–8)
PROT UR QL STRIP: NEGATIVE MG/DL
PROT UR QL STRIP: NEGATIVE MG/DL
RBC # URNS HPF: NORMAL /HPF
RBC UR QL AUTO: NEGATIVE
RBC UR QL AUTO: NEGATIVE
SP GR UR STRIP.AUTO: 1.01
SP GR UR STRIP.AUTO: 1.01
UROBILINOGEN UR STRIP-MCNC: NORMAL MG/DL
UROBILINOGEN UR STRIP.AUTO-MCNC: 0.2 MG/DL
WBC #/AREA URNS HPF: NORMAL /HPF

## 2022-07-12 PROCEDURE — 87660 TRICHOMONAS VAGIN DIR PROBE: CPT

## 2022-07-12 PROCEDURE — 81001 URINALYSIS AUTO W/SCOPE: CPT

## 2022-07-12 PROCEDURE — 87480 CANDIDA DNA DIR PROBE: CPT

## 2022-07-12 PROCEDURE — 81002 URINALYSIS NONAUTO W/O SCOPE: CPT | Performed by: STUDENT IN AN ORGANIZED HEALTH CARE EDUCATION/TRAINING PROGRAM

## 2022-07-12 PROCEDURE — 99214 OFFICE O/P EST MOD 30 MIN: CPT | Performed by: STUDENT IN AN ORGANIZED HEALTH CARE EDUCATION/TRAINING PROGRAM

## 2022-07-12 PROCEDURE — 87510 GARDNER VAG DNA DIR PROBE: CPT

## 2022-07-12 RX ORDER — HYDROCHLOROTHIAZIDE 12.5 MG/1
12.5 CAPSULE, GELATIN COATED ORAL DAILY
Qty: 100 CAPSULE | Refills: 3 | Status: SHIPPED | OUTPATIENT
Start: 2022-07-12 | End: 2023-01-24 | Stop reason: SDUPTHER

## 2022-07-12 RX ORDER — PIOGLITAZONEHYDROCHLORIDE 15 MG/1
15 TABLET ORAL DAILY
Qty: 90 TABLET | Refills: 3 | Status: SHIPPED | OUTPATIENT
Start: 2022-07-12 | End: 2023-05-25

## 2022-07-12 RX ORDER — SPIRONOLACTONE 25 MG/1
25 TABLET ORAL 2 TIMES DAILY
Qty: 180 TABLET | Refills: 3 | Status: SHIPPED | OUTPATIENT
Start: 2022-07-12 | End: 2023-01-24 | Stop reason: SDUPTHER

## 2022-07-12 RX ORDER — CYANOCOBALAMIN 1000 UG/ML
1000 INJECTION, SOLUTION INTRAMUSCULAR; SUBCUTANEOUS ONCE
Status: COMPLETED | OUTPATIENT
Start: 2022-07-12 | End: 2022-07-12

## 2022-07-12 RX ORDER — ATORVASTATIN CALCIUM 40 MG/1
40 TABLET, FILM COATED ORAL NIGHTLY
Qty: 100 TABLET | Refills: 3 | Status: SHIPPED | OUTPATIENT
Start: 2022-07-12 | End: 2023-11-21

## 2022-07-12 RX ORDER — LOSARTAN POTASSIUM 100 MG/1
100 TABLET ORAL DAILY
Qty: 90 TABLET | Refills: 3 | Status: ON HOLD | OUTPATIENT
Start: 2022-07-12 | End: 2023-05-02

## 2022-07-12 RX ADMIN — CYANOCOBALAMIN 1000 MCG: 1000 INJECTION, SOLUTION INTRAMUSCULAR; SUBCUTANEOUS at 10:18

## 2022-07-12 ASSESSMENT — FIBROSIS 4 INDEX: FIB4 SCORE: 1.6

## 2022-07-13 LAB
CANDIDA DNA VAG QL PROBE+SIG AMP: NEGATIVE
G VAGINALIS DNA VAG QL PROBE+SIG AMP: NEGATIVE
T VAGINALIS DNA VAG QL PROBE+SIG AMP: NEGATIVE

## 2022-08-28 ENCOUNTER — HOSPITAL ENCOUNTER (OUTPATIENT)
Dept: RADIOLOGY | Facility: MEDICAL CENTER | Age: 82
End: 2022-08-28
Attending: NURSE PRACTITIONER
Payer: MEDICARE

## 2022-08-28 DIAGNOSIS — K76.89 HEPATIC CYST: ICD-10-CM

## 2022-08-28 PROCEDURE — 76700 US EXAM ABDOM COMPLETE: CPT

## 2022-10-03 ENCOUNTER — APPOINTMENT (OUTPATIENT)
Dept: RADIOLOGY | Facility: MEDICAL CENTER | Age: 82
End: 2022-10-03
Attending: NURSE PRACTITIONER
Payer: MEDICARE

## 2022-10-03 DIAGNOSIS — K76.89 LIVER CYST: ICD-10-CM

## 2022-10-03 PROCEDURE — 74183 MRI ABD W/O CNTR FLWD CNTR: CPT

## 2022-10-03 PROCEDURE — 700117 HCHG RX CONTRAST REV CODE 255: Performed by: NURSE PRACTITIONER

## 2022-10-03 PROCEDURE — A9576 INJ PROHANCE MULTIPACK: HCPCS | Performed by: NURSE PRACTITIONER

## 2022-10-03 RX ADMIN — GADOTERIDOL 15 ML: 279.3 INJECTION, SOLUTION INTRAVENOUS at 15:12

## 2022-11-08 ENCOUNTER — HOSPITAL ENCOUNTER (OUTPATIENT)
Dept: RADIOLOGY | Facility: MEDICAL CENTER | Age: 82
End: 2022-11-08
Attending: NURSE PRACTITIONER
Payer: MEDICARE

## 2022-11-08 DIAGNOSIS — K83.8 COMMON BILE DUCT DILATION: ICD-10-CM

## 2022-11-08 PROCEDURE — 74181 MRI ABDOMEN W/O CONTRAST: CPT

## 2022-12-09 ENCOUNTER — HOSPITAL ENCOUNTER (OUTPATIENT)
Dept: LAB | Facility: MEDICAL CENTER | Age: 82
End: 2022-12-09
Attending: STUDENT IN AN ORGANIZED HEALTH CARE EDUCATION/TRAINING PROGRAM
Payer: MEDICARE

## 2022-12-09 DIAGNOSIS — R53.83 FATIGUE, UNSPECIFIED TYPE: ICD-10-CM

## 2022-12-09 LAB
FERRITIN SERPL-MCNC: 83.9 NG/ML (ref 10–291)
FOLATE SERPL-MCNC: 25.8 NG/ML
IRON SATN MFR SERPL: 42 % (ref 15–55)
IRON SERPL-MCNC: 123 UG/DL (ref 40–170)
TIBC SERPL-MCNC: 291 UG/DL (ref 250–450)
UIBC SERPL-MCNC: 168 UG/DL (ref 110–370)

## 2022-12-09 PROCEDURE — 36415 COLL VENOUS BLD VENIPUNCTURE: CPT

## 2022-12-09 PROCEDURE — 82746 ASSAY OF FOLIC ACID SERUM: CPT

## 2022-12-09 PROCEDURE — 83550 IRON BINDING TEST: CPT

## 2022-12-09 PROCEDURE — 83540 ASSAY OF IRON: CPT

## 2022-12-09 PROCEDURE — 82728 ASSAY OF FERRITIN: CPT

## 2022-12-29 ENCOUNTER — HOSPITAL ENCOUNTER (OUTPATIENT)
Dept: LAB | Facility: MEDICAL CENTER | Age: 82
End: 2022-12-29
Attending: STUDENT IN AN ORGANIZED HEALTH CARE EDUCATION/TRAINING PROGRAM
Payer: MEDICARE

## 2022-12-29 LAB
ALT SERPL-CCNC: 22 U/L (ref 2–50)
AST SERPL-CCNC: 26 U/L (ref 12–45)
BASOPHILS # BLD AUTO: 0.7 % (ref 0–1.8)
BASOPHILS # BLD: 0.04 K/UL (ref 0–0.12)
CREAT SERPL-MCNC: 0.72 MG/DL (ref 0.5–1.4)
EOSINOPHIL # BLD AUTO: 0.12 K/UL (ref 0–0.51)
EOSINOPHIL NFR BLD: 2 % (ref 0–6.9)
ERYTHROCYTE [DISTWIDTH] IN BLOOD BY AUTOMATED COUNT: 51.2 FL (ref 35.9–50)
ERYTHROCYTE [SEDIMENTATION RATE] IN BLOOD BY WESTERGREN METHOD: 24 MM/HOUR (ref 0–25)
GFR SERPLBLD CREATININE-BSD FMLA CKD-EPI: 84 ML/MIN/1.73 M 2
HCT VFR BLD AUTO: 42 % (ref 37–47)
HGB BLD-MCNC: 13.7 G/DL (ref 12–16)
IMM GRANULOCYTES # BLD AUTO: 0.01 K/UL (ref 0–0.11)
IMM GRANULOCYTES NFR BLD AUTO: 0.2 % (ref 0–0.9)
LYMPHOCYTES # BLD AUTO: 1.28 K/UL (ref 1–4.8)
LYMPHOCYTES NFR BLD: 21.7 % (ref 22–41)
MCH RBC QN AUTO: 34.6 PG (ref 27–33)
MCHC RBC AUTO-ENTMCNC: 32.6 G/DL (ref 33.6–35)
MCV RBC AUTO: 106.1 FL (ref 81.4–97.8)
MONOCYTES # BLD AUTO: 0.64 K/UL (ref 0–0.85)
MONOCYTES NFR BLD AUTO: 10.8 % (ref 0–13.4)
NEUTROPHILS # BLD AUTO: 3.82 K/UL (ref 2–7.15)
NEUTROPHILS NFR BLD: 64.6 % (ref 44–72)
NRBC # BLD AUTO: 0 K/UL
NRBC BLD-RTO: 0 /100 WBC
PLATELET # BLD AUTO: 285 K/UL (ref 164–446)
PMV BLD AUTO: 9.2 FL (ref 9–12.9)
RBC # BLD AUTO: 3.96 M/UL (ref 4.2–5.4)
WBC # BLD AUTO: 5.9 K/UL (ref 4.8–10.8)

## 2022-12-29 PROCEDURE — 84450 TRANSFERASE (AST) (SGOT): CPT

## 2022-12-29 PROCEDURE — 36415 COLL VENOUS BLD VENIPUNCTURE: CPT

## 2022-12-29 PROCEDURE — 85025 COMPLETE CBC W/AUTO DIFF WBC: CPT

## 2022-12-29 PROCEDURE — 84460 ALANINE AMINO (ALT) (SGPT): CPT

## 2022-12-29 PROCEDURE — 85652 RBC SED RATE AUTOMATED: CPT

## 2022-12-29 PROCEDURE — 82565 ASSAY OF CREATININE: CPT

## 2023-01-06 ENCOUNTER — PRE-ADMISSION TESTING (OUTPATIENT)
Dept: ADMISSIONS | Facility: MEDICAL CENTER | Age: 83
DRG: 440 | End: 2023-01-06
Attending: INTERNAL MEDICINE
Payer: MEDICARE

## 2023-01-06 RX ORDER — MULTIVIT-MIN/IRON/FOLIC ACID/K 18-600-40
CAPSULE ORAL DAILY
COMMUNITY
End: 2023-01-18

## 2023-01-06 RX ORDER — ACETAMINOPHEN 325 MG/1
650 TABLET ORAL EVERY 4 HOURS PRN
COMMUNITY
End: 2023-01-18

## 2023-01-06 RX ORDER — AMLODIPINE BESYLATE 5 MG/1
TABLET ORAL EVERY EVENING
COMMUNITY
Start: 2022-12-20 | End: 2023-01-10 | Stop reason: SDUPTHER

## 2023-01-06 NOTE — PREPROCEDURE INSTRUCTIONS
"Preadmit appointment: \" Preparing for your Procedure information\" sheet reviewed with patient with verbal and written instructions- emailed. Patient instructed to continue prescribed medications through the day before surgery, instructed to take the following medications the day of surgery per anesthesia protocol: Omeprazole Dorzolamide-timolol  Verbal and written instructions on covid symptoms to watch for given to patient and patient instructed to call MD if any symptoms develop prior to surgery/procedure.  Pt denies issues with anesthesia.  METS >4.  Pt coming in for testing 1/10/2023  "

## 2023-01-09 NOTE — PROGRESS NOTES
Subjective:     CC: chronic conditions follow up    HPI:   Jewell presents today for chronic conditions follow up    Problem   Acute Maculopapular Rash    Acute condition. She has been having a rash to the right side of temple for the past few days which she scratched.     Bmi 26.0-26.9,Adult    Chronic condition. She tries to eat healthy in general. She does not regularly exercise.     Atherosclerosis of Aorta (Hcc)    Chronic condition.  Current regimen: atorvastatin 40mg qhs       Rheumatoid Arthritis Involving Multiple Joints (Hcc)    Chronic condition. Followed by rheum Dr. Acosta.  Current regimen: methotrexate 2.5mg weekly and folic acid 1mg daily  She reports compliance and/or tolerance and symptoms controlled with current medication(s). Does not need medication(s) refill. No acute concerns.       Hld (Hyperlipidemia)    Chronic condition.  Current regimen: atorvastatin 40mg daily.   She reports compliance and tolerating medication well. Denies musculoskeletal pain, headache, diarrhea. She does not need medication refill today. No acute concerns at this time.       Type 2 Diabetes Mellitus With Circulatory Disorder, Without Long-Term Current Use of Insulin (Hcc)    Chronic condition. Onset around around age 62.  Current medication regimen: metformin 1000mg BID, Actos 15mg daily  Patient tolerating and reports compliant with medications. Does not need refill of medications today. Denies vision changes, dry mouth, chest pain, nausea/vomiting/diarrhea, polydipsia, polyphagia, polyuria, hypoglycemia, numbness/tingling. She checks her feet at home.  Last eye exam: due, she has appointment next month, no DR from before  Last foot exam: due  Diet: tries to eat healthy in general  Exercise: stretching  Vaccines: flu 11/2021, PPSV23 completed 12/2008, Tdap received 04/2019       Hypertension    Chronic condition.  Current medication regimen: amlodipine 5mg daily, HCTZ 12.5mg daily, losartan 100mg daily, spironolactone  25mg BID  Patient tolerating and reports compliant with medications. She does not regularly monitor blood pressure at home. Does not need refill of medications today. Denies headache, dizziness, vision changes, chest pain, dyspnea, edema.           Current Outpatient Medications Ordered in Epic   Medication Sig Dispense Refill    mupirocin (BACTROBAN) 2 % Ointment Apply 1 Application topically 2 times a day. 22 g 0    amLODIPine (NORVASC) 5 MG Tab Take 1 Tablet by mouth every evening for 90 days. 90 Tablet 3    Ferrous Sulfate (IRON) 325 (65 Fe) MG Tab Take  by mouth every day.      Calcium-Vitamins C & D (CALCIUM/C/D PO) Take  by mouth every day.      Cholecalciferol (VITAMIN D) 50 MCG (2000 UT) Cap Take  by mouth every day.      acetaminophen (TYLENOL) 325 MG Tab Take 650 mg by mouth every four hours as needed.      diphenhydrAMINE-APAP, sleep, (TYLENOL PM EXTRA STRENGTH PO) Take  by mouth at bedtime.      metformin (GLUCOPHAGE) 1000 MG tablet metformin 1,000 mg tablet (Patient taking differently: 2 times a day with meals. metformin 1,000 mg tablet) 180 Tablet 3    pioglitazone (ACTOS) 15 MG Tab Take 1 Tablet by mouth every day. 90 Tablet 3    atorvastatin (LIPITOR) 40 MG Tab Take 1 Tablet by mouth every evening. 100 Tablet 3    losartan (COZAAR) 100 MG Tab Take 1 Tablet by mouth every day. (Patient taking differently: Take 100 mg by mouth every morning.) 90 Tablet 3    hydrochlorothiazide (MICROZIDE) 12.5 MG capsule Take 1 Capsule by mouth every day. (Patient taking differently: Take 12.5 mg by mouth every morning.) 100 Capsule 3    spironolactone (ALDACTONE) 25 MG Tab Take 1 Tablet by mouth 2 times a day. 180 Tablet 3    glucose blood strip 1 Strip by Other route every day. 100 Strip 3    Blood Glucose Monitoring Suppl (BLOOD GLUCOSE MONITOR SYSTEM) w/Device Kit BLOOD GLUCOSE MONITOR SYSTEM w/Device KIT      dorzolamide-timolol (COSOPT) 22.3-6.8 MG/ML Solution Administer  into both eyes 2 times a day.       "methotrexate 2.5 MG tablet Take 10 mg by mouth every 7 days. 4 tablets on Thursday      folic acid (FOLVITE) 1 MG Tab Take 1 mg by mouth every day.      multivitamin (THERAGRAN) TABS Take 1 Tab by mouth every day.      omeprazole (PRILOSEC) 20 MG CPDR Take 20 mg by mouth every morning.       No current Epic-ordered facility-administered medications on file.     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  Alcohol/tobacco/illicit drugs: denies  Baseline functional status: independent with ADLs     Health Maintenance: reviewed and discussed with patient  Vaccines: flu received 11/2021, Shingrix completed 11/2021, PPSV23 completed 12/2008, Tdap received 04/2019, Moderna COVID #3 received 11/2021  Pap smear 01/2021  Mammogram 11/2020 benign     ROS:   Gen: no fevers/chills, no changes in weight, + fatigue  Eyes: no changes in vision  Pulm: no sob, no cough  CV: no chest pain, no palpitations  GI: no nausea/vomiting, no diarrhea  : no dysuria  MSk: no myalgias  Skin: + rash  Neuro: no headaches    Objective:     Exam:  /66 (BP Location: Left arm, Patient Position: Sitting, BP Cuff Size: Adult)   Pulse 64   Temp 36.6 °C (97.8 °F) (Temporal)   Resp 16   Ht 1.549 m (5' 1\")   Wt 63 kg (138 lb 14.2 oz)   SpO2 93%   BMI 26.24 kg/m²  Body mass index is 26.24 kg/m².    General: Normal appearing. No distress.  Neck: Supple without JVD or bruit. Thyroid is not enlarged.  Pulmonary: Clear to ausculation. Normal effort. No rales, ronchi, or wheezing.  Cardiovascular: Regular rate and rhythm without murmur.  Abdomen: Soft, nontender, nondistended. Normal bowel sounds.  Neurologic: Grossly nonfocal  Skin: Warm and dry. + mildly erythematous papule to right temple noted.  Musculoskeletal: Normal gait. No extremity cyanosis, clubbing, or edema.  Psych: Normal mood and affect. Alert and oriented " x3. Judgment and insight is normal.    Monofilament testing with a 10 gram force: sensation intact: intact bilaterally  Visual Inspection: Feet without maceration, ulcers, fissures.  Pedal pulses: intact bilaterally    Labs:   Lab Results   Component Value Date/Time    WBC 5.9 12/29/2022 11:47 AM    RBC 3.96 (L) 12/29/2022 11:47 AM    HEMOGLOBIN 13.7 12/29/2022 11:47 AM    HEMATOCRIT 42.0 12/29/2022 11:47 AM    .1 (H) 12/29/2022 11:47 AM    MCH 34.6 (H) 12/29/2022 11:47 AM    MCHC 32.6 (L) 12/29/2022 11:47 AM    RDW 51.2 (H) 12/29/2022 11:47 AM    PLATELETCT 285 12/29/2022 11:47 AM    MPV 9.2 12/29/2022 11:47 AM      Lab Results   Component Value Date/Time    HBA1C 6.7 (A) 01/10/2023 08:41 AM        Assessment & Plan:     82 y.o. female with the following -     1. Type 2 diabetes mellitus with other circulatory complication, without long-term current use of insulin (HCC)  Chronic condition, controlled with medications. Most recent A1C 6.7.  - cont current medications: metformin 1000mg BID, Actos 15mg daily  - Discussed daily self foot exam, eye care, and regular exercise with patient  - Patient instructed to follow a low carbohydrate diet  - Follow up in 6 months or earlier as needed  - POCT Hemoglobin A1C  - MICROALBUMIN CREAT RATIO URINE; Future  - Diabetic Monofilament LE Exam  - POCT Retinal Eye Exam    2. Primary hypertension  Chronic condition, stable.  - cont current regimen: amlodipine 5mg daily, HCTZ 12.5mg daily, losartan 100mg daily, spironolactone 25mg BID  - amLODIPine (NORVASC) 5 MG Tab; Take 1 Tablet by mouth every evening for 90 days.  Dispense: 90 Tablet; Refill: 3    3. Pure hypercholesterolemia  Chronic condition, stable.  - cont current regimen: atorvastatin 40mg qhs    4. Acute maculopapular rash  Acute condition, stable. Rx Bactroban per order.  - mupirocin (BACTROBAN) 2 % Ointment; Apply 1 Application topically 2 times a day.  Dispense: 22 g; Refill: 0    5. Atherosclerosis of aorta  (HCC)  Chronic condition, stable.  - cont current regimen: atorvastatin 40mg qhs    6. Rheumatoid arthritis involving multiple joints (HCC)  Chronic condition, stable.  - cont current regimen: methotrexate 2.5mg weekly and folic acid 1mg daily  - cont management per rheumatology    7. BMI 26.0-26.9,adult  - Advised healthy diet/weight loss, regular exercise    Return in about 6 months (around 7/10/2023) for chronic medical conditions.    Please note that this dictation was created using voice recognition software. I have made every reasonable attempt to correct obvious errors, but I expect that there are errors of grammar and possibly content that I did not discover before finalizing the note.

## 2023-01-10 ENCOUNTER — HOSPITAL ENCOUNTER (OUTPATIENT)
Facility: MEDICAL CENTER | Age: 83
End: 2023-01-10
Attending: STUDENT IN AN ORGANIZED HEALTH CARE EDUCATION/TRAINING PROGRAM
Payer: MEDICARE

## 2023-01-10 ENCOUNTER — PRE-ADMISSION TESTING (OUTPATIENT)
Dept: ADMISSIONS | Facility: MEDICAL CENTER | Age: 83
DRG: 440 | End: 2023-01-10
Attending: INTERNAL MEDICINE
Payer: MEDICARE

## 2023-01-10 ENCOUNTER — OFFICE VISIT (OUTPATIENT)
Dept: MEDICAL GROUP | Facility: MEDICAL CENTER | Age: 83
End: 2023-01-10
Payer: MEDICARE

## 2023-01-10 VITALS
OXYGEN SATURATION: 93 % | WEIGHT: 138.89 LBS | RESPIRATION RATE: 16 BRPM | HEART RATE: 64 BPM | HEIGHT: 61 IN | DIASTOLIC BLOOD PRESSURE: 66 MMHG | BODY MASS INDEX: 26.22 KG/M2 | SYSTOLIC BLOOD PRESSURE: 112 MMHG | TEMPERATURE: 97.8 F

## 2023-01-10 DIAGNOSIS — E78.00 PURE HYPERCHOLESTEROLEMIA: ICD-10-CM

## 2023-01-10 DIAGNOSIS — I10 PRIMARY HYPERTENSION: ICD-10-CM

## 2023-01-10 DIAGNOSIS — R21 ACUTE MACULOPAPULAR RASH: ICD-10-CM

## 2023-01-10 DIAGNOSIS — E11.59 TYPE 2 DIABETES MELLITUS WITH OTHER CIRCULATORY COMPLICATION, WITHOUT LONG-TERM CURRENT USE OF INSULIN (HCC): ICD-10-CM

## 2023-01-10 DIAGNOSIS — I70.0 ATHEROSCLEROSIS OF AORTA (HCC): ICD-10-CM

## 2023-01-10 DIAGNOSIS — M06.9 RHEUMATOID ARTHRITIS INVOLVING MULTIPLE JOINTS (HCC): ICD-10-CM

## 2023-01-10 DIAGNOSIS — Z01.812 PRE-OPERATIVE LABORATORY EXAMINATION: ICD-10-CM

## 2023-01-10 DIAGNOSIS — Z01.810 PRE-OPERATIVE CARDIOVASCULAR EXAMINATION: ICD-10-CM

## 2023-01-10 LAB
ANION GAP SERPL CALC-SCNC: 12 MMOL/L (ref 7–16)
BUN SERPL-MCNC: 34 MG/DL (ref 8–22)
CALCIUM SERPL-MCNC: 9.9 MG/DL (ref 8.4–10.2)
CHLORIDE SERPL-SCNC: 104 MMOL/L (ref 96–112)
CO2 SERPL-SCNC: 23 MMOL/L (ref 20–33)
CREAT SERPL-MCNC: 0.87 MG/DL (ref 0.5–1.4)
CREAT UR-MCNC: 110.73 MG/DL
EKG IMPRESSION: NORMAL
EST. AVERAGE GLUCOSE BLD GHB EST-MCNC: 148 MG/DL
GFR SERPLBLD CREATININE-BSD FMLA CKD-EPI: 67 ML/MIN/1.73 M 2
GLUCOSE SERPL-MCNC: 139 MG/DL (ref 65–99)
HBA1C MFR BLD: 6.7 % (ref 0–5.6)
HBA1C MFR BLD: 6.8 % (ref 4–5.6)
INT CON NEG: NEGATIVE
INT CON POS: POSITIVE
MICROALBUMIN UR-MCNC: 1.4 MG/DL
MICROALBUMIN/CREAT UR: 13 MG/G (ref 0–30)
POTASSIUM SERPL-SCNC: 5 MMOL/L (ref 3.6–5.5)
SODIUM SERPL-SCNC: 139 MMOL/L (ref 135–145)

## 2023-01-10 PROCEDURE — 93005 ELECTROCARDIOGRAM TRACING: CPT

## 2023-01-10 PROCEDURE — 36415 COLL VENOUS BLD VENIPUNCTURE: CPT

## 2023-01-10 PROCEDURE — 82043 UR ALBUMIN QUANTITATIVE: CPT

## 2023-01-10 PROCEDURE — 80048 BASIC METABOLIC PNL TOTAL CA: CPT

## 2023-01-10 PROCEDURE — 99214 OFFICE O/P EST MOD 30 MIN: CPT | Performed by: STUDENT IN AN ORGANIZED HEALTH CARE EDUCATION/TRAINING PROGRAM

## 2023-01-10 PROCEDURE — 83036 HEMOGLOBIN GLYCOSYLATED A1C: CPT | Performed by: STUDENT IN AN ORGANIZED HEALTH CARE EDUCATION/TRAINING PROGRAM

## 2023-01-10 PROCEDURE — 82570 ASSAY OF URINE CREATININE: CPT

## 2023-01-10 PROCEDURE — 93010 ELECTROCARDIOGRAM REPORT: CPT | Performed by: INTERNAL MEDICINE

## 2023-01-10 PROCEDURE — 83036 HEMOGLOBIN GLYCOSYLATED A1C: CPT

## 2023-01-10 RX ORDER — AMLODIPINE BESYLATE 5 MG/1
5 TABLET ORAL EVERY EVENING
Qty: 90 TABLET | Refills: 3 | Status: SHIPPED | OUTPATIENT
Start: 2023-01-10 | End: 2023-04-10

## 2023-01-10 ASSESSMENT — PATIENT HEALTH QUESTIONNAIRE - PHQ9: CLINICAL INTERPRETATION OF PHQ2 SCORE: 0

## 2023-01-10 ASSESSMENT — FIBROSIS 4 INDEX: FIB4 SCORE: 1.56

## 2023-01-17 ENCOUNTER — HOSPITAL ENCOUNTER (OUTPATIENT)
Facility: MEDICAL CENTER | Age: 83
DRG: 440 | End: 2023-01-17
Attending: INTERNAL MEDICINE | Admitting: INTERNAL MEDICINE
Payer: MEDICARE

## 2023-01-17 ENCOUNTER — APPOINTMENT (OUTPATIENT)
Dept: RADIOLOGY | Facility: MEDICAL CENTER | Age: 83
DRG: 440 | End: 2023-01-17
Attending: INTERNAL MEDICINE
Payer: MEDICARE

## 2023-01-17 ENCOUNTER — APPOINTMENT (OUTPATIENT)
Dept: RADIOLOGY | Facility: MEDICAL CENTER | Age: 83
DRG: 440 | End: 2023-01-17
Attending: EMERGENCY MEDICINE
Payer: MEDICARE

## 2023-01-17 ENCOUNTER — ANESTHESIA (OUTPATIENT)
Dept: SURGERY | Facility: MEDICAL CENTER | Age: 83
DRG: 440 | End: 2023-01-17
Payer: MEDICARE

## 2023-01-17 ENCOUNTER — HOSPITAL ENCOUNTER (INPATIENT)
Facility: MEDICAL CENTER | Age: 83
LOS: 3 days | DRG: 440 | End: 2023-01-20
Attending: EMERGENCY MEDICINE | Admitting: HOSPITALIST
Payer: MEDICARE

## 2023-01-17 ENCOUNTER — ANESTHESIA EVENT (OUTPATIENT)
Dept: SURGERY | Facility: MEDICAL CENTER | Age: 83
DRG: 440 | End: 2023-01-17
Payer: MEDICARE

## 2023-01-17 VITALS
OXYGEN SATURATION: 91 % | BODY MASS INDEX: 25.81 KG/M2 | RESPIRATION RATE: 16 BRPM | DIASTOLIC BLOOD PRESSURE: 80 MMHG | HEART RATE: 78 BPM | WEIGHT: 136.69 LBS | HEIGHT: 61 IN | TEMPERATURE: 96.8 F | SYSTOLIC BLOOD PRESSURE: 149 MMHG

## 2023-01-17 DIAGNOSIS — K80.50 CHOLEDOCHOLITHIASIS: ICD-10-CM

## 2023-01-17 DIAGNOSIS — K85.80 OTHER ACUTE PANCREATITIS, UNSPECIFIED COMPLICATION STATUS: ICD-10-CM

## 2023-01-17 DIAGNOSIS — K85.10 ACUTE BILIARY PANCREATITIS, UNSPECIFIED COMPLICATION STATUS: ICD-10-CM

## 2023-01-17 DIAGNOSIS — G89.18 POST-OP PAIN: ICD-10-CM

## 2023-01-17 PROBLEM — K85.90 ACUTE PANCREATITIS: Status: ACTIVE | Noted: 2023-01-17

## 2023-01-17 LAB
ALBUMIN SERPL BCP-MCNC: 4.3 G/DL (ref 3.2–4.9)
ALBUMIN/GLOB SERPL: 1.4 G/DL
ALP SERPL-CCNC: 77 U/L (ref 30–99)
ALT SERPL-CCNC: 21 U/L (ref 2–50)
ANION GAP SERPL CALC-SCNC: 12 MMOL/L (ref 7–16)
APPEARANCE UR: CLEAR
AST SERPL-CCNC: 27 U/L (ref 12–45)
BACTERIA #/AREA URNS HPF: NEGATIVE /HPF
BASOPHILS # BLD AUTO: 0.2 % (ref 0–1.8)
BASOPHILS # BLD: 0.03 K/UL (ref 0–0.12)
BILIRUB SERPL-MCNC: 0.5 MG/DL (ref 0.1–1.5)
BILIRUB UR QL STRIP.AUTO: NEGATIVE
BUN SERPL-MCNC: 18 MG/DL (ref 8–22)
CALCIUM ALBUM COR SERPL-MCNC: 9.4 MG/DL (ref 8.5–10.5)
CALCIUM SERPL-MCNC: 9.6 MG/DL (ref 8.4–10.2)
CHLORIDE SERPL-SCNC: 105 MMOL/L (ref 96–112)
CO2 SERPL-SCNC: 22 MMOL/L (ref 20–33)
COLOR UR: YELLOW
CREAT SERPL-MCNC: 0.65 MG/DL (ref 0.5–1.4)
EKG IMPRESSION: NORMAL
EOSINOPHIL # BLD AUTO: 0.09 K/UL (ref 0–0.51)
EOSINOPHIL NFR BLD: 0.7 % (ref 0–6.9)
EPI CELLS #/AREA URNS HPF: NEGATIVE /HPF
ERYTHROCYTE [DISTWIDTH] IN BLOOD BY AUTOMATED COUNT: 49.1 FL (ref 35.9–50)
GFR SERPLBLD CREATININE-BSD FMLA CKD-EPI: 89 ML/MIN/1.73 M 2
GLOBULIN SER CALC-MCNC: 3 G/DL (ref 1.9–3.5)
GLUCOSE BLD STRIP.AUTO-MCNC: 124 MG/DL (ref 65–99)
GLUCOSE BLD STRIP.AUTO-MCNC: 143 MG/DL (ref 65–99)
GLUCOSE SERPL-MCNC: 103 MG/DL (ref 65–99)
GLUCOSE UR STRIP.AUTO-MCNC: NEGATIVE MG/DL
HCT VFR BLD AUTO: 41.1 % (ref 37–47)
HGB BLD-MCNC: 13.6 G/DL (ref 12–16)
IMM GRANULOCYTES # BLD AUTO: 0.04 K/UL (ref 0–0.11)
IMM GRANULOCYTES NFR BLD AUTO: 0.3 % (ref 0–0.9)
KETONES UR STRIP.AUTO-MCNC: NEGATIVE MG/DL
LEUKOCYTE ESTERASE UR QL STRIP.AUTO: ABNORMAL
LIPASE SERPL-CCNC: >3000 U/L (ref 7–58)
LYMPHOCYTES # BLD AUTO: 1.16 K/UL (ref 1–4.8)
LYMPHOCYTES NFR BLD: 9.5 % (ref 22–41)
MCH RBC QN AUTO: 33.9 PG (ref 27–33)
MCHC RBC AUTO-ENTMCNC: 33.1 G/DL (ref 33.6–35)
MCV RBC AUTO: 102.5 FL (ref 81.4–97.8)
MICRO URNS: ABNORMAL
MONOCYTES # BLD AUTO: 1.29 K/UL (ref 0–0.85)
MONOCYTES NFR BLD AUTO: 10.6 % (ref 0–13.4)
NEUTROPHILS # BLD AUTO: 9.55 K/UL (ref 2–7.15)
NEUTROPHILS NFR BLD: 78.7 % (ref 44–72)
NITRITE UR QL STRIP.AUTO: NEGATIVE
NRBC # BLD AUTO: 0 K/UL
NRBC BLD-RTO: 0 /100 WBC
PH UR STRIP.AUTO: 5.5 [PH] (ref 5–8)
PLATELET # BLD AUTO: 303 K/UL (ref 164–446)
PMV BLD AUTO: 9.6 FL (ref 9–12.9)
POTASSIUM SERPL-SCNC: 3.6 MMOL/L (ref 3.6–5.5)
PROT SERPL-MCNC: 7.3 G/DL (ref 6–8.2)
PROT UR QL STRIP: NEGATIVE MG/DL
RBC # BLD AUTO: 4.01 M/UL (ref 4.2–5.4)
RBC # URNS HPF: ABNORMAL /HPF
RBC UR QL AUTO: NEGATIVE
SODIUM SERPL-SCNC: 139 MMOL/L (ref 135–145)
SP GR UR STRIP.AUTO: <=1.005
WBC # BLD AUTO: 12.2 K/UL (ref 4.8–10.8)
WBC #/AREA URNS HPF: ABNORMAL /HPF

## 2023-01-17 PROCEDURE — C1769 GUIDE WIRE: HCPCS | Performed by: INTERNAL MEDICINE

## 2023-01-17 PROCEDURE — 160009 HCHG ANES TIME/MIN: Performed by: INTERNAL MEDICINE

## 2023-01-17 PROCEDURE — 700105 HCHG RX REV CODE 258: Performed by: INTERNAL MEDICINE

## 2023-01-17 PROCEDURE — 700111 HCHG RX REV CODE 636 W/ 250 OVERRIDE (IP): Performed by: ANESTHESIOLOGY

## 2023-01-17 PROCEDURE — 81001 URINALYSIS AUTO W/SCOPE: CPT

## 2023-01-17 PROCEDURE — 83690 ASSAY OF LIPASE: CPT

## 2023-01-17 PROCEDURE — 85025 COMPLETE CBC W/AUTO DIFF WBC: CPT

## 2023-01-17 PROCEDURE — 00732 ANES UPR GI NDSC PX ERCP: CPT | Performed by: ANESTHESIOLOGY

## 2023-01-17 PROCEDURE — 160048 HCHG OR STATISTICAL LEVEL 1-5: Performed by: INTERNAL MEDICINE

## 2023-01-17 PROCEDURE — 96375 TX/PRO/DX INJ NEW DRUG ADDON: CPT

## 2023-01-17 PROCEDURE — 99100 ANES PT EXTEME AGE<1 YR&>70: CPT | Performed by: ANESTHESIOLOGY

## 2023-01-17 PROCEDURE — 96376 TX/PRO/DX INJ SAME DRUG ADON: CPT

## 2023-01-17 PROCEDURE — 74177 CT ABD & PELVIS W/CONTRAST: CPT

## 2023-01-17 PROCEDURE — 700101 HCHG RX REV CODE 250: Performed by: INTERNAL MEDICINE

## 2023-01-17 PROCEDURE — 700101 HCHG RX REV CODE 250: Performed by: ANESTHESIOLOGY

## 2023-01-17 PROCEDURE — 160046 HCHG PACU - 1ST 60 MINS PHASE II: Performed by: INTERNAL MEDICINE

## 2023-01-17 PROCEDURE — 160208 HCHG ENDO MINUTES - EA ADDL 1 MIN LEVEL 4: Performed by: INTERNAL MEDICINE

## 2023-01-17 PROCEDURE — 700111 HCHG RX REV CODE 636 W/ 250 OVERRIDE (IP): Performed by: EMERGENCY MEDICINE

## 2023-01-17 PROCEDURE — 770001 HCHG ROOM/CARE - MED/SURG/GYN PRIV*

## 2023-01-17 PROCEDURE — 82962 GLUCOSE BLOOD TEST: CPT

## 2023-01-17 PROCEDURE — 36415 COLL VENOUS BLD VENIPUNCTURE: CPT

## 2023-01-17 PROCEDURE — 99285 EMERGENCY DEPT VISIT HI MDM: CPT

## 2023-01-17 PROCEDURE — C1889 IMPLANT/INSERT DEVICE, NOC: HCPCS | Performed by: INTERNAL MEDICINE

## 2023-01-17 PROCEDURE — 93005 ELECTROCARDIOGRAM TRACING: CPT | Performed by: EMERGENCY MEDICINE

## 2023-01-17 PROCEDURE — 700117 HCHG RX CONTRAST REV CODE 255: Performed by: EMERGENCY MEDICINE

## 2023-01-17 PROCEDURE — 160025 RECOVERY II MINUTES (STATS): Performed by: INTERNAL MEDICINE

## 2023-01-17 PROCEDURE — 80053 COMPREHEN METABOLIC PANEL: CPT

## 2023-01-17 PROCEDURE — 160002 HCHG RECOVERY MINUTES (STAT): Performed by: INTERNAL MEDICINE

## 2023-01-17 PROCEDURE — 160035 HCHG PACU - 1ST 60 MINS PHASE I: Performed by: INTERNAL MEDICINE

## 2023-01-17 PROCEDURE — 700105 HCHG RX REV CODE 258: Performed by: HOSPITALIST

## 2023-01-17 PROCEDURE — 96374 THER/PROPH/DIAG INJ IV PUSH: CPT

## 2023-01-17 PROCEDURE — 82962 GLUCOSE BLOOD TEST: CPT | Mod: 91

## 2023-01-17 PROCEDURE — 99223 1ST HOSP IP/OBS HIGH 75: CPT | Performed by: HOSPITALIST

## 2023-01-17 PROCEDURE — 160203 HCHG ENDO MINUTES - 1ST 30 MINS LEVEL 4: Performed by: INTERNAL MEDICINE

## 2023-01-17 RX ORDER — MORPHINE SULFATE 4 MG/ML
2 INJECTION INTRAVENOUS
Status: DISCONTINUED | OUTPATIENT
Start: 2023-01-17 | End: 2023-01-20 | Stop reason: HOSPADM

## 2023-01-17 RX ORDER — ONDANSETRON 4 MG/1
4 TABLET, ORALLY DISINTEGRATING ORAL EVERY 4 HOURS PRN
Status: DISCONTINUED | OUTPATIENT
Start: 2023-01-17 | End: 2023-01-20 | Stop reason: HOSPADM

## 2023-01-17 RX ORDER — ACETAMINOPHEN 325 MG/1
650 TABLET ORAL EVERY 6 HOURS PRN
Status: DISCONTINUED | OUTPATIENT
Start: 2023-01-17 | End: 2023-01-20 | Stop reason: HOSPADM

## 2023-01-17 RX ORDER — OXYCODONE HCL 5 MG/5 ML
5 SOLUTION, ORAL ORAL
Status: DISCONTINUED | OUTPATIENT
Start: 2023-01-17 | End: 2023-01-17 | Stop reason: HOSPADM

## 2023-01-17 RX ORDER — HYDROMORPHONE HYDROCHLORIDE 1 MG/ML
0.5 INJECTION, SOLUTION INTRAMUSCULAR; INTRAVENOUS; SUBCUTANEOUS ONCE
Status: COMPLETED | OUTPATIENT
Start: 2023-01-17 | End: 2023-01-17

## 2023-01-17 RX ORDER — DORZOLAMIDE HYDROCHLORIDE AND TIMOLOL MALEATE 20; 5 MG/ML; MG/ML
1 SOLUTION/ DROPS OPHTHALMIC 2 TIMES DAILY
Status: DISCONTINUED | OUTPATIENT
Start: 2023-01-17 | End: 2023-01-20 | Stop reason: HOSPADM

## 2023-01-17 RX ORDER — SODIUM CHLORIDE, SODIUM LACTATE, POTASSIUM CHLORIDE, CALCIUM CHLORIDE 600; 310; 30; 20 MG/100ML; MG/100ML; MG/100ML; MG/100ML
INJECTION, SOLUTION INTRAVENOUS CONTINUOUS
Status: ACTIVE | OUTPATIENT
Start: 2023-01-17 | End: 2023-01-17

## 2023-01-17 RX ORDER — SODIUM CHLORIDE 9 MG/ML
1000 INJECTION, SOLUTION INTRAVENOUS ONCE
Status: COMPLETED | OUTPATIENT
Start: 2023-01-17 | End: 2023-01-18

## 2023-01-17 RX ORDER — MEPERIDINE HYDROCHLORIDE 25 MG/ML
6.25 INJECTION INTRAMUSCULAR; INTRAVENOUS; SUBCUTANEOUS
Status: DISCONTINUED | OUTPATIENT
Start: 2023-01-17 | End: 2023-01-17 | Stop reason: HOSPADM

## 2023-01-17 RX ORDER — BISACODYL 10 MG
10 SUPPOSITORY, RECTAL RECTAL
Status: DISCONTINUED | OUTPATIENT
Start: 2023-01-17 | End: 2023-01-20 | Stop reason: HOSPADM

## 2023-01-17 RX ORDER — SODIUM CHLORIDE, SODIUM LACTATE, POTASSIUM CHLORIDE, CALCIUM CHLORIDE 600; 310; 30; 20 MG/100ML; MG/100ML; MG/100ML; MG/100ML
INJECTION, SOLUTION INTRAVENOUS CONTINUOUS
Status: DISCONTINUED | OUTPATIENT
Start: 2023-01-17 | End: 2023-01-17 | Stop reason: HOSPADM

## 2023-01-17 RX ORDER — OXYCODONE HCL 5 MG/5 ML
10 SOLUTION, ORAL ORAL
Status: DISCONTINUED | OUTPATIENT
Start: 2023-01-17 | End: 2023-01-17 | Stop reason: HOSPADM

## 2023-01-17 RX ORDER — ONDANSETRON 2 MG/ML
4 INJECTION INTRAMUSCULAR; INTRAVENOUS EVERY 4 HOURS PRN
Status: DISCONTINUED | OUTPATIENT
Start: 2023-01-17 | End: 2023-01-20 | Stop reason: HOSPADM

## 2023-01-17 RX ORDER — LIDOCAINE HYDROCHLORIDE 20 MG/ML
INJECTION, SOLUTION EPIDURAL; INFILTRATION; INTRACAUDAL; PERINEURAL PRN
Status: DISCONTINUED | OUTPATIENT
Start: 2023-01-17 | End: 2023-01-17 | Stop reason: SURG

## 2023-01-17 RX ORDER — ONDANSETRON 2 MG/ML
4 INJECTION INTRAMUSCULAR; INTRAVENOUS ONCE
Status: COMPLETED | OUTPATIENT
Start: 2023-01-17 | End: 2023-01-17

## 2023-01-17 RX ORDER — HYDROMORPHONE HYDROCHLORIDE 1 MG/ML
0.5 INJECTION, SOLUTION INTRAMUSCULAR; INTRAVENOUS; SUBCUTANEOUS
Status: DISCONTINUED | OUTPATIENT
Start: 2023-01-17 | End: 2023-01-20 | Stop reason: HOSPADM

## 2023-01-17 RX ORDER — ONDANSETRON 2 MG/ML
4 INJECTION INTRAMUSCULAR; INTRAVENOUS
Status: DISCONTINUED | OUTPATIENT
Start: 2023-01-17 | End: 2023-01-17 | Stop reason: HOSPADM

## 2023-01-17 RX ORDER — POLYETHYLENE GLYCOL 3350 17 G/17G
1 POWDER, FOR SOLUTION ORAL
Status: DISCONTINUED | OUTPATIENT
Start: 2023-01-17 | End: 2023-01-20 | Stop reason: HOSPADM

## 2023-01-17 RX ORDER — AMOXICILLIN 250 MG
2 CAPSULE ORAL 2 TIMES DAILY
Status: DISCONTINUED | OUTPATIENT
Start: 2023-01-17 | End: 2023-01-20 | Stop reason: HOSPADM

## 2023-01-17 RX ORDER — LABETALOL HYDROCHLORIDE 5 MG/ML
10 INJECTION, SOLUTION INTRAVENOUS EVERY 4 HOURS PRN
Status: DISCONTINUED | OUTPATIENT
Start: 2023-01-17 | End: 2023-01-20 | Stop reason: HOSPADM

## 2023-01-17 RX ORDER — HALOPERIDOL 5 MG/ML
1 INJECTION INTRAMUSCULAR
Status: DISCONTINUED | OUTPATIENT
Start: 2023-01-17 | End: 2023-01-17 | Stop reason: HOSPADM

## 2023-01-17 RX ORDER — DIPHENHYDRAMINE HYDROCHLORIDE 50 MG/ML
12.5 INJECTION INTRAMUSCULAR; INTRAVENOUS
Status: DISCONTINUED | OUTPATIENT
Start: 2023-01-17 | End: 2023-01-17 | Stop reason: HOSPADM

## 2023-01-17 RX ADMIN — HYDROMORPHONE HYDROCHLORIDE 0.5 MG: 1 INJECTION, SOLUTION INTRAMUSCULAR; INTRAVENOUS; SUBCUTANEOUS at 20:11

## 2023-01-17 RX ADMIN — SODIUM CHLORIDE, POTASSIUM CHLORIDE, SODIUM LACTATE AND CALCIUM CHLORIDE: 600; 310; 30; 20 INJECTION, SOLUTION INTRAVENOUS at 07:17

## 2023-01-17 RX ADMIN — IOHEXOL 100 ML: 350 INJECTION, SOLUTION INTRAVENOUS at 21:45

## 2023-01-17 RX ADMIN — PROPOFOL 200 MG: 10 INJECTION, EMULSION INTRAVENOUS at 09:05

## 2023-01-17 RX ADMIN — Medication 100 MG: at 09:05

## 2023-01-17 RX ADMIN — SODIUM CHLORIDE 1000 ML: 9 INJECTION, SOLUTION INTRAVENOUS at 23:30

## 2023-01-17 RX ADMIN — HYDROMORPHONE HYDROCHLORIDE 0.5 MG: 1 INJECTION, SOLUTION INTRAMUSCULAR; INTRAVENOUS; SUBCUTANEOUS at 22:23

## 2023-01-17 RX ADMIN — LIDOCAINE HYDROCHLORIDE 100 MG: 20 INJECTION, SOLUTION EPIDURAL; INFILTRATION; INTRACAUDAL at 09:05

## 2023-01-17 RX ADMIN — ONDANSETRON 4 MG: 2 INJECTION INTRAMUSCULAR; INTRAVENOUS at 20:11

## 2023-01-17 RX ADMIN — LIDOCAINE HYDROCHLORIDE 0.5 ML: 10 INJECTION, SOLUTION EPIDURAL; INFILTRATION; INTRACAUDAL; PERINEURAL at 07:17

## 2023-01-17 RX ADMIN — GLUCAGON 0.5 MG: 1 INJECTION, POWDER, LYOPHILIZED, FOR SOLUTION INTRAMUSCULAR; INTRAVENOUS at 09:36

## 2023-01-17 ASSESSMENT — FIBROSIS 4 INDEX
FIB4 SCORE: 1.56
FIB4 SCORE: 1.56

## 2023-01-17 NOTE — OR NURSING
1056: Pt to stage 2 from PACU. Dressed w/ help of CNA. Denies pain or nausea. Had small bruise under her L eye about the size of a judith, and small amount of blood above L upper lip.     1105: Discharge instructions provided to pt and family, all questions answered.     1120: ambulated to restroom, able to void x 1 w/o issue    1126: PIV removed, tip intact. Discharged to care of family after uneventful PACU stay via wheelchair by CNA.

## 2023-01-17 NOTE — OP REPORT
DATE OF SERVICE:  01/17/23     PROCEDURE PERFORMED:  Attempted Endoscopic retrograde cholangiography     CONSENT:  Procedure risks and benefits reviewed thoroughly with the patient, risks including but not limited to bleeding, perforation, side effects of medication were informed.  Patient voiced understanding and agreed to proceed.  Additional risks inherent to ERCP that being mild, moderate, severe pancreatitis that could lead to postprocedural pain, prolonged hospitalization, intensive care unit stay, and/or death were reviewed with the   patient who voiced understanding and agreed to proceed.     PREPROCEDURE DIAGNOSIS:  choledocholithiasis     POSTPROCEDURE DIAGNOSES:  choledocholithiasis     PHYSICIAN: Milton Walker MD        DESCRIPTION OF PROCEDURE:  The patient was placed in a prone position after intubation and sedation.  A bite block was inserted in the mouth and a side-viewing duodenoscope was passed carefully and easily under semi-direct visualization into the esophagus and passed through the stomach to the duodenum. Upon reaching the second portion of the duodenum, the scope was withdrawn to the short-position.The ampulla was identified. The ampulla was located on the side wall of a diverticulum.    Using a [employer] Rx44 sphincterotome preloaded with a 0.035 wire, CBD cannulation. Multiple attempts were made to obtain access to the biliary orifice but due to the location of the papilla, these attempts were unsuccessful. The wire did pass into the PD on one occasion but was quickly removed and there was spontaneously flow of pancreatic fluid. No contrast injection was made during this procedure.        COMPLICATIONS:  None.     BLOOD LOSS:  None.     SPECIMENS:  None.    SUMMARY:  1.) Unsuccessful ERCP due to location of the biliary orifice  2.) Difficult airway intubation requiring glidescope       RECOMMENDATIONS:   1.) ok to discharge today  2.) will reschedule patient for repeated attempt  at ERCP and stone removal  3.) restart regular diet

## 2023-01-17 NOTE — OR NURSING
1012 : To PACU from [facility] via [facility], report received from anesthesia and RN. Respirations are spontaneous and unlabored. VSS on 6L. Pt asleep and appears comfortable.     1015: MD at bedside. Pt continues to be asleep, not roused at this time.     1030: Pt awake, denies pain and nausea. Pt  updated.     1045: No changes. Meets criteria to transfer to Stage 2.     1055: Report given to SIMA Abarca.

## 2023-01-17 NOTE — DISCHARGE INSTRUCTIONS
RECOMMENDATIONS From MD Walker:  1.) okay to discharge today  2.) will reschedule patient for repeated attempt at ERCP and stone removal  3.) restart regular diet     ENDOSCOPY HOME CARE INSTRUCTIONS    GASTROSCOPY OR ERCP  1. Don't eat or drink anything for about an hour after the test. You can then resume your regular diet.  2. Don't drive or drink alcohol for 24 hours. The medication you received will make you too drowsy.  3. Don't take any coffee, tea, or aspirin products until after you see your doctor. These can harm the lining of your stomach.  4. If you begin to vomit bloody material, or develop black or bloody stools, call your doctor as soon as possible.  5. If you have any neck, chest, abdominal pain or temp of 100 degrees, call your doctor.  6. See your doctor call to schedule  7. Additional instructions:   8. Prescriptions:     You should call 911 if you develop problems with breathing or chest pain.  If any questions arise, call your doctor. If your doctor is not available, please feel free to call [phone]. You can also call the [facility] open 24 hours/day, 7 days/week and speak to a nurse at [phone], or toll free [phone].    What to Expect Post Anesthesia    Rest and take it easy for the first 24 hours.  A responsible adult is recommended to remain with you during that time.  It is normal to feel sleepy.  We encourage you to not do anything that requires balance, judgment or coordination.    FOR 24 HOURS DO NOT:  Drive, operate machinery or run household appliances.  Drink beer or alcoholic beverages.  Make important decisions or sign legal documents.    To avoid nausea, slowly advance diet as tolerated, avoiding spicy or greasy foods for the first day.  Add more substantial food to your diet according to your provider's instructions.  Babies can be fed formula or breast milk as soon as they are hungry.  INCREASE FLUIDS AND FIBER TO AVOID CONSTIPATION.    MILD FLU-LIKE  SYMPTOMS ARE NORMAL.  YOU MAY EXPERIENCE GENERALIZED MUSCLE ACHES, THROAT IRRITATION, HEADACHE AND/OR SOME NAUSEA.    If any questions arise, call your provider.  If your provider is not available, please feel free to call the [facility] at [phone].    MEDICATIONS: Resume taking daily medication.  Take prescribed pain medication with food.  If no medication is prescribed, you may take non-aspirin pain medication if needed.  PAIN MEDICATION CAN BE VERY CONSTIPATING.  Take a stool softener or laxative such as senokot, pericolace, or milk of magnesia if needed.    No pain medicated given in PACU.

## 2023-01-17 NOTE — ANESTHESIA POSTPROCEDURE EVALUATION
Patient: Jewell Roper Emily    Procedure Summary     Date: 01/17/23 Room / Location:  ENDOSCOPIC ULTRASOUND ROOM / SURGERY [facility]    Anesthesia Start: 0852 Anesthesia Stop: 1029    Procedure: ENDOSCOPIC RETROGRADE CHOLANGIOPANCREATOGRAPHY Diagnosis:       Choledocholithiasis      (Choledocholithiasis [281787])    Surgeons: Milton Walker M.D. Responsible Provider: Harpreet Aquino M.D.    Anesthesia Type: general ASA Status: 2          Final Anesthesia Type: general  Last vitals  BP   Blood Pressure : (!) 153/60    Temp   36.1 °C (97 °F)    Pulse   69   Resp   14    SpO2   100 %      Anesthesia Post Evaluation    Patient location during evaluation: PACU  Patient participation: complete - patient participated  Level of consciousness: awake and alert    Airway patency: patent  Anesthetic complications: no  Cardiovascular status: hemodynamically stable  Respiratory status: acceptable  Hydration status: euvolemic    PONV: none          There were no known notable events for this encounter.     Nurse Pain Score: 0 (NPRS)

## 2023-01-17 NOTE — ANESTHESIA TIME REPORT
Anesthesia Start and Stop Event Times     Date Time Event    1/17/2023 0845 Ready for Procedure     0852 Anesthesia Start     1029 Anesthesia Stop        Responsible Staff  01/17/23    Name Role Begin End    Harpreet Aquino M.D. Anesth 0852 1029        Overtime Reason:  no overtime (within assigned shift)    Comments:

## 2023-01-17 NOTE — ANESTHESIA PREPROCEDURE EVALUATION
Case: [id number] Date/Time: 01/17/23 0815    Procedure: ENDOSCOPIC RETROGRADE CHOLANGIOPANCREATOGRAPHY    Anesthesia type: General    Location:  ENDOSCOPIC ULTRASOUND ROOM / SURGERY [facility]    Surgeons: Milton Walker M.D.          Relevant Problems   CARDIAC   (positive) Atherosclerosis of aorta (HCC)   (positive) Hypertension      ENDO   (positive) Type 2 diabetes mellitus with circulatory disorder, without long-term current use of insulin (HCC)      Other   (positive) Rheumatoid arthritis involving multiple joints (HCC)       Physical Exam    Airway   Mallampati: II  TM distance: >3 FB  Neck ROM: full       Cardiovascular - normal exam  Rhythm: regular  Rate: normal  (-) murmur     Dental - normal exam           Pulmonary - normal exam  Breath sounds clear to auscultation     Abdominal    Neurological - normal exam                 Anesthesia Plan    ASA 2       Plan - general       Airway plan will be ETT          Induction: intravenous    Postoperative Plan: Postoperative administration of opioids is intended.    Pertinent diagnostic labs and testing reviewed    Informed Consent:    Anesthetic plan and risks discussed with patient.    Use of blood products discussed with: patient whom consented to blood products.

## 2023-01-17 NOTE — OR NURSING
8344   Pt allergies and NPO status verified.  Home medications reconciled.  Belongings secured.  Pt verbalizes understanding of pain scale, expected course of stay, and plan of care.  Surgical site verified with pt.  IV access established.  All questions answered.  Bed in low position.  Call light in reach.

## 2023-01-17 NOTE — ANESTHESIA PROCEDURE NOTES
Airway    Date/Time: 1/17/2023 9:03 AM  Performed by: Harpreet Aquino M.D.  Authorized by: Harpreet Aquino M.D.     Location:  OR  Urgency:  Elective  Indications for Airway Management:  Anesthesia      Spontaneous Ventilation: absent    Sedation Level:  Deep  Preoxygenated: Yes    Patient Position:  Sniffing  Final Airway Type:  Endotracheal airway  Final Endotracheal Airway:  ETT  Cuffed: Yes    Technique Used for Successful ETT Placement:  Video laryngoscopy    Insertion Site:  Oral  Blade Type:  Shar  Laryngoscope Blade/Videolaryngoscope Blade Size:  3  ETT Size (mm):  7.0  Measured from:  Teeth  ETT to Teeth (cm):  22  Placement Verified by: auscultation and capnometry    Cormack-Lehane Classification:  Grade I - full view of glottis  Number of Attempts at Approach:  1

## 2023-01-18 LAB
ALBUMIN SERPL BCP-MCNC: 3.7 G/DL (ref 3.2–4.9)
ALBUMIN/GLOB SERPL: 1.4 G/DL
ALP SERPL-CCNC: 67 U/L (ref 30–99)
ALT SERPL-CCNC: 17 U/L (ref 2–50)
ANION GAP SERPL CALC-SCNC: 9 MMOL/L (ref 7–16)
AST SERPL-CCNC: 22 U/L (ref 12–45)
BILIRUB SERPL-MCNC: 0.5 MG/DL (ref 0.1–1.5)
BUN SERPL-MCNC: 16 MG/DL (ref 8–22)
CALCIUM ALBUM COR SERPL-MCNC: 9.1 MG/DL (ref 8.5–10.5)
CALCIUM SERPL-MCNC: 8.9 MG/DL (ref 8.4–10.2)
CHLORIDE SERPL-SCNC: 106 MMOL/L (ref 96–112)
CO2 SERPL-SCNC: 24 MMOL/L (ref 20–33)
CREAT SERPL-MCNC: 0.62 MG/DL (ref 0.5–1.4)
ERYTHROCYTE [DISTWIDTH] IN BLOOD BY AUTOMATED COUNT: 49.1 FL (ref 35.9–50)
GFR SERPLBLD CREATININE-BSD FMLA CKD-EPI: 90 ML/MIN/1.73 M 2
GLOBULIN SER CALC-MCNC: 2.6 G/DL (ref 1.9–3.5)
GLUCOSE BLD STRIP.AUTO-MCNC: 106 MG/DL (ref 65–99)
GLUCOSE BLD STRIP.AUTO-MCNC: 107 MG/DL (ref 65–99)
GLUCOSE BLD STRIP.AUTO-MCNC: 120 MG/DL (ref 65–99)
GLUCOSE BLD STRIP.AUTO-MCNC: 98 MG/DL (ref 65–99)
GLUCOSE SERPL-MCNC: 138 MG/DL (ref 65–99)
HCT VFR BLD AUTO: 37.5 % (ref 37–47)
HGB BLD-MCNC: 12.5 G/DL (ref 12–16)
LIPASE SERPL-CCNC: >3000 U/L (ref 7–58)
LIPASE SERPL-CCNC: >3000 U/L (ref 7–58)
MCH RBC QN AUTO: 34.2 PG (ref 27–33)
MCHC RBC AUTO-ENTMCNC: 33.3 G/DL (ref 33.6–35)
MCV RBC AUTO: 102.7 FL (ref 81.4–97.8)
PLATELET # BLD AUTO: 214 K/UL (ref 164–446)
PMV BLD AUTO: 9.1 FL (ref 9–12.9)
POTASSIUM SERPL-SCNC: 3.5 MMOL/L (ref 3.6–5.5)
PROT SERPL-MCNC: 6.3 G/DL (ref 6–8.2)
RBC # BLD AUTO: 3.65 M/UL (ref 4.2–5.4)
SODIUM SERPL-SCNC: 139 MMOL/L (ref 135–145)
WBC # BLD AUTO: 8.5 K/UL (ref 4.8–10.8)

## 2023-01-18 PROCEDURE — 80053 COMPREHEN METABOLIC PANEL: CPT

## 2023-01-18 PROCEDURE — 99233 SBSQ HOSP IP/OBS HIGH 50: CPT | Performed by: INTERNAL MEDICINE

## 2023-01-18 PROCEDURE — 83690 ASSAY OF LIPASE: CPT

## 2023-01-18 PROCEDURE — 700105 HCHG RX REV CODE 258: Performed by: INTERNAL MEDICINE

## 2023-01-18 PROCEDURE — 85027 COMPLETE CBC AUTOMATED: CPT

## 2023-01-18 PROCEDURE — 700102 HCHG RX REV CODE 250 W/ 637 OVERRIDE(OP): Performed by: HOSPITALIST

## 2023-01-18 PROCEDURE — 94760 N-INVAS EAR/PLS OXIMETRY 1: CPT

## 2023-01-18 PROCEDURE — A9270 NON-COVERED ITEM OR SERVICE: HCPCS | Performed by: HOSPITALIST

## 2023-01-18 PROCEDURE — 770001 HCHG ROOM/CARE - MED/SURG/GYN PRIV*

## 2023-01-18 PROCEDURE — 36415 COLL VENOUS BLD VENIPUNCTURE: CPT

## 2023-01-18 PROCEDURE — 82962 GLUCOSE BLOOD TEST: CPT

## 2023-01-18 RX ORDER — SODIUM CHLORIDE, SODIUM LACTATE, POTASSIUM CHLORIDE, CALCIUM CHLORIDE 600; 310; 30; 20 MG/100ML; MG/100ML; MG/100ML; MG/100ML
INJECTION, SOLUTION INTRAVENOUS CONTINUOUS
Status: DISCONTINUED | OUTPATIENT
Start: 2023-01-18 | End: 2023-01-20 | Stop reason: HOSPADM

## 2023-01-18 RX ADMIN — DORZOLAMIDE HYDROCHLORIDE AND TIMOLOL MALEATE 1 DROP: 20; 5 SOLUTION/ DROPS OPHTHALMIC at 17:45

## 2023-01-18 RX ADMIN — ACETAMINOPHEN 650 MG: 325 TABLET, FILM COATED ORAL at 23:52

## 2023-01-18 RX ADMIN — SODIUM CHLORIDE, POTASSIUM CHLORIDE, SODIUM LACTATE AND CALCIUM CHLORIDE: 600; 310; 30; 20 INJECTION, SOLUTION INTRAVENOUS at 17:59

## 2023-01-18 RX ADMIN — DORZOLAMIDE HYDROCHLORIDE AND TIMOLOL MALEATE 1 DROP: 20; 5 SOLUTION/ DROPS OPHTHALMIC at 08:53

## 2023-01-18 ASSESSMENT — LIFESTYLE VARIABLES
EVER HAD A DRINK FIRST THING IN THE MORNING TO STEADY YOUR NERVES TO GET RID OF A HANGOVER: NO
TOTAL SCORE: 0
TOTAL SCORE: 0
EVER FELT BAD OR GUILTY ABOUT YOUR DRINKING: NO
HAVE PEOPLE ANNOYED YOU BY CRITICIZING YOUR DRINKING: NO
HOW MANY TIMES IN THE PAST YEAR HAVE YOU HAD 5 OR MORE DRINKS IN A DAY: 0
CONSUMPTION TOTAL: NEGATIVE
ON A TYPICAL DAY WHEN YOU DRINK ALCOHOL HOW MANY DRINKS DO YOU HAVE: 0
ALCOHOL_USE: NO
TOTAL SCORE: 0
AVERAGE NUMBER OF DAYS PER WEEK YOU HAVE A DRINK CONTAINING ALCOHOL: 0
HAVE YOU EVER FELT YOU SHOULD CUT DOWN ON YOUR DRINKING: NO

## 2023-01-18 ASSESSMENT — ENCOUNTER SYMPTOMS
NAUSEA: 1
PALPITATIONS: 0
MYALGIAS: 0
INSOMNIA: 0
ABDOMINAL PAIN: 1
NECK PAIN: 0
BLURRED VISION: 0
FEVER: 0
DOUBLE VISION: 0
WEAKNESS: 0
DIZZINESS: 0
SORE THROAT: 0
VOMITING: 0
HEADACHES: 0
NAUSEA: 0
BRUISES/BLEEDS EASILY: 0
DEPRESSION: 0
VOMITING: 1
SHORTNESS OF BREATH: 0
COUGH: 0

## 2023-01-18 ASSESSMENT — COGNITIVE AND FUNCTIONAL STATUS - GENERAL
SUGGESTED CMS G CODE MODIFIER DAILY ACTIVITY: CH
SUGGESTED CMS G CODE MODIFIER MOBILITY: CH
MOBILITY SCORE: 24
DAILY ACTIVITIY SCORE: 24

## 2023-01-18 ASSESSMENT — PAIN DESCRIPTION - PAIN TYPE
TYPE: ACUTE PAIN
TYPE: ACUTE PAIN

## 2023-01-18 ASSESSMENT — PATIENT HEALTH QUESTIONNAIRE - PHQ9
2. FEELING DOWN, DEPRESSED, IRRITABLE, OR HOPELESS: NOT AT ALL
SUM OF ALL RESPONSES TO PHQ9 QUESTIONS 1 AND 2: 0
1. LITTLE INTEREST OR PLEASURE IN DOING THINGS: NOT AT ALL

## 2023-01-18 ASSESSMENT — FIBROSIS 4 INDEX
FIB4 SCORE: 1.99
FIB4 SCORE: 1.99

## 2023-01-18 NOTE — ASSESSMENT & PLAN NOTE
-Status to medical floor.  -Okay to progress diet-tolerating full diet  -Initial lipase is > 3000, findings consistent with acute pancreatitis with hazy pancreatic fat stranding.  Her ERCP in the morning for choledocholithiasis.  Prior history of cholecystectomy.  -Fluids:  mL/h.  -GI consult and recommending outpatient follow-up  -Overall improving  -Pain and Nausea control PRN  -Developed fever overnight so started on antibiotics

## 2023-01-18 NOTE — PROGRESS NOTES
"Hospital Medicine Daily Progress Note    Date of Service  1/18/2023    Chief Complaint  Jewell Diaz is a 82 y.o. female admitted 1/17/2023 with abdominal pain    Hospital Course  Per notes, \" 82 y.o. female, who underwent outpatient ERCP earlier today for choledocholithiasis that was \"Unsuccessful ERCP due to location of the biliary orifice\" by Dr. Walker with the plan to re-schedule a repeat outpatient ERCP.      States that she found out about choledocholithiasis on a \"regular yearly or hypodense mass of the liver, but instead, they found choledocholithiasis and she was referred to GI.  She has h/o Cholecystectomy.  She denies having abdominal pain prior to the procedure.  Eyes prior history of pancreatitis use.     She reports that after procedure, developed acute onset of abdominal pain, epigastric 10/10 in intensity, sharp, no radiation reason why she came to the ED on 1/17/2023 for evaluation.\"    Interval Problem Update  Still having epigastric pain  Per GI pending Dr. Walker' plan regarding repeat ERCP.  Also needs to continue with n.p.o. and IV fluids for pancreatitis.    I have discussed this patient's plan of care and discharge plan at IDT rounds today with Case Management, Nursing, Nursing leadership, and other members of the IDT team.    Consultants/Specialty  GI    Code Status  Full Code    Disposition  Patient is not medically cleared for discharge.   Anticipate discharge to to home with close outpatient follow-up.  I have placed the appropriate orders for post-discharge needs.    Review of Systems  Review of Systems   Constitutional:  Positive for malaise/fatigue.   Gastrointestinal:  Positive for abdominal pain, nausea and vomiting.   All other systems reviewed and are negative.     Physical Exam  Temp:  [36.7 °C (98 °F)] 36.7 °C (98 °F)  Pulse:  [67-91] 67  Resp:  [16-18] 16  BP: (117-148)/(51-64) 133/57  SpO2:  [94 %-100 %] 96 %    Physical Exam  Vitals and nursing note reviewed. "   Constitutional:       Appearance: Normal appearance.   Cardiovascular:      Rate and Rhythm: Normal rate and regular rhythm.      Pulses: Normal pulses.      Heart sounds: Normal heart sounds.   Pulmonary:      Effort: Pulmonary effort is normal.      Breath sounds: Normal breath sounds.   Abdominal:      General: Abdomen is flat. Bowel sounds are normal.      Tenderness: There is abdominal tenderness.   Skin:     General: Skin is warm and dry.   Neurological:      General: No focal deficit present.      Mental Status: She is alert and oriented to person, place, and time. Mental status is at baseline.       Fluids    Intake/Output Summary (Last 24 hours) at 1/18/2023 1409  Last data filed at 1/18/2023 0532  Gross per 24 hour   Intake 1000 ml   Output --   Net 1000 ml       Laboratory  Recent Labs     01/17/23 1957 01/18/23  0415   WBC 12.2* 8.5   RBC 4.01* 3.65*   HEMOGLOBIN 13.6 12.5   HEMATOCRIT 41.1 37.5   .5* 102.7*   MCH 33.9* 34.2*   MCHC 33.1* 33.3*   RDW 49.1 49.1   PLATELETCT 303 214   MPV 9.6 9.1     Recent Labs     01/17/23 1957 01/18/23  0438   SODIUM 139 139   POTASSIUM 3.6 3.5*   CHLORIDE 105 106   CO2 22 24   GLUCOSE 103* 138*   BUN 18 16   CREATININE 0.65 0.62   CALCIUM 9.6 8.9                   Imaging  CT-ABDOMEN-PELVIS WITH   Final Result         1.  Mild intrahepatic and extra hepatic biliary ductal dilatation. There is radiodensity in the distal common bile duct favoring choledocholithiasis.   2.  Hazy peripancreatic fat stranding, appearance compatible with pancreatitis, likely gallstone pancreatitis.   3.  Intermediate density nodular cyst along the anterior margin of the liver, of uncertain significance, overall similar compared to prior study.   4.  Small hiatal hernia   5.  Segment 2 duodenal diverticula.   6.  Diverticulosis   7.  Atherosclerosis and atherosclerotic coronary artery disease      These findings were discussed with the patient's clinician, Greg Butt, on  "1/17/2023 9:37 PM.           Assessment/Plan  * Choledocholithiasis- (present on admission)  Assessment & Plan  - G showing mild intrahepatic and extrahepatic biliary ductal dilation, findings favoring choledocholithiasis.    -Patient had outpatient ERCP done earlier in the day for choledocholithiasis by Dr. Walker.  Per note, the procedure was \"unsuccessful ERCP due to location of biliary orifice\".  Prior history of cholecystectomy.  -Also per GI note there are plans for re-schedule ERCP.  -GI consulting, will discuss with Dr. Gonzalez and decide on repeat ERCP.  For now patient will need IV fluids and n.p.o. for pancreatitis.    Acute pancreatitis  Assessment & Plan  -Status to medical floor.  -Strict NPO.  -Initial lipase is > 3000, findings consistent with acute pancreatitis with hazy pancreatic fat stranding.  Her ERCP in the morning for choledocholithiasis.  Prior history of cholecystectomy.  -Fluids:  mL/h.  -GI consult and recommendations  -Pain and Nausea control PRN      Hypodense mass of liver- (present on admission)  Assessment & Plan  -This was incidentally found on ultrasound of the liver in June 2021 showing a 2.3 cm solid hypoechoic mass superior to the liver and additional 2.6 cm hypoechoic mass in the upper for all posterior right hepatic lobe.    -CT scan done today showing intermediate density nodular cyst along the anterior margin of the liver of uncertain significance overall similar to compare prior study.  -Continue to follow-up outpatient.    Leukocytosis- (present on admission)  Assessment & Plan  BBC is 12.2.  This is likely reactive.  There is no signs of infection and no other sirs criteria.  We will continue to monitor this with CBC in the morning.    HLD (hyperlipidemia)- (present on admission)  Assessment & Plan  Aches atorvastatin which I will hold for now as she is n.p.o.    Hypertension- (present on admission)  Assessment & Plan  -Patient takes amlodipine, losartan and " spironolactone.  Will give as needed IV antihypertensives.  She is NPO.    Type 2 diabetes mellitus with circulatory disorder, without long-term current use of insulin (HCC)- (present on admission)  Assessment & Plan  -NPO now. RISS         VTE prophylaxis: SCDs/TEDs    I have performed a physical exam and reviewed and updated ROS and Plan today (1/18/2023). In review of yesterday's note (1/17/2023), there are no changes except as documented above.

## 2023-01-18 NOTE — H&P
"Hospital Medicine History & Physical Note    Date of Service  1/17/2023    Primary Care Physician  Philippe Krishna D.O.    Consultants  GI    Specialist Names: ERP discussed with Dr. Ivory    Code Status  Full Code    Chief Complaint  Chief Complaint   Patient presents with    Post-Op Complications    Post-Op Pain     Pt had an ERCP done this am, MD informed her that they were not able to get the stones out.  She was sent home and is needing to reschedule.  However she comes in d/t increased abdominal pain and nausea.  She denies vomiting, fever, chills.         History of Presenting Illness  Jewell Diaz is a 82 y.o. female, who underwent outpatient ERCP earlier today for choledocholithiasis that was \"Unsuccessful ERCP due to location of the biliary orifice\" by Dr. Walker with the plan to re-schedule a repeat outpatient ERCP.     States that she found out about choledocholithiasis on a \"regular yearly or hypodense mass of the liver, but instead, they found choledocholithiasis and she was referred to GI.  She has h/o Cholecystectomy.  She denies having abdominal pain prior to the procedure.  Eyes prior history of pancreatitis use.    She reports that after procedure, developed acute onset of abdominal pain, epigastric 10/10 in intensity, sharp, no radiation reason why she came to the ED on 1/17/2023 for evaluation.    I discussed the plan of care with patient.    Review of Systems  Review of Systems   Constitutional:  Negative for fever.   HENT:  Negative for congestion and sore throat.    Eyes:  Negative for blurred vision and double vision.   Respiratory:  Negative for cough and shortness of breath.    Cardiovascular:  Negative for chest pain and palpitations.   Gastrointestinal:  Positive for abdominal pain. Negative for nausea and vomiting.   Genitourinary:  Negative for dysuria and urgency.   Musculoskeletal:  Negative for myalgias and neck pain.   Skin:  Negative for itching and rash. "   Neurological:  Negative for dizziness, weakness and headaches.   Endo/Heme/Allergies:  Does not bruise/bleed easily.   Psychiatric/Behavioral:  Negative for depression. The patient does not have insomnia.      Past Medical History   has a past medical history of CATARACT (2009), Diabetes (2007), DJD (degenerative joint disease) of thoracic spine, High cholesterol, Hypertension, Pain, RA (rheumatoid arthritis) (HCC), and Urinary incontinence (07/14/2020).    Surgical History   has a past surgical history that includes cataract ext w/iol (6/2009); lumbar laminectomy diskectomy (12/26/2009); fusion, spine, lumbar, plif (5/6/2010); yazan by laparoscopy (8/21/2012); vaginal hysterectomy total (1976); lumbar decompression (5/6/2010); cervical disk and fusion anterior (7/20/2020); and pr ercp,diagnostic (N/A, 1/17/2023).     Family History  family history includes Cancer in her sister.   Family history reviewed with patient. There is no family history that is pertinent to the chief complaint.     Social History   reports that she has never smoked. She has never used smokeless tobacco. She reports that she does not drink alcohol and does not use drugs.    Allergies  Allergies   Allergen Reactions    Nkda [No Known Drug Allergy]        Medications  Prior to Admission Medications   Prescriptions Last Dose Informant Patient Reported? Taking?   Blood Glucose Monitoring Suppl (BLOOD GLUCOSE MONITOR SYSTEM) w/Device Kit   Yes No   Sig: BLOOD GLUCOSE MONITOR SYSTEM w/Device KIT   Calcium-Vitamins C & D (CALCIUM/C/D PO)   Yes No   Sig: Take  by mouth every day.   Cholecalciferol (VITAMIN D) 50 MCG (2000 UT) Cap   Yes No   Sig: Take  by mouth every day.   Ferrous Sulfate (IRON) 325 (65 Fe) MG Tab   Yes No   Sig: Take  by mouth every day.   acetaminophen (TYLENOL) 325 MG Tab   Yes No   Sig: Take 650 mg by mouth every four hours as needed.   amLODIPine (NORVASC) 5 MG Tab   No No   Sig: Take 1 Tablet by mouth every evening for 90  days.   atorvastatin (LIPITOR) 40 MG Tab   No No   Sig: Take 1 Tablet by mouth every evening.   diphenhydrAMINE-APAP, sleep, (TYLENOL PM EXTRA STRENGTH PO)   Yes No   Sig: Take  by mouth at bedtime.   dorzolamide-timolol (COSOPT) 22.3-6.8 MG/ML Solution   Yes No   Sig: Administer  into both eyes 2 times a day.   folic acid (FOLVITE) 1 MG Tab  Patient Yes No   Sig: Take 1 mg by mouth every day.   glucose blood strip   No No   Si Strip by Other route every day.   hydrochlorothiazide (MICROZIDE) 12.5 MG capsule   No No   Sig: Take 1 Capsule by mouth every day.   Patient taking differently: Take 12.5 mg by mouth every morning.   losartan (COZAAR) 100 MG Tab   No No   Sig: Take 1 Tablet by mouth every day.   Patient taking differently: Take 100 mg by mouth every morning.   metformin (GLUCOPHAGE) 1000 MG tablet   No No   Sig: metformin 1,000 mg tablet   Patient taking differently: 2 times a day with meals. metformin 1,000 mg tablet   methotrexate 2.5 MG tablet   Yes No   Sig: Take 10 mg by mouth every 7 days. 4 tablets on Thursday   multivitamin (THERAGRAN) TABS  Patient Yes No   Sig: Take 1 Tab by mouth every day.   mupirocin (BACTROBAN) 2 % Ointment   No No   Sig: Apply 1 Application topically 2 times a day.   omeprazole (PRILOSEC) 20 MG CPDR  Patient Yes No   Sig: Take 20 mg by mouth every morning.   pioglitazone (ACTOS) 15 MG Tab   No No   Sig: Take 1 Tablet by mouth every day.   spironolactone (ALDACTONE) 25 MG Tab   No No   Sig: Take 1 Tablet by mouth 2 times a day.      Facility-Administered Medications: None       Physical Exam  Temp:  [35.8 °C (96.5 °F)-36.7 °C (98 °F)] 36.7 °C (98 °F)  Pulse:  [69-78] 70  Resp:  [14-18] 18  BP: (122-160)/(53-80) 134/60  SpO2:  [91 %-100 %] 100 %  Blood Pressure : 134/60   Temperature: 36.7 °C (98 °F)   Pulse: 70   Respiration: 18   Pulse Oximetry: 100 %       Physical Exam  Constitutional:       Appearance: Normal appearance.   HENT:      Head: Normocephalic and atraumatic.       Mouth/Throat:      Mouth: Mucous membranes are moist.      Pharynx: Oropharynx is clear.   Eyes:      Extraocular Movements: Extraocular movements intact.      Pupils: Pupils are equal, round, and reactive to light.   Cardiovascular:      Rate and Rhythm: Normal rate and regular rhythm.      Heart sounds: Normal heart sounds.   Pulmonary:      Effort: Pulmonary effort is normal.      Breath sounds: Normal breath sounds.   Abdominal:      General: Abdomen is flat. Bowel sounds are normal. There is no distension.      Palpations: Abdomen is soft.      Tenderness: There is abdominal tenderness (Mild epigastric paint to palpation).   Musculoskeletal:      Cervical back: Normal range of motion and neck supple.   Skin:     General: Skin is warm and dry.   Neurological:      General: No focal deficit present.      Mental Status: She is alert and oriented to person, place, and time.   Psychiatric:         Mood and Affect: Mood normal.         Behavior: Behavior normal.       Laboratory:  Recent Labs     01/17/23 1957   WBC 12.2*   RBC 4.01*   HEMOGLOBIN 13.6   HEMATOCRIT 41.1   .5*   MCH 33.9*   MCHC 33.1*   RDW 49.1   PLATELETCT 303   MPV 9.6     Recent Labs     01/17/23 1957   SODIUM 139   POTASSIUM 3.6   CHLORIDE 105   CO2 22   GLUCOSE 103*   BUN 18   CREATININE 0.65   CALCIUM 9.6     Recent Labs     01/17/23 1957   ALTSGPT 21   ASTSGOT 27   ALKPHOSPHAT 77   TBILIRUBIN 0.5   LIPASE >3000*   GLUCOSE 103*         No results for input(s): NTPROBNP in the last 72 hours.      No results for input(s): TROPONINT in the last 72 hours.    Imaging:  CT-ABDOMEN-PELVIS WITH   Final Result         1.  Mild intrahepatic and extra hepatic biliary ductal dilatation. There is radiodensity in the distal common bile duct favoring choledocholithiasis.   2.  Hazy peripancreatic fat stranding, appearance compatible with pancreatitis, likely gallstone pancreatitis.   3.  Intermediate density nodular cyst along the anterior  "margin of the liver, of uncertain significance, overall similar compared to prior study.   4.  Small hiatal hernia   5.  Segment 2 duodenal diverticula.   6.  Diverticulosis   7.  Atherosclerosis and atherosclerotic coronary artery disease      These findings were discussed with the patient's clinician, Greg Butt, on 1/17/2023 9:37 PM.          X-Ray:  I have personally reviewed the images and compared with prior images.    Assessment/Plan:  Justification for Admission Status  I anticipate this patient will require at least two midnights for appropriate medical management, necessitating inpatient admission because -82 year-old  F, coming with acute pancreatitis and choledocholithiasis finding.  Prior ERCP earlier in the day that was unsuccessful due to anatomy of orifice.         * Choledocholithiasis- (present on admission)  Assessment & Plan  - G showing mild intrahepatic and extrahepatic biliary ductal dilation, findings favoring choledocholithiasis.    -Patient had outpatient ERCP done earlier in the day for choledocholithiasis by Dr. Walker.  Per note, the procedure was \"unsuccessful ERCP due to location of biliary orifice\".  Prior history of cholecystectomy.  -Also per GI note there are plans for re-schedule ERCP.  -IERP discussed with GI Dr. Ivory.  I appreciate consult recommendations.    Acute pancreatitis  Assessment & Plan  -Status to medical floor.  -Strict NPO.  -Initial lipase is > 3000, findings consistent with acute pancreatitis with hazy pancreatic fat stranding.  Her ERCP in the morning for choledocholithiasis.  Prior history of cholecystectomy.  -LFTs.  -Fluids:  mL/h.  -I appreciate GI consult and recommendations: ERP discussed with Dr. Ivory  -Pain and Nausea control PRN  -No elevated LFT's      Leukocytosis- (present on admission)  Assessment & Plan  BBC is 12.2.  This is likely reactive.  There is no signs of infection and no other sirs criteria.  We will continue to monitor " this with CBC in the morning.    Hypodense mass of liver- (present on admission)  Assessment & Plan  -This was incidentally found on ultrasound of the liver in June 2021 showing a 2.3 cm solid hypoechoic mass superior to the liver and additional 2.6 cm hypoechoic mass in the upper for all posterior right hepatic lobe.    -CT scan done today showing intermediate density nodular cyst along the anterior margin of the liver of uncertain significance overall similar to compare prior study.  -Continue to follow-up outpatient.    HLD (hyperlipidemia)- (present on admission)  Assessment & Plan  Aches atorvastatin which I will hold for now as she is n.p.o.    Hypertension- (present on admission)  Assessment & Plan  -Patient takes amlodipine, losartan and spironolactone.  Will give as needed IV antihypertensives.  She is NPO.    Type 2 diabetes mellitus with circulatory disorder, without long-term current use of insulin (HCC)- (present on admission)  Assessment & Plan  -NPO now. RISS        VTE prophylaxis: SCDs/TEDs

## 2023-01-18 NOTE — PROGRESS NOTES
Patient admitted to room 114. Alert and oriented x4 in no acute respiratory distress. Denies any complains at this time. Discussed plan of care and understood. Call light placed within reach.

## 2023-01-18 NOTE — ASSESSMENT & PLAN NOTE
BBC is 12.2.  This is likely reactive.  There is no signs of infection and no other sirs criteria.  We will continue to monitor this with CBC in the morning.

## 2023-01-18 NOTE — CONSULTS
DATE OF SERVICE:  01/18/2023     REASON FOR CONSULTATION:  Pancreatitis.     HISTORY OF PRESENT ILLNESS:  The patient is a pleasant 82-year-old female who   has a history of choledocholithiasis.  The patient underwent outpatient ERCP   attempt with Dr. Walker yesterday; however, it was unsuccessful.  The patient   had a periampullary diverticulum and difficult access to the ampulla and he   was unable to remove the stone successfully.  The patient went home for   approximately 4 hours and had lunch.  The patient then started having severe   epigastric pain that radiated to her back as well as nausea without vomiting.    The patient denies fever, chest pain, shortness of breath, blood in the   stool, change in bowel habits.  At the time of exam this morning, patient is   feeling better than she was overnight, when she came in.     PAST MEDICAL HISTORY:  Choledocholithiasis, diabetes, cataracts, degenerative   joint disease, dyslipidemia, hypertension, rheumatoid arthritis.     PAST SURGICAL HISTORY:  ERCP cervical disk and fusion, anterior,   cholecystectomy, lumbar decompression and laminectomy and diskectomy, cataract   extraction, vaginal hysterectomy.     FAMILY HISTORY:  Breast cancer in sister.     SOCIAL HISTORY:  The patient denies tobacco, alcohol or illicit drug use.     MEDICATIONS:  See medication rec list.     ALLERGIES:  No known drug allergies.     REVIEW OF SYSTEMS:  A 14-point review of systems was obtained and found to be   negative except for those above in the HPI.     PHYSICAL EXAMINATION:  GENERAL:  No acute distress, alert, awake and oriented x3.  VITAL SIGNS:  Stable.  HEENT:  PERRLA.  Extraocular movements intact.  Sclerae are anicteric.  HEART:  Regular rate and rhythm.  LUNGS:  Clear to auscultation bilaterally.  ABDOMEN:  Minimal tenderness to deep palpation of the epigastrium.  No   guarding or rebound.  MUSCULOSKELETAL:  No edema noted on bilateral lower extremities.  NEUROLOGIC:   Grossly intact.  PSYCHIATRIC:  Normal.  SKIN:  No jaundice or pallor.     RESULTS:  Labs:  White count 8.5, hemoglobin 12.5, hematocrit 37.5, platelets   214.  Sodium 139, potassium 3.5, BUN 16, creatinine 0.62.  AST 22, ALT 17, alk   phos 67, total bilirubin 0.5, albumin 3.7, lipase greater than 3000.     IMAGING:  CT abdomen and pelvis with contrast shows mild intrahepatic and   extrahepatic biliary ductal dilatation.  There is a radiodensity in the distal   common bile duct favoring choledocholithiasis, peripancreatic fat stranding   appearance compatible with pancreatitis, likely gallstone pancreatitis,   intermediate density nodular cyst along the anterior margin liver of uncertain   significance.  Overall, similar compared to prior study.  Small hiatal hernia   segment 2, duodenal diverticula, diverticulosis, atherosclerosis and   atherosclerotic coronary artery disease.     ASSESSMENT AND PLAN:  1.  Choledocholithiasis.  We will discuss with Dr. Milton Walker about timing   of reattempt for ERCP whether inpatient versus outpatient. We will leave this   up to him.  The patient is currently n.p.o. due to pancreatitis.  2.  Post-ERCP pancreatitis.  Continue with aggressive IV fluids, antiemetics   and analgesics as needed.  Keep the patient n.p.o. for now.  3.  Choledocholithiasis.  4.  Epigastric abdominal pain, likely secondary to pancreatitis.  See above   for management.  5.  We will follow along for now.  See above for current management.  Defer   timing of repeat ERCP attempt to Dr. Walker.        ______________________________  DO NIMISHA Griffin    DD:  01/18/2023 06:48  DT:  01/18/2023 07:33    Job#:  [id number]

## 2023-01-18 NOTE — ASSESSMENT & PLAN NOTE
"- G showing mild intrahepatic and extrahepatic biliary ductal dilation, findings favoring choledocholithiasis.    -Patient had outpatient ERCP done earlier in the day for choledocholithiasis by Dr. Walker.  Per note, the procedure was \"unsuccessful ERCP due to location of biliary orifice\".  Prior history of cholecystectomy.  -Also per GI note there are plans for re-schedule ERCP.  -GI consulting, will discuss with Dr. Gonzalez and decide on repeat ERCP.  For now patient will need IV fluids and n.p.o. for pancreatitis.  "

## 2023-01-18 NOTE — ED PROVIDER NOTES
ER Provider Note    Scribed for Greg Butt D.O. by Kaci Bowie. 1/17/2023  7:56 PM    Primary Care Provider: Philippe Krishna D.O.    CHIEF COMPLAINT  Chief Complaint   Patient presents with    Post-Op Complications    Post-Op Pain     Pt had an ERCP done this am, MD informed her that they were not able to get the stones out.  She was sent home and is needing to reschedule.  However she comes in d/t increased abdominal pain and nausea.  She denies vomiting, fever, chills.       EXTERNAL RECORDS REVIEWED  Outpatient Notes show that the patient had a ERCP done and she was a difficult intubation. Unable to remove stones so she was discharged home.    HPI/ROS  LIMITATION TO HISTORY   Select: Poor historian    OUTSIDE HISTORIAN(S):  Family at bedside who states that they were unable to get the stones out.    Jewell Diaz is a 82 y.o. female who presents to the ED complaining of abdominal pain onset this morning. The patient had a ERCP procedure done this morning but were unable to get the stones out and was told to reschedule. She did not have this pain before the procedure. She also experiences nausea and vomiting but no diarrhea or fever.     ROS as per HPI.     PAST MEDICAL HISTORY  Past Medical History:   Diagnosis Date    CATARACT 2009    BILATERAL REMOVAL     Diabetes 2007    diet,oral meds    DJD (degenerative joint disease) of thoracic spine     High cholesterol     Hypertension     Pain     legs,back,pain scale 6    RA (rheumatoid arthritis) ([facility])     Hands    Urinary incontinence 07/14/2020       SURGICAL HISTORY  Past Surgical History:   Procedure Laterality Date    SC ERCP,DIAGNOSTIC N/A 1/17/2023    Procedure: ENDOSCOPIC RETROGRADE CHOLANGIOPANCREATOGRAPHY;  Surgeon: Milton Walker M.D.;  Location: SURGERY [facility];  Service: Gastroenterology    CERVICAL DISK AND FUSION ANTERIOR  7/20/2020    Procedure: DISCECTOMY, SPINE, CERVICAL, ANTERIOR APPROACH, WITH FUSION- C4-6;  Surgeon:  Ezra Bailey M.D.;  Location: SURGERY [facility];  Service: Neurosurgery    SHOSHANA BY LAPAROSCOPY  8/21/2012    Performed by CAMMIE HUGHES at SURGERY [facility]    FUSION, SPINE, LUMBAR, PLIF  5/6/2010    Performed by CASSI RESTREPO at SURGERY [facility]    LUMBAR DECOMPRESSION  5/6/2010    Performed by CASSI RESTREPO at SURGERY [facility]    LUMBAR LAMINECTOMY DISKECTOMY  12/26/2009    Performed by CASSI RESTREPO at SURGERY [facility]    CATARACT EXT W/IOL  6/2009    bilateral    VAGINAL HYSTERECTOMY TOTAL  1976    Hysterectomy,Total Vaginal       FAMILY HISTORY  Family History   Problem Relation Age of Onset    Cancer Sister         breast       SOCIAL HISTORY   reports that she has never smoked. She has never used smokeless tobacco. She reports that she does not drink alcohol and does not use drugs.    CURRENT MEDICATIONS  Previous Medications    ACETAMINOPHEN (TYLENOL) 325 MG TAB    Take 650 mg by mouth every four hours as needed.    AMLODIPINE (NORVASC) 5 MG TAB    Take 1 Tablet by mouth every evening for 90 days.    ATORVASTATIN (LIPITOR) 40 MG TAB    Take 1 Tablet by mouth every evening.    BLOOD GLUCOSE MONITORING SUPPL (BLOOD GLUCOSE MONITOR SYSTEM) W/DEVICE KIT    BLOOD GLUCOSE MONITOR SYSTEM w/Device KIT    CALCIUM-VITAMINS C & D (CALCIUM/C/D PO)    Take  by mouth every day.    CHOLECALCIFEROL (VITAMIN D) 50 MCG (2000 UT) CAP    Take  by mouth every day.    DIPHENHYDRAMINE-APAP, SLEEP, (TYLENOL PM EXTRA STRENGTH PO)    Take  by mouth at bedtime.    DORZOLAMIDE-TIMOLOL (COSOPT) 22.3-6.8 MG/ML SOLUTION    Administer  into both eyes 2 times a day.    FERROUS SULFATE (IRON) 325 (65 FE) MG TAB    Take  by mouth every day.    FOLIC ACID (FOLVITE) 1 MG TAB    Take 1 mg by mouth every day.    GLUCOSE BLOOD STRIP    1 Strip by Other route every day.    HYDROCHLOROTHIAZIDE (MICROZIDE) 12.5 MG CAPSULE    Take 1 Capsule by mouth every day.    LOSARTAN (COZAAR) 100 MG TAB    Take 1 Tablet by  "mouth every day.    METFORMIN (GLUCOPHAGE) 1000 MG TABLET    metformin 1,000 mg tablet    METHOTREXATE 2.5 MG TABLET    Take 10 mg by mouth every 7 days. 4 tablets on Thursday    MULTIVITAMIN (THERAGRAN) TABS    Take 1 Tab by mouth every day.    MUPIROCIN (BACTROBAN) 2 % OINTMENT    Apply 1 Application topically 2 times a day.    OMEPRAZOLE (PRILOSEC) 20 MG CPDR    Take 20 mg by mouth every morning.    PIOGLITAZONE (ACTOS) 15 MG TAB    Take 1 Tablet by mouth every day.    SPIRONOLACTONE (ALDACTONE) 25 MG TAB    Take 1 Tablet by mouth 2 times a day.       ALLERGIES  Nkda [no known drug allergy]    PHYSICAL EXAM  /62   Pulse 75   Temp 36.7 °C (98 °F) (Temporal)   Resp 18   Ht 1.549 m (5' 1\")   Wt 61.7 kg (136 lb)   SpO2 98%   BMI 25.70 kg/m²     General: No acute distress.  HENT: Normocephalic, Mucus membranes are moist.   Chest: Lungs have even and unlabored respirations, Clear to auscultation.   Cardiovascular: Regular rate and rhythm, No peripheral cyanosis.  Abdomen: Non distended. Diffuse abdominal tenderness.   Neuro: Awake, Conversive, Able to relay recent events.  Psychiatric: Calm and cooperative.      DIAGNOSTIC STUDIES    Labs:   Results for orders placed or performed during the hospital encounter of 01/17/23   CBC with Differential   Result Value Ref Range    WBC 12.2 (H) 4.8 - 10.8 K/uL    RBC 4.01 (L) 4.20 - 5.40 M/uL    Hemoglobin 13.6 12.0 - 16.0 g/dL    Hematocrit 41.1 37.0 - 47.0 %    .5 (H) 81.4 - 97.8 fL    MCH 33.9 (H) 27.0 - 33.0 pg    MCHC 33.1 (L) 33.6 - 35.0 g/dL    RDW 49.1 35.9 - 50.0 fL    Platelet Count 303 164 - 446 K/uL    MPV 9.6 9.0 - 12.9 fL    Neutrophils-Polys 78.70 (H) 44.00 - 72.00 %    Lymphocytes 9.50 (L) 22.00 - 41.00 %    Monocytes 10.60 0.00 - 13.40 %    Eosinophils 0.70 0.00 - 6.90 %    Basophils 0.20 0.00 - 1.80 %    Immature Granulocytes 0.30 0.00 - 0.90 %    Nucleated RBC 0.00 /100 WBC    Neutrophils (Absolute) 9.55 (H) 2.00 - 7.15 K/uL    Lymphs " (Absolute) 1.16 1.00 - 4.80 K/uL    Monos (Absolute) 1.29 (H) 0.00 - 0.85 K/uL    Eos (Absolute) 0.09 0.00 - 0.51 K/uL    Baso (Absolute) 0.03 0.00 - 0.12 K/uL    Immature Granulocytes (abs) 0.04 0.00 - 0.11 K/uL    NRBC (Absolute) 0.00 K/uL   Complete Metabolic Panel   Result Value Ref Range    Sodium 139 135 - 145 mmol/L    Potassium 3.6 3.6 - 5.5 mmol/L    Chloride 105 96 - 112 mmol/L    Co2 22 20 - 33 mmol/L    Anion Gap 12.0 7.0 - 16.0    Glucose 103 (H) 65 - 99 mg/dL    Bun 18 8 - 22 mg/dL    Creatinine 0.65 0.50 - 1.40 mg/dL    Calcium 9.6 8.4 - 10.2 mg/dL    AST(SGOT) 27 12 - 45 U/L    ALT(SGPT) 21 2 - 50 U/L    Alkaline Phosphatase 77 30 - 99 U/L    Total Bilirubin 0.5 0.1 - 1.5 mg/dL    Albumin 4.3 3.2 - 4.9 g/dL    Total Protein 7.3 6.0 - 8.2 g/dL    Globulin 3.0 1.9 - 3.5 g/dL    A-G Ratio 1.4 g/dL   Lipase   Result Value Ref Range    Lipase >3000 (H) 7 - 58 U/L   CORRECTED CALCIUM   Result Value Ref Range    Correct Calcium 9.4 8.5 - 10.5 mg/dL   ESTIMATED GFR   Result Value Ref Range    GFR (CKD-EPI) 89 >60 mL/min/1.73 m 2   EKG   Result Value Ref Range    Report       [facility]    Test Date:  2023  Pt Name:    MATILDA HERNADEZ              Department: [facility]  MRN:        [id number]                      Room:       -ROOM 8  Gender:     Female                       Technician: JERMAINE  :        [id number]                   Requested By:CHARIS BOOTHE  Order #:    [id number]                    Reading MD: CHARIS BOOTHE, D.O.    Measurements  Intervals                                Axis  Rate:       70                           P:          59  IN:         148                          QRS:        2  QRSD:       92                           T:          34  QT:         408  QTc:        441    Interpretive Statements  SINUS RHYTHM  NONSPECIFIC T ABNORMALITIES, ANTERIOR LEADS  Compared to ECG 01/10/2023 09:44:50  T-wave abnormalitynot changed  Electronically  Signed On 1- 21:53:28 PST by CHARIS BOOTHE D.O.        All labs reviewed by me.      Radiology:   The attending emergency physician has independently interpreted the diagnostic imaging associated with this visit and am waiting the final reading from the radiologist.     CT-ABDOMEN-PELVIS WITH   Final Result         1.  Mild intrahepatic and extra hepatic biliary ductal dilatation. There is radiodensity in the distal common bile duct favoring choledocholithiasis.   2.  Hazy peripancreatic fat stranding, appearance compatible with pancreatitis, likely gallstone pancreatitis.   3.  Intermediate density nodular cyst along the anterior margin of the liver, of uncertain significance, overall similar compared to prior study.   4.  Small hiatal hernia   5.  Segment 2 duodenal diverticula.   6.  Diverticulosis   7.  Atherosclerosis and atherosclerotic coronary artery disease      These findings were discussed with the patient's clinician, Charis Boothe, on 1/17/2023 9:37 PM.            COURSE & MEDICAL DECISION MAKING     ED Observation Status? Yes; I am placing the patient in to an observation status due to a diagnostic uncertainty as well as therapeutic intensity. Patient placed in observation status at 8:00 PM, 1/17/2023.     Observation plan is as follows: For pain control and evaluation for response. Evaluation for etiology of pain.     Upon Reevaluation, the patient's condition has: not improved; and will be escalated to hospitalization.    Patient discharged from ED Observation status at 9:44 PM (Time) 1/17/2023 (Date).     INITIAL ASSESSMENT AND PLAN  Care Narrative: Patient had a ERCP today for choledocholithiasis. It was reported that they were unable to remove the stones and she was discharged home. She developed the pain after, no pain prior. This is concerning for biliary obstruction, infection, perforation.    8:00 PM - Patient seen and examined at bedside. Discussed plan of care, including labs  and imagine. Patient agrees to the plan of care. The patient will be medicated with Zofran 4 mg and Dilaudid 0.5 mg injection. Ordered for CT-Abdomen Pelvis with contrast, UA, Lipase, CMP, and CBC w/ Diff to evaluate her symptoms.      9:44 PM - Patient was reevaluated at bedside. Discussed lab and radiology results with the patient. The patient had the opportunity to ask any questions. The plan for hospitalization was discussed with the patient given their current presentation and diagnostic study results. Patient is understanding and agreeable to the plan for hospitalization. Paged GI and Hospitalist.    9:53 PM - I discussed the patient's case and the above findings with Dr. Ivory (GI).    ADDITIONAL PROBLEM LIST AND DISPOSITION    Problem #1: Vomiting  Problem #2: Abdominal pain    I have discussed management of the patient with the following physicians and SULMA's: Dr. Gasca (Hospitalist), Dr. Ivory (GI).    ED Course: The patient presented with ERCP that was done today, unfortunately were not able to take out the stones.  And patient was discharged home.  She had no pain prior to the surgery and pain started afterwards.  Her laboratory shows elevated lipase consistent with pancreatitis, and a CT does show choledocholithiasis with inflammation.    Her bilirubin is normal.    The patient was medicated with pain with good results, and she will be admitted for further evaluation and treatment.    FINAL DISPOSITION:  Patient will be hospitalized by Dr. Gasca in guarded condition.    FINAL DIANGOSIS  1. Post-op pain    2. Acute biliary pancreatitis, unspecified complication status    3. Choledocholithiasis      Kaci VORA), am scribing for, and in the presence of, Greg Butt D.O..    Electronically signed by: Kaci Bowie (Doni), 1/17/2023    Greg VORA D.O. personally performed the services described in this documentation, as scribed by Kaci Bowie in my presence, and it is both  accurate and complete.      The note accurately reflects work and decisions made by me.  Greg Butt D.O.  1/17/2023  10:34 PM

## 2023-01-18 NOTE — ED NOTES
Pt medicated for pain per MAR after pt requesting pain relief.  MD made this RN aware that pt is now NPO for upcoming procedure.

## 2023-01-18 NOTE — HOSPITAL COURSE
"Per notes, \" 82 y.o. female, who underwent outpatient ERCP earlier today for choledocholithiasis that was \"Unsuccessful ERCP due to location of the biliary orifice\" by Dr. Walker with the plan to re-schedule a repeat outpatient ERCP.      States that she found out about choledocholithiasis on a \"regular yearly or hypodense mass of the liver, but instead, they found choledocholithiasis and she was referred to GI.  She has h/o Cholecystectomy.  She denies having abdominal pain prior to the procedure.  Eyes prior history of pancreatitis use.     She reports that after procedure, developed acute onset of abdominal pain, epigastric 10/10 in intensity, sharp, no radiation reason why she came to the ED on 1/17/2023 for evaluation.\"  "

## 2023-01-18 NOTE — ED TRIAGE NOTES
"Chief Complaint   Patient presents with    Post-Op Complications    Post-Op Pain     Pt had an ERCP done this am, MD informed her that they were not able to get the stones out.  She was sent home and is needing to reschedule.  However she comes in d/t increased abdominal pain and nausea.  She denies vomiting, fever, chills.       /62   Pulse 75   Temp 36.7 °C (98 °F) (Temporal)   Resp 18   Ht 1.549 m (5' 1\")   Wt 61.7 kg (136 lb)   SpO2 98%   BMI 25.70 kg/m²     "

## 2023-01-18 NOTE — ED NOTES
Pt reports ERCP this morning at 0900.  She reports band like abdominal pain.  Abdomen is soft but tender on palpation throughout lower abdoemn.  She reports passing flatus.  She does also endorse some dysuira.  IV established, blood drawn and sent, medicated for pain and nausea per MAR.  WCTM.  Awaitng CT and urine sample.

## 2023-01-18 NOTE — ED NOTES
Pt resting comfortably in ebd.  IVF infusing. Abdominal pain controlled at this time. C all bell in reach. Lights dimmed.  WCTM.

## 2023-01-18 NOTE — ASSESSMENT & PLAN NOTE
-This was incidentally found on ultrasound of the liver in June 2021 showing a 2.3 cm solid hypoechoic mass superior to the liver and additional 2.6 cm hypoechoic mass in the upper for all posterior right hepatic lobe.    -CT scan done today showing intermediate density nodular cyst along the anterior margin of the liver of uncertain significance overall similar to compare prior study.  -Continue to follow-up outpatient.

## 2023-01-18 NOTE — ED NOTES
Patient brought to the restroom via adelaida study. Safely back to the room with O2 sat:78%. Put back to O2 inhalation at 0.5 LPM.

## 2023-01-18 NOTE — ASSESSMENT & PLAN NOTE
-Patient takes amlodipine, losartan and spironolactone.  Will give as needed IV antihypertensives.  She is NPO.

## 2023-01-19 PROBLEM — E83.42 HYPOMAGNESEMIA: Status: ACTIVE | Noted: 2023-01-19

## 2023-01-19 LAB
ALBUMIN SERPL BCP-MCNC: 3.4 G/DL (ref 3.2–4.9)
ALBUMIN SERPL BCP-MCNC: 3.6 G/DL (ref 3.2–4.9)
ALBUMIN/GLOB SERPL: 1.2 G/DL
ALBUMIN/GLOB SERPL: 1.4 G/DL
ALP SERPL-CCNC: 61 U/L (ref 30–99)
ALP SERPL-CCNC: 68 U/L (ref 30–99)
ALT SERPL-CCNC: 13 U/L (ref 2–50)
ALT SERPL-CCNC: 14 U/L (ref 2–50)
ANION GAP SERPL CALC-SCNC: 13 MMOL/L (ref 7–16)
ANION GAP SERPL CALC-SCNC: 13 MMOL/L (ref 7–16)
AST SERPL-CCNC: 19 U/L (ref 12–45)
AST SERPL-CCNC: 22 U/L (ref 12–45)
BILIRUB SERPL-MCNC: 0.9 MG/DL (ref 0.1–1.5)
BILIRUB SERPL-MCNC: 1.3 MG/DL (ref 0.1–1.5)
BUN SERPL-MCNC: 10 MG/DL (ref 8–22)
BUN SERPL-MCNC: 11 MG/DL (ref 8–22)
CALCIUM ALBUM COR SERPL-MCNC: 8.9 MG/DL (ref 8.5–10.5)
CALCIUM ALBUM COR SERPL-MCNC: 9.2 MG/DL (ref 8.5–10.5)
CALCIUM SERPL-MCNC: 8.4 MG/DL (ref 8.4–10.2)
CALCIUM SERPL-MCNC: 8.9 MG/DL (ref 8.4–10.2)
CHLORIDE SERPL-SCNC: 102 MMOL/L (ref 96–112)
CHLORIDE SERPL-SCNC: 103 MMOL/L (ref 96–112)
CO2 SERPL-SCNC: 23 MMOL/L (ref 20–33)
CO2 SERPL-SCNC: 23 MMOL/L (ref 20–33)
CREAT SERPL-MCNC: 0.51 MG/DL (ref 0.5–1.4)
CREAT SERPL-MCNC: 0.63 MG/DL (ref 0.5–1.4)
ERYTHROCYTE [DISTWIDTH] IN BLOOD BY AUTOMATED COUNT: 49.9 FL (ref 35.9–50)
GFR SERPLBLD CREATININE-BSD FMLA CKD-EPI: 89 ML/MIN/1.73 M 2
GFR SERPLBLD CREATININE-BSD FMLA CKD-EPI: 94 ML/MIN/1.73 M 2
GLOBULIN SER CALC-MCNC: 2.5 G/DL (ref 1.9–3.5)
GLOBULIN SER CALC-MCNC: 2.9 G/DL (ref 1.9–3.5)
GLUCOSE BLD STRIP.AUTO-MCNC: 118 MG/DL (ref 65–99)
GLUCOSE BLD STRIP.AUTO-MCNC: 158 MG/DL (ref 65–99)
GLUCOSE BLD STRIP.AUTO-MCNC: 85 MG/DL (ref 65–99)
GLUCOSE SERPL-MCNC: 102 MG/DL (ref 65–99)
GLUCOSE SERPL-MCNC: 109 MG/DL (ref 65–99)
HCT VFR BLD AUTO: 38.6 % (ref 37–47)
HGB BLD-MCNC: 12.9 G/DL (ref 12–16)
LACTATE SERPL-SCNC: 1.1 MMOL/L (ref 0.5–2)
LACTATE SERPL-SCNC: 1.1 MMOL/L (ref 0.5–2)
LIPASE SERPL-CCNC: 389 U/L (ref 7–58)
MAGNESIUM SERPL-MCNC: 1 MG/DL (ref 1.5–2.5)
MCH RBC QN AUTO: 34.3 PG (ref 27–33)
MCHC RBC AUTO-ENTMCNC: 33.4 G/DL (ref 33.6–35)
MCV RBC AUTO: 102.7 FL (ref 81.4–97.8)
PLATELET # BLD AUTO: 210 K/UL (ref 164–446)
PMV BLD AUTO: 9.2 FL (ref 9–12.9)
POTASSIUM SERPL-SCNC: 3.2 MMOL/L (ref 3.6–5.5)
POTASSIUM SERPL-SCNC: 3.3 MMOL/L (ref 3.6–5.5)
PROT SERPL-MCNC: 5.9 G/DL (ref 6–8.2)
PROT SERPL-MCNC: 6.5 G/DL (ref 6–8.2)
RBC # BLD AUTO: 3.76 M/UL (ref 4.2–5.4)
SODIUM SERPL-SCNC: 138 MMOL/L (ref 135–145)
SODIUM SERPL-SCNC: 139 MMOL/L (ref 135–145)
WBC # BLD AUTO: 11.8 K/UL (ref 4.8–10.8)

## 2023-01-19 PROCEDURE — 83690 ASSAY OF LIPASE: CPT

## 2023-01-19 PROCEDURE — 700105 HCHG RX REV CODE 258: Performed by: HOSPITALIST

## 2023-01-19 PROCEDURE — 99233 SBSQ HOSP IP/OBS HIGH 50: CPT | Performed by: INTERNAL MEDICINE

## 2023-01-19 PROCEDURE — 82962 GLUCOSE BLOOD TEST: CPT

## 2023-01-19 PROCEDURE — 36415 COLL VENOUS BLD VENIPUNCTURE: CPT

## 2023-01-19 PROCEDURE — 700111 HCHG RX REV CODE 636 W/ 250 OVERRIDE (IP): Performed by: HOSPITALIST

## 2023-01-19 PROCEDURE — 83605 ASSAY OF LACTIC ACID: CPT | Mod: 91

## 2023-01-19 PROCEDURE — 83735 ASSAY OF MAGNESIUM: CPT

## 2023-01-19 PROCEDURE — 700102 HCHG RX REV CODE 250 W/ 637 OVERRIDE(OP): Performed by: HOSPITALIST

## 2023-01-19 PROCEDURE — 87040 BLOOD CULTURE FOR BACTERIA: CPT | Mod: 91

## 2023-01-19 PROCEDURE — 85027 COMPLETE CBC AUTOMATED: CPT

## 2023-01-19 PROCEDURE — 80053 COMPREHEN METABOLIC PANEL: CPT | Mod: 91

## 2023-01-19 PROCEDURE — 770001 HCHG ROOM/CARE - MED/SURG/GYN PRIV*

## 2023-01-19 PROCEDURE — 700105 HCHG RX REV CODE 258: Performed by: INTERNAL MEDICINE

## 2023-01-19 PROCEDURE — A9270 NON-COVERED ITEM OR SERVICE: HCPCS | Performed by: HOSPITALIST

## 2023-01-19 PROCEDURE — 94760 N-INVAS EAR/PLS OXIMETRY 1: CPT

## 2023-01-19 RX ORDER — POTASSIUM CHLORIDE 7.45 MG/ML
10 INJECTION INTRAVENOUS ONCE
Status: COMPLETED | OUTPATIENT
Start: 2023-01-19 | End: 2023-01-19

## 2023-01-19 RX ORDER — MAGNESIUM SULFATE HEPTAHYDRATE 40 MG/ML
2 INJECTION, SOLUTION INTRAVENOUS ONCE
Status: COMPLETED | OUTPATIENT
Start: 2023-01-19 | End: 2023-01-19

## 2023-01-19 RX ADMIN — PIPERACILLIN AND TAZOBACTAM 3.38 G: 3; .375 INJECTION, POWDER, LYOPHILIZED, FOR SOLUTION INTRAVENOUS; PARENTERAL at 12:02

## 2023-01-19 RX ADMIN — DORZOLAMIDE HYDROCHLORIDE AND TIMOLOL MALEATE 1 DROP: 20; 5 SOLUTION/ DROPS OPHTHALMIC at 19:13

## 2023-01-19 RX ADMIN — PIPERACILLIN AND TAZOBACTAM 3.38 G: 3; .375 INJECTION, POWDER, LYOPHILIZED, FOR SOLUTION INTRAVENOUS; PARENTERAL at 00:44

## 2023-01-19 RX ADMIN — DORZOLAMIDE HYDROCHLORIDE AND TIMOLOL MALEATE 1 DROP: 20; 5 SOLUTION/ DROPS OPHTHALMIC at 05:52

## 2023-01-19 RX ADMIN — POTASSIUM CHLORIDE 10 MEQ: 7.46 INJECTION, SOLUTION INTRAVENOUS at 03:35

## 2023-01-19 RX ADMIN — SENNOSIDES AND DOCUSATE SODIUM 2 TABLET: 50; 8.6 TABLET ORAL at 17:41

## 2023-01-19 RX ADMIN — PIPERACILLIN AND TAZOBACTAM 3.38 G: 3; .375 INJECTION, POWDER, LYOPHILIZED, FOR SOLUTION INTRAVENOUS; PARENTERAL at 20:56

## 2023-01-19 RX ADMIN — PIPERACILLIN AND TAZOBACTAM 3.38 G: 3; .375 INJECTION, POWDER, LYOPHILIZED, FOR SOLUTION INTRAVENOUS; PARENTERAL at 04:21

## 2023-01-19 RX ADMIN — SODIUM CHLORIDE, POTASSIUM CHLORIDE, SODIUM LACTATE AND CALCIUM CHLORIDE: 600; 310; 30; 20 INJECTION, SOLUTION INTRAVENOUS at 20:15

## 2023-01-19 RX ADMIN — MAGNESIUM SULFATE HEPTAHYDRATE 2 G: 2 INJECTION, SOLUTION INTRAVENOUS at 03:32

## 2023-01-19 RX ADMIN — SODIUM CHLORIDE, POTASSIUM CHLORIDE, SODIUM LACTATE AND CALCIUM CHLORIDE: 600; 310; 30; 20 INJECTION, SOLUTION INTRAVENOUS at 08:38

## 2023-01-19 ASSESSMENT — ENCOUNTER SYMPTOMS
ABDOMINAL PAIN: 1
COUGH: 0
NAUSEA: 0
SHORTNESS OF BREATH: 0
FEVER: 1
CONSTIPATION: 0
NAUSEA: 1
VOMITING: 0
BLOOD IN STOOL: 0
CHILLS: 0
VOMITING: 1
HEARTBURN: 0
ABDOMINAL PAIN: 0
DIARRHEA: 0

## 2023-01-19 ASSESSMENT — PATIENT HEALTH QUESTIONNAIRE - PHQ9
1. LITTLE INTEREST OR PLEASURE IN DOING THINGS: NOT AT ALL
2. FEELING DOWN, DEPRESSED, IRRITABLE, OR HOPELESS: NOT AT ALL
SUM OF ALL RESPONSES TO PHQ9 QUESTIONS 1 AND 2: 0

## 2023-01-19 ASSESSMENT — PAIN DESCRIPTION - PAIN TYPE
TYPE: ACUTE PAIN
TYPE: ACUTE PAIN

## 2023-01-19 NOTE — PROGRESS NOTES
Gastroenterology Progress Note     Author: Milton Walker M.D.   Date & Time Created: 1/19/2023 9:55 AM    Chief Complaint:  Post-ERCP pancreatitis    Interval History:  Patient reports having a fever last night. She says that her abdominal pain has improved completely and she is very hungry    Review of Systems:  Review of Systems   Constitutional:  Positive for fever. Negative for chills.   Respiratory:  Negative for cough and shortness of breath.    Gastrointestinal:  Negative for abdominal pain, blood in stool, constipation, diarrhea, heartburn, melena, nausea and vomiting.     Physical Exam:  Physical Exam  Constitutional:       General: She is not in acute distress.     Appearance: Normal appearance. She is not ill-appearing.   Abdominal:      General: Abdomen is flat. Bowel sounds are normal. There is no distension.      Palpations: Abdomen is soft.      Tenderness: There is no abdominal tenderness.   Neurological:      Mental Status: She is alert.       Labs:          Recent Labs     01/18/23 0438 01/19/23 0013 01/19/23 0440   SODIUM 139 138 139   POTASSIUM 3.5* 3.3* 3.2*   CHLORIDE 106 102 103   CO2 24 23 23   BUN 16 10 11   CREATININE 0.62 0.51 0.63   MAGNESIUM  --  1.0*  --    CALCIUM 8.9 8.9 8.4     Recent Labs     01/18/23 0438 01/18/23  0800 01/19/23 0013 01/19/23 0440   ALTSGPT 17  --  14 13   ASTSGOT 22  --  22 19   ALKPHOSPHAT 67  --  68 61   TBILIRUBIN 0.5  --  0.9 1.3   LIPASE >3000* >3000*  --  389*   GLUCOSE 138*  --  109* 102*     Recent Labs     01/17/23 1957 01/18/23 0415 01/19/23  0013   RBC 4.01* 3.65* 3.76*   HEMOGLOBIN 13.6 12.5 12.9   HEMATOCRIT 41.1 37.5 38.6   PLATELETCT 303 214 210     Recent Labs     01/17/23 1957 01/18/23 0415 01/18/23 0438 01/19/23 0013 01/19/23 0440   WBC 12.2* 8.5  --  11.8*  --    NEUTSPOLYS 78.70*  --   --   --   --    LYMPHOCYTES 9.50*  --   --   --   --    MONOCYTES 10.60  --   --   --   --    EOSINOPHILS 0.70  --   --   --   --    BASOPHILS  0.20  --   --   --   --    ASTSGOT 27  --  22 22 19   ALTSGPT 21  --  17 14 13   ALKPHOSPHAT 77  --  67 68 61   TBILIRUBIN 0.5  --  0.5 0.9 1.3     Hemodynamics:  Temp (24hrs), Av.7 °C (99.8 °F), Min:37.3 °C (99.1 °F), Max:38.4 °C (101.1 °F)  Temperature: 37.5 °C (99.5 °F)  Pulse  Av.6  Min: 67  Max: 91   Blood Pressure : 123/61, NIBP: 153/76     Respiratory:    Respiration: 17, Pulse Oximetry: 92 %           Fluids:    Intake/Output Summary (Last 24 hours) at 2023 0955  Last data filed at 2023 0735  Gross per 24 hour   Intake 0 ml   Output 600 ml   Net -600 ml     Weight: 61.7 kg (136 lb)  GI/Nutrition:  Orders Placed This Encounter   Procedures    Diet Order Diet: Consistent CHO (Diabetic); Nutrient modifications: (optional): Low Fat     Standing Status:   Standing     Number of Occurrences:   1     Order Specific Question:   Diet:     Answer:   Consistent CHO (Diabetic) [4]     Order Specific Question:   Nutrient modifications: (optional)     Answer:   Low Fat [5]     Medical Decision Making, by Problem:  Active Hospital Problems    Diagnosis     *Choledocholithiasis [K80.50]     Acute pancreatitis [K85.90]     Hypodense mass of liver [R16.0]     Leukocytosis [D72.829]     HLD (hyperlipidemia) [E78.5]     Type 2 diabetes mellitus with circulatory disorder, without long-term current use of insulin (HCC) [E11.59]     Hypertension [I10]        Plan:  Post-ERCP pancreatitis, persistent choledocholithiasis - patient has difficult anatomy. Her biliary orifice is on the side wall of a duodenal diverticulum. ERCP was attempted for more than 1 hour and unfortunately the stone could not be removed and she developed pancreatitis. Her pain is improved and she is hungry. Will advance to a low fat, DM diet. F/u cultures. Agree with antibiotics for now but fever may be due to pancreatitis. Likely can discharge home if no more fevers today. We will arrange f/u ERCP with Dr Terrazas at his next available  procedure date outpatient    Quality-Core Measures

## 2023-01-19 NOTE — PROGRESS NOTES
Annual Health Assessment Questions:    1.  Are you currently engaging in any exercise or physical activity? Yes    2.  How would you describe your mood or emotional well-being today? good    3.  Have you had any falls in the last year? No    4.  Have you noticed any problems with your balance or had difficulty walking? Yes, sometimes bc of back surgery    5.  In the last six months have you experienced any leakage of urine? No    6. DPA/Advanced Directive: Patient does not have an Advanced Directive.  A packet and workshop information was given on Advanced Directives.   Suturegard Intro: Intraoperative tissue expansion was performed, utilizing the SUTUREGARD device, in order to reduce wound tension.

## 2023-01-19 NOTE — CARE PLAN
The patient is Stable - Low risk of patient condition declining or worsening         Progress made toward(s) clinical / shift goals:  Free from falls during this shift; NPO status maintained. No pain during this shift.    Patient is not progressing towards the following goals:

## 2023-01-19 NOTE — PROGRESS NOTES
Report received from Deborah GAO. Plan of care discussed. Patient resting comfortably in bed. Safety precautions in place.   Patient reports pain at IV site. Replaced fluids.

## 2023-01-19 NOTE — PROGRESS NOTES
"Hospital Medicine Daily Progress Note    Date of Service  1/19/2023    Chief Complaint  Jewell Diaz is a 82 y.o. female admitted 1/17/2023 with abdominal pain    Hospital Course  Per notes, \" 82 y.o. female, who underwent outpatient ERCP earlier today for choledocholithiasis that was \"Unsuccessful ERCP due to location of the biliary orifice\" by Dr. Walker with the plan to re-schedule a repeat outpatient ERCP.      States that she found out about choledocholithiasis on a \"regular yearly or hypodense mass of the liver, but instead, they found choledocholithiasis and she was referred to GI.  She has h/o Cholecystectomy.  She denies having abdominal pain prior to the procedure.  Eyes prior history of pancreatitis use.     She reports that after procedure, developed acute onset of abdominal pain, epigastric 10/10 in intensity, sharp, no radiation reason why she came to the ED on 1/17/2023 for evaluation.\"    Interval Problem Update  No nausea, vomiting or epigastric pain but did have a fever overnight so we will start on IV antibiotics.    Discussed with gastroenterology and no plan for repeat ERCP inpatient.  Fever possibly due to pancreatitis, okay for patient to progress diet, which she has tolerated well throughout the day.  She will follow closely in the outpatient setting with gastroenterology.    I have discussed this patient's plan of care and discharge plan at IDT rounds today with Case Management, Nursing, Nursing leadership, and other members of the IDT team.    Consultants/Specialty  GI    Code Status  Full Code    Disposition  Patient is not medically cleared for discharge.   Anticipate discharge to to home with close outpatient follow-up.  I have placed the appropriate orders for post-discharge needs.    Review of Systems  Review of Systems   Constitutional:  Positive for malaise/fatigue.   Gastrointestinal:  Positive for abdominal pain, nausea and vomiting.   All other systems reviewed and " are negative.     Physical Exam  Temp:  [36.9 °C (98.4 °F)-38.4 °C (101.1 °F)] 36.9 °C (98.4 °F)  Pulse:  [76-98] 98  Resp:  [16-18] 18  BP: (123-154)/(61-76) 154/72  SpO2:  [90 %-97 %] 90 %    Physical Exam  Vitals and nursing note reviewed.   Constitutional:       Appearance: Normal appearance.   Cardiovascular:      Rate and Rhythm: Normal rate and regular rhythm.      Pulses: Normal pulses.      Heart sounds: Normal heart sounds.   Pulmonary:      Effort: Pulmonary effort is normal.      Breath sounds: Normal breath sounds.   Abdominal:      General: Abdomen is flat. Bowel sounds are normal.      Tenderness: There is abdominal tenderness.   Skin:     General: Skin is warm and dry.   Neurological:      General: No focal deficit present.      Mental Status: She is alert and oriented to person, place, and time. Mental status is at baseline.       Fluids    Intake/Output Summary (Last 24 hours) at 1/19/2023 1510  Last data filed at 1/19/2023 1239  Gross per 24 hour   Intake 170 ml   Output 600 ml   Net -430 ml       Laboratory  Recent Labs     01/17/23  1957 01/18/23  0415 01/19/23  0013   WBC 12.2* 8.5 11.8*   RBC 4.01* 3.65* 3.76*   HEMOGLOBIN 13.6 12.5 12.9   HEMATOCRIT 41.1 37.5 38.6   .5* 102.7* 102.7*   MCH 33.9* 34.2* 34.3*   MCHC 33.1* 33.3* 33.4*   RDW 49.1 49.1 49.9   PLATELETCT 303 214 210   MPV 9.6 9.1 9.2     Recent Labs     01/18/23  0438 01/19/23  0013 01/19/23  0440   SODIUM 139 138 139   POTASSIUM 3.5* 3.3* 3.2*   CHLORIDE 106 102 103   CO2 24 23 23   GLUCOSE 138* 109* 102*   BUN 16 10 11   CREATININE 0.62 0.51 0.63   CALCIUM 8.9 8.9 8.4                   Imaging  CT-ABDOMEN-PELVIS WITH   Final Result         1.  Mild intrahepatic and extra hepatic biliary ductal dilatation. There is radiodensity in the distal common bile duct favoring choledocholithiasis.   2.  Hazy peripancreatic fat stranding, appearance compatible with pancreatitis, likely gallstone pancreatitis.   3.  Intermediate  "density nodular cyst along the anterior margin of the liver, of uncertain significance, overall similar compared to prior study.   4.  Small hiatal hernia   5.  Segment 2 duodenal diverticula.   6.  Diverticulosis   7.  Atherosclerosis and atherosclerotic coronary artery disease      These findings were discussed with the patient's clinician, Greg Butt, on 1/17/2023 9:37 PM.           Assessment/Plan  * Choledocholithiasis- (present on admission)  Assessment & Plan  - G showing mild intrahepatic and extrahepatic biliary ductal dilation, findings favoring choledocholithiasis.    -Patient had outpatient ERCP done earlier in the day for choledocholithiasis by Dr. Walker.  Per note, the procedure was \"unsuccessful ERCP due to location of biliary orifice\".  Prior history of cholecystectomy.  -Also per GI note there are plans for re-schedule ERCP.  -GI consulting, will discuss with Dr. Gonzalez and decide on repeat ERCP.  For now patient will need IV fluids and n.p.o. for pancreatitis.    Hypomagnesemia- (present on admission)  Assessment & Plan  Continue to monitor replace as needed    Acute pancreatitis  Assessment & Plan  -Status to medical floor.  -Okay to progress diet-tolerating full diet  -Initial lipase is > 3000, findings consistent with acute pancreatitis with hazy pancreatic fat stranding.  Her ERCP in the morning for choledocholithiasis.  Prior history of cholecystectomy.  -Fluids:  mL/h.  -GI consult and recommending outpatient follow-up  -Overall improving  -Pain and Nausea control PRN  -Developed fever overnight so started on antibiotics      Hypodense mass of liver- (present on admission)  Assessment & Plan  -This was incidentally found on ultrasound of the liver in June 2021 showing a 2.3 cm solid hypoechoic mass superior to the liver and additional 2.6 cm hypoechoic mass in the upper for all posterior right hepatic lobe.    -CT scan done today showing intermediate density nodular cyst along " the anterior margin of the liver of uncertain significance overall similar to compare prior study.  -Continue to follow-up outpatient.    Leukocytosis- (present on admission)  Assessment & Plan  BBC is 12.2.  This is likely reactive.  There is no signs of infection and no other sirs criteria.  We will continue to monitor this with CBC in the morning.    HLD (hyperlipidemia)- (present on admission)  Assessment & Plan  Aches atorvastatin which I will hold for now as she is n.p.o.    Hypertension- (present on admission)  Assessment & Plan  -Patient takes amlodipine, losartan and spironolactone.  Will give as needed IV antihypertensives.  She is NPO.    Type 2 diabetes mellitus with circulatory disorder, without long-term current use of insulin (HCC)- (present on admission)  Assessment & Plan  -NPO now. RISS         VTE prophylaxis: SCDs/TEDs    I have performed a physical exam and reviewed and updated ROS and Plan today (1/19/2023). In review of yesterday's note (1/18/2023), there are no changes except as documented above.

## 2023-01-19 NOTE — PROGRESS NOTES
Paged MD Gasca regarding pt's temperature of 101.1 F. Received new orders to give Tylenol, okay with sips with meds, IV abx, and lab draw.

## 2023-01-19 NOTE — PROGRESS NOTES
Received report from dayshift RN. Pt resting in bed, family at bedside. Pt A&O x4, on RA. Pt denies pain at this time. Pt continued on IV fluids, LR at 83 ml/hr. Pt updated with POC which includes bowel rest and blood glucose checks. Call light within reach, fall precautions in place, all needs met at this time.

## 2023-01-19 NOTE — PROGRESS NOTES
4 Eyes Skin Assessment Completed by Franki, RN and Valentín RN.    Head Scab  Ears WDL  Nose WDL  Mouth WDL  Neck WDL  Breast/Chest WDL  Shoulder Blades WDL  Spine WDL  (R) Arm/Elbow/Hand WDL  (L) Arm/Elbow/Hand WDL  Abdomen WDL  Groin WDL  Scrotum/Coccyx/Buttocks WDL  (R) Leg WDL  (L) Leg WDL  (R) Heel/Foot/Toe WDL  (L) Heel/Foot/Toe WDL          Devices In Places Blood Pressure Cuff and Pulse Ox      Interventions In Place Pillows    Possible Skin Injury No    Pictures Uploaded Into Epic N/A  Wound Consult Placed N/A  RN Wound Prevention Protocol Ordered No

## 2023-01-19 NOTE — CARE PLAN
The patient is Stable - Low risk of patient condition declining or worsening    Shift Goals  Clinical Goals: Maintain NPO status; will be able to sleep comfortably.  Patient Goals: pain control; able to eat atleast    Progress made toward(s) clinical / shift goals:      Pt febrile with temp of 101.1 F at 2335; MD Gasca informed and orders received for Tylenol with sips with med and labs. Mg and K low, orders for replacement received. Pt lost IV access; Mg and K IV replacement restarted at 0530. Pt has no complaints of pain overnight; slept comfortably.     Patient is not progressing towards the following goals: N/A

## 2023-01-19 NOTE — PROGRESS NOTES
OVERNIGHT HOSPITALIST:    Paged by nursing Staff regarding temperature of 101.1.  Patient was not febrile prior.  She is admitted with acute pancreatitis and underlying choledocholithiasis is status post unsuccessful ERCP yesterday.    Adding broad-spectrum antibiotic, with Zosyn, also added lactic acid, blood cultures and requested nursing staff to do earlier blood draw, switching BMP to CMP.  I will allow patient to take oral Tylenol as she was a strict NPO.  Closely follow-up on this.

## 2023-01-20 VITALS
SYSTOLIC BLOOD PRESSURE: 157 MMHG | DIASTOLIC BLOOD PRESSURE: 84 MMHG | HEIGHT: 61 IN | RESPIRATION RATE: 18 BRPM | BODY MASS INDEX: 25.68 KG/M2 | WEIGHT: 136 LBS | OXYGEN SATURATION: 90 % | TEMPERATURE: 98.6 F | HEART RATE: 94 BPM

## 2023-01-20 LAB
ALBUMIN SERPL BCP-MCNC: 3.7 G/DL (ref 3.2–4.9)
ALBUMIN/GLOB SERPL: 1.3 G/DL
ALP SERPL-CCNC: 80 U/L (ref 30–99)
ALT SERPL-CCNC: 17 U/L (ref 2–50)
ANION GAP SERPL CALC-SCNC: 13 MMOL/L (ref 7–16)
ANION GAP SERPL CALC-SCNC: 13 MMOL/L (ref 7–16)
AST SERPL-CCNC: 26 U/L (ref 12–45)
BILIRUB SERPL-MCNC: 1.1 MG/DL (ref 0.1–1.5)
BUN SERPL-MCNC: 9 MG/DL (ref 8–22)
BUN SERPL-MCNC: 9 MG/DL (ref 8–22)
CALCIUM ALBUM COR SERPL-MCNC: 9.4 MG/DL (ref 8.5–10.5)
CALCIUM SERPL-MCNC: 8.9 MG/DL (ref 8.4–10.2)
CALCIUM SERPL-MCNC: 9.2 MG/DL (ref 8.4–10.2)
CHLORIDE SERPL-SCNC: 101 MMOL/L (ref 96–112)
CHLORIDE SERPL-SCNC: 102 MMOL/L (ref 96–112)
CO2 SERPL-SCNC: 23 MMOL/L (ref 20–33)
CO2 SERPL-SCNC: 26 MMOL/L (ref 20–33)
CREAT SERPL-MCNC: 0.54 MG/DL (ref 0.5–1.4)
CREAT SERPL-MCNC: 0.59 MG/DL (ref 0.5–1.4)
ERYTHROCYTE [DISTWIDTH] IN BLOOD BY AUTOMATED COUNT: 49 FL (ref 35.9–50)
GFR SERPLBLD CREATININE-BSD FMLA CKD-EPI: 91 ML/MIN/1.73 M 2
GFR SERPLBLD CREATININE-BSD FMLA CKD-EPI: 93 ML/MIN/1.73 M 2
GLOBULIN SER CALC-MCNC: 2.9 G/DL (ref 1.9–3.5)
GLUCOSE BLD STRIP.AUTO-MCNC: 126 MG/DL (ref 65–99)
GLUCOSE SERPL-MCNC: 131 MG/DL (ref 65–99)
GLUCOSE SERPL-MCNC: 175 MG/DL (ref 65–99)
HCT VFR BLD AUTO: 36.5 % (ref 37–47)
HGB BLD-MCNC: 12.2 G/DL (ref 12–16)
LIPASE SERPL-CCNC: 131 U/L (ref 7–58)
MAGNESIUM SERPL-MCNC: 1.6 MG/DL (ref 1.5–2.5)
MCH RBC QN AUTO: 34.3 PG (ref 27–33)
MCHC RBC AUTO-ENTMCNC: 33.4 G/DL (ref 33.6–35)
MCV RBC AUTO: 102.5 FL (ref 81.4–97.8)
PLATELET # BLD AUTO: 211 K/UL (ref 164–446)
PMV BLD AUTO: 9.4 FL (ref 9–12.9)
POTASSIUM SERPL-SCNC: 3.4 MMOL/L (ref 3.6–5.5)
POTASSIUM SERPL-SCNC: 3.5 MMOL/L (ref 3.6–5.5)
PROT SERPL-MCNC: 6.6 G/DL (ref 6–8.2)
RBC # BLD AUTO: 3.56 M/UL (ref 4.2–5.4)
SODIUM SERPL-SCNC: 137 MMOL/L (ref 135–145)
SODIUM SERPL-SCNC: 141 MMOL/L (ref 135–145)
WBC # BLD AUTO: 13.9 K/UL (ref 4.8–10.8)

## 2023-01-20 PROCEDURE — 85027 COMPLETE CBC AUTOMATED: CPT

## 2023-01-20 PROCEDURE — 83735 ASSAY OF MAGNESIUM: CPT

## 2023-01-20 PROCEDURE — 80053 COMPREHEN METABOLIC PANEL: CPT

## 2023-01-20 PROCEDURE — 99239 HOSP IP/OBS DSCHRG MGMT >30: CPT | Performed by: INTERNAL MEDICINE

## 2023-01-20 PROCEDURE — 80048 BASIC METABOLIC PNL TOTAL CA: CPT

## 2023-01-20 PROCEDURE — 82962 GLUCOSE BLOOD TEST: CPT

## 2023-01-20 PROCEDURE — 83690 ASSAY OF LIPASE: CPT

## 2023-01-20 PROCEDURE — 700111 HCHG RX REV CODE 636 W/ 250 OVERRIDE (IP): Performed by: HOSPITALIST

## 2023-01-20 PROCEDURE — 36415 COLL VENOUS BLD VENIPUNCTURE: CPT

## 2023-01-20 PROCEDURE — 700105 HCHG RX REV CODE 258: Performed by: HOSPITALIST

## 2023-01-20 RX ORDER — AMOXICILLIN AND CLAVULANATE POTASSIUM 875; 125 MG/1; MG/1
1 TABLET, FILM COATED ORAL 2 TIMES DAILY
Qty: 10 TABLET | Refills: 0 | Status: SHIPPED | OUTPATIENT
Start: 2023-01-20 | End: 2023-01-25

## 2023-01-20 RX ADMIN — PIPERACILLIN AND TAZOBACTAM 3.38 G: 3; .375 INJECTION, POWDER, LYOPHILIZED, FOR SOLUTION INTRAVENOUS; PARENTERAL at 05:08

## 2023-01-20 RX ADMIN — DORZOLAMIDE HYDROCHLORIDE AND TIMOLOL MALEATE 1 DROP: 20; 5 SOLUTION/ DROPS OPHTHALMIC at 05:05

## 2023-01-20 ASSESSMENT — PAIN DESCRIPTION - PAIN TYPE
TYPE: ACUTE PAIN
TYPE: ACUTE PAIN

## 2023-01-20 NOTE — PROGRESS NOTES
Discharging patient home per MD order.   Pt verbalized understanding of discharge instructions and educational handouts and all questions answered.  Pt discharged off unit with hospital escort.

## 2023-01-20 NOTE — DISCHARGE SUMMARY
"Discharge Summary    CHIEF COMPLAINT ON ADMISSION  Chief Complaint   Patient presents with    Post-Op Complications    Post-Op Pain     Pt had an ERCP done this am, MD informed her that they were not able to get the stones out.  She was sent home and is needing to reschedule.  However she comes in d/t increased abdominal pain and nausea.  She denies vomiting, fever, chills.         Reason for Admission  Post Op complications      Admission Date  1/17/2023    CODE STATUS  Full Code    HPI & HOSPITAL COURSE  Per notes, \" 82 y.o. female, who underwent outpatient ERCP earlier today for choledocholithiasis that was \"Unsuccessful ERCP due to location of the biliary orifice\" by Dr. Walker with the plan to re-schedule a repeat outpatient ERCP.      States that she found out about choledocholithiasis on a \"regular yearly or hypodense mass of the liver, but instead, they found choledocholithiasis and she was referred to GI.  She has h/o Cholecystectomy.  She denies having abdominal pain prior to the procedure.  Eyes prior history of pancreatitis use.     She reports that after procedure, developed acute onset of abdominal pain, epigastric 10/10 in intensity, sharp, no radiation reason why she came to the ED on 1/17/2023 for evaluation.\"    Patient was admitted and started on bowel rest and IV fluids for pancreatitis. She did have an episode of fever, but no other signs of infection. She responded well to fluids and antibiotics. Gastroenterology evaluated patient and determined it would not be necessary for patient to undergo ERCP in the hospital. She had resolution of all symptoms and at this point is able to be discharged and managed in the outpatient setting.       Therefore, she is discharged in good and stable condition to home with close outpatient follow-up.    The patient met 2-midnight criteria for an inpatient stay at the time of discharge.    Discharge Date  1/20/2023    FOLLOW UP ITEMS POST DISCHARGE  FU with PCP "     DISCHARGE DIAGNOSES  Principal Problem:    Choledocholithiasis POA: Yes  Active Problems:    Type 2 diabetes mellitus with circulatory disorder, without long-term current use of insulin (HCC) POA: Yes      Overview: Chronic condition. Onset around around age 62.      Current medication regimen: metformin 1000mg BID, Actos 15mg daily      Patient tolerating and reports compliant with medications. Does not need       refill of medications today. Denies vision changes, dry mouth, chest pain,       nausea/vomiting/diarrhea, polydipsia, polyphagia, polyuria, hypoglycemia,       numbness/tingling. She checks her feet at home.      Last eye exam: due, she has appointment next month, no DR from before      Last foot exam: due      Diet: tries to eat healthy in general      Exercise: stretching      Vaccines: flu 11/2021, PPSV23 completed 12/2008, Tdap received 04/2019          Hypertension POA: Yes      Overview: Chronic condition.      Current medication regimen: amlodipine 5mg daily, HCTZ 12.5mg daily,       losartan 100mg daily, spironolactone 25mg BID      Patient tolerating and reports compliant with medications. She does not       regularly monitor blood pressure at home. Does not need refill of       medications today. Denies headache, dizziness, vision changes, chest pain,       dyspnea, edema.          HLD (hyperlipidemia) POA: Yes      Overview: Chronic condition.      Current regimen: atorvastatin 40mg daily.       She reports compliance and tolerating medication well. Denies       musculoskeletal pain, headache, diarrhea. She does not need medication       refill today. No acute concerns at this time.          Leukocytosis POA: Yes    Hypodense mass of liver POA: Yes      Overview: Chronic condition. US liver 06/2021 showed 2.3 cm solid hypoechoic mass       superior to the liver. Additional 2.6 cm hypoechoic mass in the peripheral       posterior right hepatic lobe with punctate echogenic foci in the        periphery. The masses was seen on prior CT and are       indeterminant. Continued follow-up is recommended.    Acute pancreatitis POA: Unknown    Hypomagnesemia POA: Yes  Resolved Problems:    * No resolved hospital problems. *      FOLLOW UP  Future Appointments   Date Time Provider Department Center   7/11/2023  9:00 AM LOUIE Stanton     No follow-up provider specified.    MEDICATIONS ON DISCHARGE     Medication List        START taking these medications        Instructions   amoxicillin-clavulanate 875-125 MG Tabs  Commonly known as: AUGMENTIN   Take 1 Tablet by mouth 2 times a day for 5 days.  Dose: 1 Tablet            CHANGE how you take these medications        Instructions   hydrochlorothiazide 12.5 MG capsule  What changed: when to take this  Commonly known as: MICROZIDE   Take 1 Capsule by mouth every day.  Dose: 12.5 mg     losartan 100 MG Tabs  What changed: when to take this  Commonly known as: COZAAR   Take 1 Tablet by mouth every day.  Dose: 100 mg     metformin 1000 MG tablet  What changed:   how much to take  how to take this  when to take this  Commonly known as: GLUCOPHAGE   metformin 1,000 mg tablet            CONTINUE taking these medications        Instructions   amLODIPine 5 MG Tabs  Commonly known as: NORVASC   Take 1 Tablet by mouth every evening for 90 days.  Dose: 5 mg     atorvastatin 40 MG Tabs  Commonly known as: LIPITOR   Take 1 Tablet by mouth every evening.  Dose: 40 mg     CALCIUM PO   Take 1 Tablet by mouth 2 times a day.  Dose: 1 Tablet     D3 PO   Take 1 Capsule by mouth every day.  Dose: 1 Capsule     dorzolamide-timolol 22.3-6.8 MG/ML Soln  Commonly known as: COSOPT   Administer 1 Drop into both eyes 2 times a day.  Dose: 1 Drop     folic acid 1 MG Tabs  Commonly known as: FOLVITE   Take 1 mg by mouth every day.  Dose: 1 mg     glucose blood strip   Doctor's comments: Contour next test strips  1 Strip by Other route every day.  Dose: 1 Each     IRON PO    Take 1 Tablet by mouth every day. OTC  Dose: 1 Tablet     methotrexate 2.5 MG tablet   Take 10 mg by mouth every 7 days. 4 tablets on Thursday  Dose: 10 mg     multivitamin Tabs   Take 1 Tab by mouth every day.  Dose: 1 Tablet     mupirocin 2 % Oint  Commonly known as: BACTROBAN   Apply 1 Application topically 2 times a day as needed (Apply's on right side of her face).  Dose: 1 Application     omeprazole 20 MG delayed-release capsule  Commonly known as: PRILOSEC   Take 20 mg by mouth every morning.  Dose: 20 mg     pioglitazone 15 MG Tabs  Commonly known as: ACTOS   Take 1 Tablet by mouth every day.  Dose: 15 mg     spironolactone 25 MG Tabs  Commonly known as: ALDACTONE   Take 1 Tablet by mouth 2 times a day.  Dose: 25 mg     TYLENOL PM EXTRA STRENGTH PO   Take 2 Tablets by mouth at bedtime.  Dose: 2 Tablet              Allergies  Allergies   Allergen Reactions    Nkda [No Known Drug Allergy]        DIET  Orders Placed This Encounter   Procedures    Diet Order Diet: Consistent CHO (Diabetic); Nutrient modifications: (optional): Low Fat     Standing Status:   Standing     Number of Occurrences:   1     Order Specific Question:   Diet:     Answer:   Consistent CHO (Diabetic) [4]     Order Specific Question:   Nutrient modifications: (optional)     Answer:   Low Fat [5]       ACTIVITY  As tolerated.  Weight bearing as tolerated    CONSULTATIONS  GI    PROCEDURES  None    LABORATORY  Lab Results   Component Value Date    SODIUM 141 01/20/2023    POTASSIUM 3.4 (L) 01/20/2023    CHLORIDE 102 01/20/2023    CO2 26 01/20/2023    GLUCOSE 175 (H) 01/20/2023    BUN 9 01/20/2023    CREATININE 0.59 01/20/2023        Lab Results   Component Value Date    WBC 13.9 (H) 01/20/2023    HEMOGLOBIN 12.2 01/20/2023    HEMATOCRIT 36.5 (L) 01/20/2023    PLATELETCT 211 01/20/2023        Total time of the discharge process exceeds 38 minutes.

## 2023-01-20 NOTE — PROGRESS NOTES
Received report from day shift nurse. Assumed pt care at 1915. Pt is A&Ox4, resting comfortably in bed. Pt on r.a.. No signs of SOB/respiratory distress. Labs noted, VSS. Pt c/o no pain at this moment. Fall precautions in place. Bed at lowest position. Call light and personal belongings within reach. Continue to monitor

## 2023-01-20 NOTE — CARE PLAN
The patient is Stable - Low risk of patient condition declining or worsening    Shift Goals  Clinical Goals: ERCP  Patient Goals: rest, monitor pain    Progress made toward(s) clinical / shift goals:       Problem: Knowledge Deficit - Standard  Goal: Patient and family/care givers will demonstrate understanding of plan of care, disease process/condition, diagnostic tests and medications  Outcome: Progressing     Problem: Fall Risk  Goal: Patient will remain free from falls  Outcome: Progressing     Problem: Communication  Goal: The ability to communicate needs accurately and effectively will improve  Outcome: Progressing     Problem: Infection - Standard  Goal: Patient will remain free from infection  Outcome: Progressing

## 2023-01-20 NOTE — PROGRESS NOTES
Gastroenterology Progress Note         Author: Ashwini Overton D.O.                                 Date & Time Created: 1/20/2023 10:16 AM         Chief Complaint: Post-ERCP pancreatitis    Interval History:  This is an 82-year old female with post ERCP pancreatitis.  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  Patient is doing much better today.  She reports no abdominal pain, nausea, vomiting or GI bleed.  She is tolerating her diet.    Review of Systems:  General: No fevers, chills, unintentional, weight loss.  HEENT: No scleral icterus, gum bleed, dysphagia, odynophagia.  Cardiology: No chest pain, palpitations, orthopnea.  Respiratory: No dyspnea, cough, wheezing.  Gastrointestinal: No abdominal pain, nausea, vomiting, changes in bowel habits, rectal bleed.  Genitourinary: No hematuria, dysuria, urgency.  Neurologic: No changes in memory, confusion, gait instability.  Skin: No ecchymosis, jaundice, telangiectasia.    Physical Exam:  Vitals:    01/19/23 2306 01/20/23 0508 01/20/23 0935 01/20/23 0958   BP: 132/68 135/76 (!) 179/92 (!) (P) 157/84   Pulse: 81 88 94    Resp: 18 18 18    Temp: 36.6 °C (97.8 °F) 37.1 °C (98.7 °F) 37 °C (98.6 °F)    TempSrc: Oral Oral Oral    SpO2: 91% 91% 90%    Weight:       Height:         General: No acute cardiopulmonary distress.  Head: Normocephalic.  EENT: Scleral anicterus. Mucosa moist.   Respiratory: Breath sounds present. Symmetrical rise of anterior thorax.  Cardiovascular: Normal S1, S2.  Gastrointestinal: Soft, nontender, nondistended. Normoactive bowel sounds. No guarding.  Neurologic: Alert and oriented.  Skin: Warm and dry.    Labs:              Recent Labs     01/19/23  0013 01/19/23  0440 01/20/23  0119 01/20/23  0842   SODIUM 138 139 137 141   POTASSIUM 3.3* 3.2* 3.5* 3.4*   CHLORIDE 102 103 101 102   CO2 23 23 23 26   BUN 10 11 9 9   CREATININE 0.51 0.63 0.54 0.59   MAGNESIUM 1.0*  --  1.6   --    CALCIUM 8.9 8.4 8.9 9.2       Recent Labs     23  0800 23  0013 23  0440 23  01123  0842   ALTSGPT  --  14 13  --  17   ASTSGOT  --  22 19  --  26   ALKPHOSPHAT  --  68 61  --  80   TBILIRUBIN  --  0.9 1.3  --  1.1   LIPASE >3000*  --  389*  --  131*   GLUCOSE  --  109* 102* 131* 175*       Recent Labs     23   RBC 3.65* 3.76* 3.56*   HEMOGLOBIN 12.5 12.9 12.2   HEMATOCRIT 37.5 38.6 36.5*   PLATELETCT 214 210 211       Recent Labs     23  0842   WBC 12.2* 8.5  --  11.8*  --  13.9*  --    NEUTSPOLYS 78.70*  --   --   --   --   --   --    LYMPHOCYTES 9.50*  --   --   --   --   --   --    MONOCYTES 10.60  --   --   --   --   --   --    EOSINOPHILS 0.70  --   --   --   --   --   --    BASOPHILS 0.20  --   --   --   --   --   --    ASTSGOT 27  --    < > 22 19  --  26   ALTSGPT 21  --    < > 14 13  --  17   ALKPHOSPHAT 77  --    < > 68 61  --  80   TBILIRUBIN 0.5  --    < > 0.9 1.3  --  1.1    < > = values in this interval not displayed.       Hemodynamics:    Temp (24hrs), Av.9 °C (98.4 °F), Min:36.6 °C (97.8 °F), Max:37.1 °C (98.7 °F)  Temperature: 37 °C (98.6 °F)    Pulse  Av.7  Min: 67  Max: 98     Blood Pressure : (!) (P) 157/84       Respiratory:      Respiration: 18, Pulse Oximetry: 90 %                   Fluids:      Intake/Output Summary (Last 24 hours) at 2023 1016  Last data filed at 2023 0725  Gross per 24 hour   Intake 2848.78 ml   Output --   Net 2848.78 ml            GI/Nutrition:    Orders Placed This Encounter   Procedures    Diet Order Diet: Consistent CHO (Diabetic); Nutrient modifications: (optional): Low Fat     Standing Status:   Standing     Number of Occurrences:   1     Order Specific Question:   Diet:     Answer:   Consistent CHO (Diabetic) [4]     Order Specific Question:   Nutrient modifications:  (optional)     Answer:   Low Fat [5]       Medical Decision Making, by Problem:    Active Hospital Problems    Diagnosis     *Choledocholithiasis [K80.50]     Hypomagnesemia [E83.42]     Acute pancreatitis [K85.90]     Hypodense mass of liver [R16.0]     Leukocytosis [D72.829]     HLD (hyperlipidemia) [E78.5]     Type 2 diabetes mellitus with circulatory disorder, without long-term current use of insulin (HCC) [E11.59]     Hypertension [I10]             Assessment:  Post-ERCP pancreatitis  2. Choledocholithiasis  3. Leukocytosis due to #1    Plan:  This is an 82-year-old female with post-ERCP pancreatitis and choledocholithiasis.  She had an unsuccessful ERCP due to difficult anatomy and periampullary diverticulum.  Patient is tolerating her diet and has no abdominal pain on today's exam.  No fever today.  Liver function is normal.    Will arrange outpatient ERCP with Dr. Terrazas at next available.  Risks and benefits of an ERCP were discussed with the patient and her  at bedside.  All questions and concerns addressed.    Please call GI if further assistance is needed.  We will sign off.         Quality-Core Measures    Ashwini Overton D.O.  Gastroenterology  [facility]  [phone]

## 2023-01-20 NOTE — CARE PLAN
The patient is Stable - Low risk of patient condition declining or worsening    Shift Goals  Clinical Goals: Pain within manageable level  Patient Goals: rest and sleep comfortably    Progress made toward(s) clinical / shift goals:  Patient's pain within manageable level. Patient was able to rest and sleep comfortably. Hourly rounds in progress. Call light within reach. All needs met at this time.    Patient is not progressing towards the following goals: N/A

## 2023-01-23 ENCOUNTER — PATIENT OUTREACH (OUTPATIENT)
Dept: MEDICAL GROUP | Facility: MEDICAL CENTER | Age: 83
End: 2023-01-23
Payer: MEDICARE

## 2023-01-23 PROBLEM — Z09 HOSPITAL DISCHARGE FOLLOW-UP: Status: ACTIVE | Noted: 2023-01-23

## 2023-01-23 PROBLEM — K91.89 POST-ERCP ACUTE PANCREATITIS: Status: ACTIVE | Noted: 2023-01-17

## 2023-01-23 NOTE — PROGRESS NOTES
Subjective:     Jewell Diaz is a 82 y.o. female who presents for Hospital Follow-up.    POST DISCHARGE CALL:  Discharge Date:1/20/2023   Date of Outreach Call: 1/23/2023 10:31 AM  Now that you're home, how are you doing? Fair  Did you receive any new prescriptions? Yes  Were you able to fill those medications? Yes  How did you fill those prescriptions? Pharmacy  If not able to fill prescriptions, why? *    Do you have questions about your medications? No  Do you have a follow-up appointment scheduled?Yes  Any issues or paperwork you wish to discuss with your PCP? *  Does patient qualify for CCM Program? No  If patient qualifies, was CCM Program Introduced? *  If patient does not qualify, comment? *  Number of Attempts: 1  Current or previous attempts completed within two business days of discharge? Yes  Provided education regarding treatment plan, medication, self-management, ADLs? Yes  Has patient completed Advance Directive? If yes, advise them to bring to appointment. No  Care Manager phone number provided? No  Is there anything else I can help you with? No  Chief Complaint? post-op complication, post-op pain - Pt had an ERCP done this am, MD informed her that they were not able to get the stones out.  She was sent home and is needing to reschedule.  However she comes in d/t increased abdominal pain and nausea.  She denies vomiting, fever, chills.  Admitting Dx? post-op complications  Discharge Diagnosis? choledocholithiasis  Additional Comments? *    HPI:   Recently hospitalized for     Problem   Chronic Insomnia    Chronic condition. She gets about 3-4 hours nightly.     Hospital Discharge Follow-Up    Acute condition. She was recently discharged on 1/20/23 for post-ERCP pancreatitis. Per GI, plan is to arrange outpatient ERCP with Dr. Terrazas at next available.    Per hospital discharge note:  82 y.o. female, who underwent outpatient ERCP earlier today for choledocholithiasis that was  "\"Unsuccessful ERCP due to location of the biliary orifice\" by Dr. Walker with the plan to re-schedule a repeat outpatient ERCP.      States that she found out about choledocholithiasis on a \"regular yearly or hypodense mass of the liver, but instead, they found choledocholithiasis and she was referred to GI.  She has h/o Cholecystectomy.  She denies having abdominal pain prior to the procedure.  Eyes prior history of pancreatitis use.     She reports that after procedure, developed acute onset of abdominal pain, epigastric 10/10 in intensity, sharp, no radiation reason why she came to the ED on 1/17/2023 for evaluation.\"     Patient was admitted and started on bowel rest and IV fluids for pancreatitis. She did have an episode of fever, but no other signs of infection. She responded well to fluids and antibiotics. Gastroenterology evaluated patient and determined it would not be necessary for patient to undergo ERCP in the hospital. She had resolution of all symptoms and at this point is able to be discharged and managed in the outpatient setting.      Post-Ercp Acute Pancreatitis    Acute condition.     Fatigue    Chronic condition. She has been feeling tired all the times. Vitamin B12 has been low normal in the past. Denies bloody stool.     Hypertension    Chronic condition.  Current medication regimen: amlodipine 5mg daily, HCTZ 12.5mg daily, losartan 100mg daily, spironolactone 25mg daily  Patient tolerating and reports compliant with medications. She does not regularly monitor blood pressure at home. Does not need refill of medications today. Denies headache, dizziness, vision changes, chest pain, dyspnea, edema.           Current medicines (including reconciliation performed today)  Current Outpatient Medications   Medication Sig Dispense Refill    hydrochlorothiazide (MICROZIDE) 12.5 MG capsule Take 1 Capsule by mouth every day. 100 Capsule 3    spironolactone (ALDACTONE) 25 MG Tab Take 1 Tablet by mouth " every day for 100 days. 90 Tablet 3    traZODone (DESYREL) 50 MG Tab Take 0.5-1 Tablets by mouth at bedtime for 90 days. 90 Tablet 1    amoxicillin-clavulanate (AUGMENTIN) 875-125 MG Tab Take 1 Tablet by mouth 2 times a day for 5 days. 10 Tablet 0    CALCIUM PO Take 1 Tablet by mouth 2 times a day.      Cholecalciferol (D3 PO) Take 1 Capsule by mouth every day.      amLODIPine (NORVASC) 5 MG Tab Take 1 Tablet by mouth every evening for 90 days. 90 Tablet 3    Ferrous Sulfate (IRON PO) Take 1 Tablet by mouth every day. OTC      diphenhydrAMINE-APAP, sleep, (TYLENOL PM EXTRA STRENGTH PO) Take 2 Tablets by mouth at bedtime.      metformin (GLUCOPHAGE) 1000 MG tablet metformin 1,000 mg tablet (Patient taking differently: Take 1,000 mg by mouth 2 times a day with meals. metformin 1,000 mg tablet) 180 Tablet 3    pioglitazone (ACTOS) 15 MG Tab Take 1 Tablet by mouth every day. 90 Tablet 3    atorvastatin (LIPITOR) 40 MG Tab Take 1 Tablet by mouth every evening. 100 Tablet 3    losartan (COZAAR) 100 MG Tab Take 1 Tablet by mouth every day. (Patient taking differently: Take 100 mg by mouth every morning.) 90 Tablet 3    glucose blood strip 1 Strip by Other route every day. 100 Strip 3    dorzolamide-timolol (COSOPT) 22.3-6.8 MG/ML Solution Administer 1 Drop into both eyes 2 times a day.      methotrexate 2.5 MG tablet Take 10 mg by mouth every 7 days. 4 tablets on Thursday      folic acid (FOLVITE) 1 MG Tab Take 1 mg by mouth every day.      multivitamin (THERAGRAN) TABS Take 1 Tab by mouth every day.      omeprazole (PRILOSEC) 20 MG CPDR Take 20 mg by mouth every morning.       Current Facility-Administered Medications   Medication Dose Route Frequency Provider Last Rate Last Admin    cyanocobalamin (VITAMIN B-12) injection 1,000 mcg  1,000 mcg Intramuscular Once Philippe Krishna D.O.           Allergies:   Nkda [no known drug allergy]    Social History     Tobacco Use    Smoking status: Never    Smokeless tobacco: Never  "  Vaping Use    Vaping Use: Never used   Substance Use Topics    Alcohol use: No     Alcohol/week: 0.0 oz    Drug use: No       ROS:  Gen: no fevers/chills, no changes in weight, + fatigue  Pulm: no sob, no cough  CV: no chest pain, no palpitations  GI: no nausea/vomiting, no diarrhea  : no dysuria  MSk: no myalgias  Skin: no rash  Neuro: no headaches, + insomnia    Objective:     Vitals:    01/24/23 0842   BP: 138/60   BP Location: Left arm   Patient Position: Sitting   BP Cuff Size: Adult   Pulse: 75   Resp: 16   Temp: 36.2 °C (97.1 °F)   TempSrc: Temporal   SpO2: 93%   Weight: 62 kg (136 lb 11 oz)   Height: 1.549 m (5' 0.98\")     Body mass index is 25.84 kg/m².    Physical Exam:  General: Normal appearing. No distress.  Pulmonary: Clear to ausculation. Normal effort. No rales, ronchi, or wheezing.  Cardiovascular: Regular rate and rhythm without murmur.  Abdomen: Soft, nontender, nondistended. Normal bowel sounds.  Neurologic: Grossly nonfocal  Skin: Warm and dry.  Musculoskeletal: Normal gait. No extremity cyanosis, clubbing, or edema.  Psych: Normal mood and affect. Alert and oriented x3. Judgment and insight is normal.    Assessment and Plan:   1. Hospital discharge follow-up  - Chart and discharge summary were reviewed.   - Hospitalization and results reviewed with patient.   - Medications reviewed including instructions regarding high risk medications, dosing and side effects.  - Recommended Services: No services needed at this time  - Advance directive/POLST on file?  Yes    2. Post-ERCP acute pancreatitis  Acute condition, resolving.  - slow return to normal diet as tolerated    3. Chronic insomnia  Chronic condition, persistent.  - plan to trial trazodone per order  - traZODone (DESYREL) 50 MG Tab; Take 0.5-1 Tablets by mouth at bedtime for 90 days.  Dispense: 90 Tablet; Refill: 1    4. Primary hypertension  Chronic condition, stable.  - cont current regimen: amlodipine 5mg daily, HCTZ 12.5mg daily, " losartan 100mg daily, spironolactone 25mg daily  - hydrochlorothiazide (MICROZIDE) 12.5 MG capsule; Take 1 Capsule by mouth every day.  Dispense: 100 Capsule; Refill: 3  - spironolactone (ALDACTONE) 25 MG Tab; Take 1 Tablet by mouth every day for 100 days.  Dispense: 90 Tablet; Refill: 3    5. Fatigue, unspecified type  Chronic condition, persistent. Likely multifactorial. Plan to trial B12 injection.  - cyanocobalamin (VITAMIN B-12) injection 1,000 mcg      Follow-up:Return in about 6 months (around 7/24/2023) for chronic medical conditions.    Face-to-face transitional care management services with HIGH (today's visit is within days post discharge & LACE+ score 59+) medical decision complexity were provided.     LACE+ Historical Score Over Time (0-28: Low, 29-58: Medium, 59+: High): 75

## 2023-01-24 ENCOUNTER — OFFICE VISIT (OUTPATIENT)
Dept: MEDICAL GROUP | Facility: MEDICAL CENTER | Age: 83
End: 2023-01-24
Payer: MEDICARE

## 2023-01-24 VITALS
TEMPERATURE: 97.1 F | SYSTOLIC BLOOD PRESSURE: 138 MMHG | HEIGHT: 61 IN | WEIGHT: 136.69 LBS | HEART RATE: 75 BPM | BODY MASS INDEX: 25.81 KG/M2 | DIASTOLIC BLOOD PRESSURE: 60 MMHG | RESPIRATION RATE: 16 BRPM | OXYGEN SATURATION: 93 %

## 2023-01-24 DIAGNOSIS — Z09 HOSPITAL DISCHARGE FOLLOW-UP: ICD-10-CM

## 2023-01-24 DIAGNOSIS — I10 PRIMARY HYPERTENSION: ICD-10-CM

## 2023-01-24 DIAGNOSIS — F51.04 CHRONIC INSOMNIA: ICD-10-CM

## 2023-01-24 DIAGNOSIS — K85.90 POST-ERCP ACUTE PANCREATITIS: ICD-10-CM

## 2023-01-24 DIAGNOSIS — R53.83 FATIGUE, UNSPECIFIED TYPE: ICD-10-CM

## 2023-01-24 DIAGNOSIS — K91.89 POST-ERCP ACUTE PANCREATITIS: ICD-10-CM

## 2023-01-24 LAB
BACTERIA BLD CULT: NORMAL
BACTERIA BLD CULT: NORMAL
SIGNIFICANT IND 70042: NORMAL
SIGNIFICANT IND 70042: NORMAL
SITE SITE: NORMAL
SITE SITE: NORMAL
SOURCE SOURCE: NORMAL
SOURCE SOURCE: NORMAL

## 2023-01-24 PROCEDURE — 99496 TRANSJ CARE MGMT HIGH F2F 7D: CPT | Performed by: STUDENT IN AN ORGANIZED HEALTH CARE EDUCATION/TRAINING PROGRAM

## 2023-01-24 RX ORDER — HYDROCHLOROTHIAZIDE 12.5 MG/1
12.5 CAPSULE, GELATIN COATED ORAL DAILY
Qty: 100 CAPSULE | Refills: 3 | Status: SHIPPED | OUTPATIENT
Start: 2023-01-24

## 2023-01-24 RX ORDER — CYANOCOBALAMIN 1000 UG/ML
1000 INJECTION, SOLUTION INTRAMUSCULAR; SUBCUTANEOUS ONCE
Status: COMPLETED | OUTPATIENT
Start: 2023-01-24 | End: 2023-01-24

## 2023-01-24 RX ORDER — SPIRONOLACTONE 25 MG/1
25 TABLET ORAL DAILY
Qty: 90 TABLET | Refills: 3 | Status: SHIPPED | OUTPATIENT
Start: 2023-01-24 | End: 2023-05-04

## 2023-01-24 RX ORDER — TRAZODONE HYDROCHLORIDE 50 MG/1
25-50 TABLET ORAL
Qty: 90 TABLET | Refills: 1 | Status: SHIPPED | OUTPATIENT
Start: 2023-01-24 | End: 2023-04-24

## 2023-01-24 RX ADMIN — CYANOCOBALAMIN 1000 MCG: 1000 INJECTION, SOLUTION INTRAMUSCULAR; SUBCUTANEOUS at 09:34

## 2023-01-24 ASSESSMENT — FIBROSIS 4 INDEX: FIB4 SCORE: 2.39

## 2023-01-31 ENCOUNTER — APPOINTMENT (OUTPATIENT)
Dept: ADMISSIONS | Facility: MEDICAL CENTER | Age: 83
End: 2023-01-31
Payer: MEDICARE

## 2023-02-02 ENCOUNTER — ANESTHESIA EVENT (OUTPATIENT)
Dept: SURGERY | Facility: MEDICAL CENTER | Age: 83
End: 2023-02-02
Payer: MEDICARE

## 2023-02-02 ENCOUNTER — HOSPITAL ENCOUNTER (OUTPATIENT)
Facility: MEDICAL CENTER | Age: 83
End: 2023-02-02
Attending: INTERNAL MEDICINE | Admitting: INTERNAL MEDICINE
Payer: MEDICARE

## 2023-02-02 ENCOUNTER — APPOINTMENT (OUTPATIENT)
Dept: RADIOLOGY | Facility: MEDICAL CENTER | Age: 83
End: 2023-02-02
Attending: INTERNAL MEDICINE
Payer: MEDICARE

## 2023-02-02 ENCOUNTER — ANESTHESIA (OUTPATIENT)
Dept: SURGERY | Facility: MEDICAL CENTER | Age: 83
End: 2023-02-02
Payer: MEDICARE

## 2023-02-02 VITALS
WEIGHT: 131.39 LBS | TEMPERATURE: 97.3 F | DIASTOLIC BLOOD PRESSURE: 65 MMHG | OXYGEN SATURATION: 94 % | RESPIRATION RATE: 14 BRPM | HEART RATE: 66 BPM | HEIGHT: 60 IN | BODY MASS INDEX: 25.8 KG/M2 | SYSTOLIC BLOOD PRESSURE: 130 MMHG

## 2023-02-02 LAB — GLUCOSE BLD STRIP.AUTO-MCNC: 145 MG/DL (ref 65–99)

## 2023-02-02 PROCEDURE — 00732 ANES UPR GI NDSC PX ERCP: CPT | Performed by: STUDENT IN AN ORGANIZED HEALTH CARE EDUCATION/TRAINING PROGRAM

## 2023-02-02 PROCEDURE — 160046 HCHG PACU - 1ST 60 MINS PHASE II: Performed by: INTERNAL MEDICINE

## 2023-02-02 PROCEDURE — 160025 RECOVERY II MINUTES (STATS): Performed by: INTERNAL MEDICINE

## 2023-02-02 PROCEDURE — 160208 HCHG ENDO MINUTES - EA ADDL 1 MIN LEVEL 4: Performed by: INTERNAL MEDICINE

## 2023-02-02 PROCEDURE — 160203 HCHG ENDO MINUTES - 1ST 30 MINS LEVEL 4: Performed by: INTERNAL MEDICINE

## 2023-02-02 PROCEDURE — 502240 HCHG MISC OR SUPPLY RC 0272: Performed by: INTERNAL MEDICINE

## 2023-02-02 PROCEDURE — 700101 HCHG RX REV CODE 250: Performed by: STUDENT IN AN ORGANIZED HEALTH CARE EDUCATION/TRAINING PROGRAM

## 2023-02-02 PROCEDURE — C1769 GUIDE WIRE: HCPCS | Performed by: INTERNAL MEDICINE

## 2023-02-02 PROCEDURE — C1889 IMPLANT/INSERT DEVICE, NOC: HCPCS | Performed by: INTERNAL MEDICINE

## 2023-02-02 PROCEDURE — 160035 HCHG PACU - 1ST 60 MINS PHASE I: Performed by: INTERNAL MEDICINE

## 2023-02-02 PROCEDURE — 700105 HCHG RX REV CODE 258: Performed by: INTERNAL MEDICINE

## 2023-02-02 PROCEDURE — 160047 HCHG PACU  - EA ADDL 30 MINS PHASE II: Performed by: INTERNAL MEDICINE

## 2023-02-02 PROCEDURE — 160009 HCHG ANES TIME/MIN: Performed by: INTERNAL MEDICINE

## 2023-02-02 PROCEDURE — 700111 HCHG RX REV CODE 636 W/ 250 OVERRIDE (IP): Performed by: STUDENT IN AN ORGANIZED HEALTH CARE EDUCATION/TRAINING PROGRAM

## 2023-02-02 PROCEDURE — 160002 HCHG RECOVERY MINUTES (STAT): Performed by: INTERNAL MEDICINE

## 2023-02-02 PROCEDURE — 700101 HCHG RX REV CODE 250: Performed by: INTERNAL MEDICINE

## 2023-02-02 PROCEDURE — 99100 ANES PT EXTEME AGE<1 YR&>70: CPT | Performed by: STUDENT IN AN ORGANIZED HEALTH CARE EDUCATION/TRAINING PROGRAM

## 2023-02-02 PROCEDURE — 160036 HCHG PACU - EA ADDL 30 MINS PHASE I: Performed by: INTERNAL MEDICINE

## 2023-02-02 PROCEDURE — 82962 GLUCOSE BLOOD TEST: CPT

## 2023-02-02 PROCEDURE — 160048 HCHG OR STATISTICAL LEVEL 1-5: Performed by: INTERNAL MEDICINE

## 2023-02-02 RX ORDER — ONDANSETRON 2 MG/ML
INJECTION INTRAMUSCULAR; INTRAVENOUS PRN
Status: DISCONTINUED | OUTPATIENT
Start: 2023-02-02 | End: 2023-02-02 | Stop reason: SURG

## 2023-02-02 RX ORDER — ONDANSETRON 2 MG/ML
4 INJECTION INTRAMUSCULAR; INTRAVENOUS
Status: DISCONTINUED | OUTPATIENT
Start: 2023-02-02 | End: 2023-02-02 | Stop reason: HOSPADM

## 2023-02-02 RX ORDER — PHENYLEPHRINE HCL IN 0.9% NACL 0.5 MG/5ML
SYRINGE (ML) INTRAVENOUS PRN
Status: DISCONTINUED | OUTPATIENT
Start: 2023-02-02 | End: 2023-02-02 | Stop reason: SURG

## 2023-02-02 RX ORDER — HALOPERIDOL 5 MG/ML
1 INJECTION INTRAMUSCULAR
Status: DISCONTINUED | OUTPATIENT
Start: 2023-02-02 | End: 2023-02-02 | Stop reason: HOSPADM

## 2023-02-02 RX ORDER — SODIUM CHLORIDE, SODIUM LACTATE, POTASSIUM CHLORIDE, CALCIUM CHLORIDE 600; 310; 30; 20 MG/100ML; MG/100ML; MG/100ML; MG/100ML
INJECTION, SOLUTION INTRAVENOUS CONTINUOUS
Status: ACTIVE | OUTPATIENT
Start: 2023-02-02 | End: 2023-02-02

## 2023-02-02 RX ORDER — SODIUM CHLORIDE, SODIUM LACTATE, POTASSIUM CHLORIDE, CALCIUM CHLORIDE 600; 310; 30; 20 MG/100ML; MG/100ML; MG/100ML; MG/100ML
INJECTION, SOLUTION INTRAVENOUS CONTINUOUS
Status: DISCONTINUED | OUTPATIENT
Start: 2023-02-02 | End: 2023-02-02 | Stop reason: HOSPADM

## 2023-02-02 RX ORDER — LIDOCAINE HYDROCHLORIDE 20 MG/ML
INJECTION, SOLUTION EPIDURAL; INFILTRATION; INTRACAUDAL; PERINEURAL PRN
Status: DISCONTINUED | OUTPATIENT
Start: 2023-02-02 | End: 2023-02-02 | Stop reason: SURG

## 2023-02-02 RX ADMIN — Medication 100 MCG: at 10:23

## 2023-02-02 RX ADMIN — SODIUM CHLORIDE, POTASSIUM CHLORIDE, SODIUM LACTATE AND CALCIUM CHLORIDE: 600; 310; 30; 20 INJECTION, SOLUTION INTRAVENOUS at 09:24

## 2023-02-02 RX ADMIN — ONDANSETRON 4 MG: 2 INJECTION INTRAMUSCULAR; INTRAVENOUS at 11:28

## 2023-02-02 RX ADMIN — ROCURONIUM BROMIDE 40 MG: 10 INJECTION, SOLUTION INTRAVENOUS at 10:09

## 2023-02-02 RX ADMIN — LIDOCAINE HYDROCHLORIDE 0.5 ML: 10 INJECTION, SOLUTION EPIDURAL; INFILTRATION; INTRACAUDAL; PERINEURAL at 09:24

## 2023-02-02 RX ADMIN — PROPOFOL 100 MG: 10 INJECTION, EMULSION INTRAVENOUS at 10:09

## 2023-02-02 RX ADMIN — SUGAMMADEX 200 MG: 100 INJECTION, SOLUTION INTRAVENOUS at 11:28

## 2023-02-02 RX ADMIN — LIDOCAINE HYDROCHLORIDE 60 MG: 20 INJECTION, SOLUTION EPIDURAL; INFILTRATION; INTRACAUDAL at 10:09

## 2023-02-02 ASSESSMENT — FIBROSIS 4 INDEX: FIB4 SCORE: 2.39

## 2023-02-02 NOTE — ANESTHESIA PROCEDURE NOTES
Airway    Date/Time: 2/2/2023 10:11 AM  Performed by: Camilo Ramirez M.D.  Authorized by: Camilo Ramirez M.D.     Location:  OR  Urgency:  Elective  Indications for Airway Management:  Anesthesia      Spontaneous Ventilation: absent    Sedation Level:  Deep  Preoxygenated: Yes    Patient Position:  Sniffing  Final Airway Type:  Endotracheal airway  Final Endotracheal Airway:  ETT  Cuffed: Yes    Technique Used for Successful ETT Placement:  Direct laryngoscopy    Insertion Site:  Oral  Blade Type:  Shar  Laryngoscope Blade/Videolaryngoscope Blade Size:  3  ETT Size (mm):  6.5  Measured from:  Teeth  ETT to Teeth (cm):  20  Placement Verified by: auscultation and capnometry    Cormack-Lehane Classification:  Grade IIb - view of arytenoids or posterior of glottis only  Number of Attempts at Approach:  1

## 2023-02-02 NOTE — OR NURSING
1318: Patient arrived from PACU via gurney.  No s/s bleeding.    Sedation/Resp Status: Alert.  Respirations spontaneous and non-labored.    Sats in 80s on RA on arrival, placed on 1L 02 via NC to keep Sats in 90s.     1325: Family arrived to bedside.    1420: Patient education completed, family denies further questions. Sats in 90s on RA.     1423: DC'd to care of family post uneventful stay in PACU 2.

## 2023-02-02 NOTE — PROCEDURES
DATE OF PROCEDURE:  02/02/2023     PROCEDURES PERFORMED:  1.  Endoscopic ultrasound of the pancreaticobiliary system.  2.  Attempted endoscopic retrograde cholangiography utilizing an Olympus   sphincterotome, [employer] sphincterotome, cholangioscope, straight tip   and angle tip 0.035 wires with inability to access the bile duct.     PHYSICIAN:  Mickey Terrazas MD     ANESTHESIOLOGIST:  Camilo Ramirez MD     MEDICATION:  General anesthesia.     INDICATIONS:  Procedure, risks and benefits reviewed with the patient and the   patient's son and the patient's .  Risks including but not limited to   bleeding, perforation, side effects of medication were all informed.    Additional risks inherent to ERCP that being mild, moderate, or severe   pancreatitis that could lead to postprocedural pain, prolonged   hospitalization, intensive care unit stay, and/or death reviewed with the   patient's son and , who voiced understanding.  Additionally, inability   to perform ERCP due to access was also reviewed and the patient's son and    voiced understanding and agreed to proceed.     PROCEDURE:  Esophagogastroduodenoscopy:  The patient was placed in a left   lateral decubitus position.  After intubation and sedation, a forward viewing   gastroscope was passed carefully and easily under direct visualization, the   esophagus, visualized a normal esophageal, gastric, and duodenal views.  Scope   was removed.     ENDOSCOPIC ULTRASOUND:  A side-viewing radial echoendoscope was passed   carefully and easily under indirect visualization and passed to the duodenal   station where the common bile duct was visualized.  There was a filling defect   in the mid common bile duct with post acoustic shadowing without any other   abnormalities appreciated.  Examination of the ampulla, that being the   convergence of the common bile duct and pancreatic duct appeared within normal   limits.  The ampulla was visualized to  be within a large amount of duodenal   diverticulum.  Scope was removed.     ENDOSCOPIC RETROGRADE CHOLANGIOGRAPHY:  A side-viewing duodenoscope was passed   carefully and easily under indirect visualization into the esophagus, passed   to the duodenal station.  Second portion of the duodenum, brought in a   shortened position.  A large amount of duodenal diverticulum was appreciated   at the 3-4 o'clock position.  The ampulla was visualized.  The ampullary   orifice; however, was pointing sideways, it could not be visualized en face. A   number of different techniques and approaches were utilized that all left   with unsuccessful ability to intubate the bile duct.  A [employer]   sphincterotome with a 0.035 wire straight and angle tipped, [employer]   adjustable tipped sphincterotome with both the 0.035 wire straight and angled   tip, utilizing direct cholangioscopy with a 0.035 wire straight tip attempts   at utilizing various forms of clips to bring forward the ampulla to bring   visualization of the orifice into view was also unsuccessful.  The stomach was   suctioned, desufflated and scope was removed.     COMPLICATIONS:  None.     ESTIMATED BLOOD LOSS:  None.     SPECIMENS:  None.     RECOMMENDATIONS:  This is the second attempt, the first my partner, Dr. Walker) and today's attempt with 2 different ____ as well as a cholangioscope   were unsuccessful in intubating the ampulla.  Consideration for referral to a   tertiary care center for endoscopic ultrasound-guided rendezvous versus a   percutaneous wire placement by interventional radiology will be reviewed   thoroughly.  The patient and family will be brought back to clinic to review   the results as well as options for further care and treatment.  The above   findings were reviewed with the patient's  over the phone as well as   the patient's son.        ______________________________  MD DARREN Brantley/KI/YOLIE    DD:  02/02/2023  11:48  DT:  02/02/2023 12:34    Job#:  [id number]

## 2023-02-02 NOTE — ANESTHESIA POSTPROCEDURE EVALUATION
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 Responsible Provider: Camilo Ramirez M.D.    Anesthesia Type: general ASA Status: 2          Final Anesthesia Type: general  Last vitals  BP   Blood Pressure : (!) 141/67    Temp   36.1 °C (97 °F)    Pulse   73   Resp   16    SpO2   100 %      Anesthesia Post Evaluation    No notable events documented.     Nurse Pain Score: 0 (NPRS)

## 2023-02-02 NOTE — ANESTHESIA TIME REPORT
Anesthesia Start and Stop Event Times     Date Time Event    2/2/2023 0949 Ready for Procedure     0959 Anesthesia Start     1139 Anesthesia Stop        Responsible Staff  02/02/23    Name Role Begin End    Camilo Ramirez M.D. Anesth [id number]        Overtime Reason:  no overtime (within assigned shift)    Comments:

## 2023-02-02 NOTE — ANESTHESIA PREPROCEDURE EVALUATION
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    Surgeons: Mickey Terrazas M.D.          Relevant Problems   CARDIAC   (positive) Atherosclerosis of aorta (HCC)   (positive) Hypertension      ENDO   (positive) Type 2 diabetes mellitus with circulatory disorder, without long-term current use of insulin (HCC)      Other   (positive) Rheumatoid arthritis involving multiple joints (HCC)       Physical Exam    Airway   Mallampati: II  TM distance: >3 FB  Neck ROM: full       Cardiovascular - normal exam  Rhythm: regular  Rate: normal     Dental - normal exam           Pulmonary - normal exam     Abdominal    Neurological - normal exam                 Anesthesia Plan    ASA 2       Plan - general       Airway plan will be ETT          Induction: intravenous    Postoperative Plan: Postoperative administration of opioids is intended.    Pertinent diagnostic labs and testing reviewed    Informed Consent:    Anesthetic plan and risks discussed with patient.    Use of blood products discussed with: patient whom consented to blood products.

## 2023-02-02 NOTE — OR NURSING
Patient allergies and NPO status verified, home medication reconciliation completed and belongings secured. Surgical site verified with patient. Patient verbalizes understanding of pain scale, expected course of stay and plan of care; patient and family state verbal understanding at this time. IV access established.

## 2023-02-02 NOTE — OR NURSING
1137: Pt arrived to PACU. Respirations even and unlabored. VSS.    1152: Respirations even and unlabored, pain tolerable and sleeping    1207: Respirations even and unlabored, pain 0/10 and tolerable, denies nausea, tolerating water     1222: Incentive spirometer given with teaching, respirations even and unlabored, denies pain/nausea,  updated on plan of care    1237: Patient was assisted with getting dressed and ambulating to bathroom, respirations even and unlabored, denies pain/nausea, son updated on plan of care    1252: sitting up in chair and continues to use the incentive spirometer, denies pain/nausea, respirations even and unlabored    1307: no change in status, respirations even and unlabored    1318: meets criteria for stage II, Report given to Ama GAO, transported to stage II

## 2023-02-02 NOTE — OR NURSING
1318- Patient arrived to phase 2. Patient changed out of surgical gown into clothes. Patient is A&Ox4. Patient reports no pain and no nausea. Vital signs taken and patient assessed.     1325- Patient assisted to bathroom to void.    1342- Handoff report given to Kaye GAO

## 2023-02-02 NOTE — DISCHARGE INSTRUCTIONS
If any questions arise, call your provider.  If your provider is not available, please feel free to call the Surgical Center at [phone].    MEDICATIONS: Resume taking daily medication.  Take prescribed pain medication with food.  If no medication is prescribed, you may take non-aspirin pain medication if needed.  PAIN MEDICATION CAN BE VERY CONSTIPATING.  Take a stool softener or laxative such as senokot, pericolace, or milk of magnesia if needed.    Last pain medication given at NONE GIVEN    What to Expect Post Anesthesia    Rest and take it easy for the first 24 hours.  A responsible adult is recommended to remain with you during that time.  It is normal to feel sleepy.  We encourage you to not do anything that requires balance, judgment or coordination.    FOR 24 HOURS DO NOT:  Drive, operate machinery or run household appliances.  Drink beer or alcoholic beverages.  Make important decisions or sign legal documents.    To avoid nausea, slowly advance diet as tolerated, avoiding spicy or greasy foods for the first day.  Add more substantial food to your diet according to your provider's instructions. INCREASE FLUIDS AND FIBER TO AVOID CONSTIPATION.    MILD FLU-LIKE SYMPTOMS ARE NORMAL.  YOU MAY EXPERIENCE GENERALIZED MUSCLE ACHES, THROAT IRRITATION, HEADACHE AND/OR SOME NAUSEA.    Endoscopic Retrograde Cholangiopancreatogram, Care After  This sheet gives you information about how to care for yourself after your procedure. Your health care provider may also give you more specific instructions. If you have problems or questions, contact your health care provider.  What can I expect after the procedure?  After the procedure, it is common to have:  Soreness in your throat.  Nausea.  Bloating.  Dizziness.  Tiredness (fatigue).  Follow these instructions at home:  Take over-the-counter and prescription medicines only as told by your health care provider.  Do not drive for 24 hours if you were given a medicine to  help you relax (sedative) during your procedure. Have someone stay with you for 24 hours after the procedure.  Return to your normal activities as told by your health care provider. Ask your health care provider what activities are safe for you.  Return to eating what you normally do as soon as you feel well enough or as told by your health care provider.  Keep all follow-up visits as told by your health care provider. This is important.  Contact a health care provider if:  You have pain in your abdomen that does not get better with medicine.  You develop signs of infection, such as:  Chills.  Feeling unwell.  Get help right away if:  You have difficulty swallowing.  You have worsening pain in your throat, chest, or abdomen.  You vomit bright red blood or a substance that looks like coffee grounds.  You have bloody or very black stools.  You have a fever.  You have a sudden increase in swelling (bloating) in your abdomen.  Summary  After the procedure, it is common to feel tired and to have some discomfort in your throat.  Contact your health care provider if you have signs of infection--such as chills or feeling unwell--or if you have pain that does not improve with medicine.  Get help right away if you have trouble swallowing, worsening pain, bloody or black vomit, bloody or black stools, a fever, or increased swelling in your abdomen.  Keep all follow-up visits as told by your health care provider. This is important.  This information is not intended to replace advice given to you by your health care provider. Make sure you discuss any questions you have with your health care provider.  Document Released: 10/08/2014 Document Revised: 11/30/2018 Document Reviewed: 11/06/2017  Elsevier Patient Education © 2020 [employer]

## 2023-02-03 ENCOUNTER — APPOINTMENT (OUTPATIENT)
Dept: MEDICAL GROUP | Facility: MEDICAL CENTER | Age: 83
End: 2023-02-03
Payer: MEDICARE

## 2023-02-13 PROBLEM — E11.69 HYPERLIPIDEMIA ASSOCIATED WITH TYPE 2 DIABETES MELLITUS (HCC): Status: ACTIVE | Noted: 2023-02-13

## 2023-02-13 PROBLEM — I15.2 HYPERTENSION ASSOCIATED WITH DIABETES (HCC): Status: ACTIVE | Noted: 2023-02-13

## 2023-02-13 PROBLEM — I73.9 PERIPHERAL VASCULAR DISEASE, UNSPECIFIED (HCC): Status: ACTIVE | Noted: 2023-02-13

## 2023-02-13 PROBLEM — E66.3 OVERWEIGHT WITH BODY MASS INDEX (BMI) OF 26 TO 26.9 IN ADULT: Status: ACTIVE | Noted: 2023-02-13

## 2023-02-13 PROBLEM — E78.5 HYPERLIPIDEMIA ASSOCIATED WITH TYPE 2 DIABETES MELLITUS (HCC): Status: ACTIVE | Noted: 2023-02-13

## 2023-02-13 PROBLEM — E11.59 HYPERTENSION ASSOCIATED WITH DIABETES (HCC): Status: ACTIVE | Noted: 2023-02-13

## 2023-02-27 ENCOUNTER — HOSPITAL ENCOUNTER (OUTPATIENT)
Dept: LAB | Facility: MEDICAL CENTER | Age: 83
End: 2023-02-27
Attending: INTERNAL MEDICINE
Payer: MEDICARE

## 2023-02-27 LAB
ALBUMIN SERPL BCP-MCNC: 3.8 G/DL (ref 3.2–4.9)
ALBUMIN/GLOB SERPL: 1.2 G/DL
ALP SERPL-CCNC: 87 U/L (ref 30–99)
ALT SERPL-CCNC: 17 U/L (ref 2–50)
ANION GAP SERPL CALC-SCNC: 10 MMOL/L (ref 7–16)
AST SERPL-CCNC: 24 U/L (ref 12–45)
BASOPHILS # BLD AUTO: 0.7 % (ref 0–1.8)
BASOPHILS # BLD: 0.04 K/UL (ref 0–0.12)
BILIRUB SERPL-MCNC: 0.4 MG/DL (ref 0.1–1.5)
BUN SERPL-MCNC: 18 MG/DL (ref 8–22)
CALCIUM ALBUM COR SERPL-MCNC: 9.7 MG/DL (ref 8.5–10.5)
CALCIUM SERPL-MCNC: 9.5 MG/DL (ref 8.4–10.2)
CHLORIDE SERPL-SCNC: 103 MMOL/L (ref 96–112)
CO2 SERPL-SCNC: 26 MMOL/L (ref 20–33)
CREAT SERPL-MCNC: 0.74 MG/DL (ref 0.5–1.4)
EOSINOPHIL # BLD AUTO: 0.14 K/UL (ref 0–0.51)
EOSINOPHIL NFR BLD: 2.4 % (ref 0–6.9)
ERYTHROCYTE [DISTWIDTH] IN BLOOD BY AUTOMATED COUNT: 51.4 FL (ref 35.9–50)
GFR SERPLBLD CREATININE-BSD FMLA CKD-EPI: 81 ML/MIN/1.73 M 2
GLOBULIN SER CALC-MCNC: 3.3 G/DL (ref 1.9–3.5)
GLUCOSE SERPL-MCNC: 141 MG/DL (ref 65–99)
HCT VFR BLD AUTO: 38.9 % (ref 37–47)
HGB BLD-MCNC: 12.8 G/DL (ref 12–16)
IMM GRANULOCYTES # BLD AUTO: 0.01 K/UL (ref 0–0.11)
IMM GRANULOCYTES NFR BLD AUTO: 0.2 % (ref 0–0.9)
LYMPHOCYTES # BLD AUTO: 1.66 K/UL (ref 1–4.8)
LYMPHOCYTES NFR BLD: 28 % (ref 22–41)
MCH RBC QN AUTO: 33.9 PG (ref 27–33)
MCHC RBC AUTO-ENTMCNC: 32.9 G/DL (ref 33.6–35)
MCV RBC AUTO: 102.9 FL (ref 81.4–97.8)
MONOCYTES # BLD AUTO: 0.8 K/UL (ref 0–0.85)
MONOCYTES NFR BLD AUTO: 13.5 % (ref 0–13.4)
NEUTROPHILS # BLD AUTO: 3.27 K/UL (ref 2–7.15)
NEUTROPHILS NFR BLD: 55.2 % (ref 44–72)
NRBC # BLD AUTO: 0 K/UL
NRBC BLD-RTO: 0 /100 WBC
PLATELET # BLD AUTO: 280 K/UL (ref 164–446)
PMV BLD AUTO: 8.9 FL (ref 9–12.9)
POTASSIUM SERPL-SCNC: 4.4 MMOL/L (ref 3.6–5.5)
PROT SERPL-MCNC: 7.1 G/DL (ref 6–8.2)
RBC # BLD AUTO: 3.78 M/UL (ref 4.2–5.4)
SODIUM SERPL-SCNC: 139 MMOL/L (ref 135–145)
WBC # BLD AUTO: 5.9 K/UL (ref 4.8–10.8)

## 2023-02-27 PROCEDURE — 80053 COMPREHEN METABOLIC PANEL: CPT

## 2023-02-27 PROCEDURE — 85025 COMPLETE CBC W/AUTO DIFF WBC: CPT

## 2023-02-27 PROCEDURE — 36415 COLL VENOUS BLD VENIPUNCTURE: CPT

## 2023-04-03 ENCOUNTER — HOSPITAL ENCOUNTER (OUTPATIENT)
Dept: LAB | Facility: MEDICAL CENTER | Age: 83
End: 2023-04-03
Attending: INTERNAL MEDICINE
Payer: MEDICARE

## 2023-04-03 LAB
ALT SERPL-CCNC: 17 U/L (ref 2–50)
AST SERPL-CCNC: 23 U/L (ref 12–45)
BASOPHILS # BLD AUTO: 0.5 % (ref 0–1.8)
BASOPHILS # BLD: 0.03 K/UL (ref 0–0.12)
CREAT SERPL-MCNC: 0.67 MG/DL (ref 0.5–1.4)
EOSINOPHIL # BLD AUTO: 0.14 K/UL (ref 0–0.51)
EOSINOPHIL NFR BLD: 2.5 % (ref 0–6.9)
ERYTHROCYTE [DISTWIDTH] IN BLOOD BY AUTOMATED COUNT: 52.7 FL (ref 35.9–50)
ERYTHROCYTE [SEDIMENTATION RATE] IN BLOOD BY WESTERGREN METHOD: 53 MM/HOUR (ref 0–25)
GFR SERPLBLD CREATININE-BSD FMLA CKD-EPI: 88 ML/MIN/1.73 M 2
HCT VFR BLD AUTO: 36.7 % (ref 37–47)
HGB BLD-MCNC: 12 G/DL (ref 12–16)
IMM GRANULOCYTES # BLD AUTO: 0.02 K/UL (ref 0–0.11)
IMM GRANULOCYTES NFR BLD AUTO: 0.4 % (ref 0–0.9)
LYMPHOCYTES # BLD AUTO: 1.65 K/UL (ref 1–4.8)
LYMPHOCYTES NFR BLD: 29.4 % (ref 22–41)
MCH RBC QN AUTO: 33.6 PG (ref 27–33)
MCHC RBC AUTO-ENTMCNC: 32.7 G/DL (ref 33.6–35)
MCV RBC AUTO: 102.8 FL (ref 81.4–97.8)
MONOCYTES # BLD AUTO: 0.54 K/UL (ref 0–0.85)
MONOCYTES NFR BLD AUTO: 9.6 % (ref 0–13.4)
NEUTROPHILS # BLD AUTO: 3.23 K/UL (ref 2–7.15)
NEUTROPHILS NFR BLD: 57.6 % (ref 44–72)
NRBC # BLD AUTO: 0 K/UL
NRBC BLD-RTO: 0 /100 WBC
PLATELET # BLD AUTO: 326 K/UL (ref 164–446)
PMV BLD AUTO: 9.4 FL (ref 9–12.9)
RBC # BLD AUTO: 3.57 M/UL (ref 4.2–5.4)
WBC # BLD AUTO: 5.6 K/UL (ref 4.8–10.8)

## 2023-04-03 PROCEDURE — 85025 COMPLETE CBC W/AUTO DIFF WBC: CPT

## 2023-04-03 PROCEDURE — 85652 RBC SED RATE AUTOMATED: CPT

## 2023-04-03 PROCEDURE — 82565 ASSAY OF CREATININE: CPT

## 2023-04-03 PROCEDURE — 84460 ALANINE AMINO (ALT) (SGPT): CPT

## 2023-04-03 PROCEDURE — 84450 TRANSFERASE (AST) (SGOT): CPT

## 2023-04-03 PROCEDURE — 36415 COLL VENOUS BLD VENIPUNCTURE: CPT

## 2023-04-05 ENCOUNTER — APPOINTMENT (OUTPATIENT)
Dept: ADMISSIONS | Facility: MEDICAL CENTER | Age: 83
End: 2023-04-05
Attending: INTERNAL MEDICINE
Payer: MEDICARE

## 2023-04-10 ENCOUNTER — PRE-ADMISSION TESTING (OUTPATIENT)
Dept: ADMISSIONS | Facility: MEDICAL CENTER | Age: 83
End: 2023-04-10
Attending: INTERNAL MEDICINE
Payer: MEDICARE

## 2023-05-01 ENCOUNTER — PRE-ADMISSION TESTING (OUTPATIENT)
Dept: ADMISSIONS | Facility: MEDICAL CENTER | Age: 83
End: 2023-05-01
Attending: INTERNAL MEDICINE
Payer: MEDICARE

## 2023-05-01 ENCOUNTER — OFFICE VISIT (OUTPATIENT)
Dept: MEDICAL GROUP | Facility: MEDICAL CENTER | Age: 83
End: 2023-05-01
Payer: MEDICARE

## 2023-05-01 VITALS
HEART RATE: 76 BPM | DIASTOLIC BLOOD PRESSURE: 60 MMHG | HEIGHT: 61 IN | SYSTOLIC BLOOD PRESSURE: 120 MMHG | TEMPERATURE: 98.3 F | RESPIRATION RATE: 17 BRPM | BODY MASS INDEX: 25.3 KG/M2 | WEIGHT: 134 LBS | OXYGEN SATURATION: 93 %

## 2023-05-01 DIAGNOSIS — R19.7 DIARRHEA, UNSPECIFIED TYPE: ICD-10-CM

## 2023-05-01 DIAGNOSIS — Z01.812 PRE-OPERATIVE LABORATORY EXAMINATION: ICD-10-CM

## 2023-05-01 DIAGNOSIS — K80.50 CALCULUS OF BILE DUCT WITHOUT CHOLECYSTITIS AND WITHOUT OBSTRUCTION: ICD-10-CM

## 2023-05-01 PROBLEM — Z09 HOSPITAL DISCHARGE FOLLOW-UP: Status: RESOLVED | Noted: 2023-01-23 | Resolved: 2023-05-01

## 2023-05-01 PROBLEM — E66.3 OVERWEIGHT WITH BODY MASS INDEX (BMI) OF 27 TO 27.9 IN ADULT: Status: RESOLVED | Noted: 2022-05-17 | Resolved: 2023-05-01

## 2023-05-01 PROBLEM — E66.3 OVERWEIGHT WITH BODY MASS INDEX (BMI) OF 26 TO 26.9 IN ADULT: Status: RESOLVED | Noted: 2023-02-13 | Resolved: 2023-05-01

## 2023-05-01 PROBLEM — E83.42 HYPOMAGNESEMIA: Status: RESOLVED | Noted: 2023-01-19 | Resolved: 2023-05-01

## 2023-05-01 PROBLEM — R30.0 DYSURIA: Status: RESOLVED | Noted: 2022-07-12 | Resolved: 2023-05-01

## 2023-05-01 LAB
ALBUMIN SERPL BCP-MCNC: 4.4 G/DL (ref 3.2–4.9)
ALBUMIN/GLOB SERPL: 1.3 G/DL
ALP SERPL-CCNC: 89 U/L (ref 30–99)
ALT SERPL-CCNC: 13 U/L (ref 2–50)
ANION GAP SERPL CALC-SCNC: 11 MMOL/L (ref 7–16)
AST SERPL-CCNC: 19 U/L (ref 12–45)
BASOPHILS # BLD AUTO: 0.7 % (ref 0–1.8)
BASOPHILS # BLD: 0.04 K/UL (ref 0–0.12)
BILIRUB SERPL-MCNC: 0.5 MG/DL (ref 0.1–1.5)
BUN SERPL-MCNC: 28 MG/DL (ref 8–22)
CALCIUM ALBUM COR SERPL-MCNC: 9.7 MG/DL (ref 8.5–10.5)
CALCIUM SERPL-MCNC: 10 MG/DL (ref 8.5–10.5)
CHLORIDE SERPL-SCNC: 105 MMOL/L (ref 96–112)
CO2 SERPL-SCNC: 22 MMOL/L (ref 20–33)
CREAT SERPL-MCNC: 1.03 MG/DL (ref 0.5–1.4)
EOSINOPHIL # BLD AUTO: 0.03 K/UL (ref 0–0.51)
EOSINOPHIL NFR BLD: 0.5 % (ref 0–6.9)
ERYTHROCYTE [DISTWIDTH] IN BLOOD BY AUTOMATED COUNT: 54.2 FL (ref 35.9–50)
GFR SERPLBLD CREATININE-BSD FMLA CKD-EPI: 55 ML/MIN/1.73 M 2
GLOBULIN SER CALC-MCNC: 3.4 G/DL (ref 1.9–3.5)
GLUCOSE SERPL-MCNC: 145 MG/DL (ref 65–99)
HCT VFR BLD AUTO: 39.7 % (ref 37–47)
HGB BLD-MCNC: 12.9 G/DL (ref 12–16)
IMM GRANULOCYTES # BLD AUTO: 0.02 K/UL (ref 0–0.11)
IMM GRANULOCYTES NFR BLD AUTO: 0.4 % (ref 0–0.9)
INR PPP: 0.98 (ref 0.87–1.13)
LYMPHOCYTES # BLD AUTO: 0.69 K/UL (ref 1–4.8)
LYMPHOCYTES NFR BLD: 12.5 % (ref 22–41)
MCH RBC QN AUTO: 33.4 PG (ref 27–33)
MCHC RBC AUTO-ENTMCNC: 32.5 G/DL (ref 33.6–35)
MCV RBC AUTO: 102.8 FL (ref 81.4–97.8)
MONOCYTES # BLD AUTO: 0.58 K/UL (ref 0–0.85)
MONOCYTES NFR BLD AUTO: 10.5 % (ref 0–13.4)
NEUTROPHILS # BLD AUTO: 4.16 K/UL (ref 2–7.15)
NEUTROPHILS NFR BLD: 75.4 % (ref 44–72)
NRBC # BLD AUTO: 0 K/UL
NRBC BLD-RTO: 0 /100 WBC
PLATELET # BLD AUTO: 264 K/UL (ref 164–446)
PMV BLD AUTO: 9.5 FL (ref 9–12.9)
POTASSIUM SERPL-SCNC: 5.1 MMOL/L (ref 3.6–5.5)
PROT SERPL-MCNC: 7.8 G/DL (ref 6–8.2)
PROTHROMBIN TIME: 12.9 SEC (ref 12–14.6)
RBC # BLD AUTO: 3.86 M/UL (ref 4.2–5.4)
SODIUM SERPL-SCNC: 138 MMOL/L (ref 135–145)
WBC # BLD AUTO: 5.5 K/UL (ref 4.8–10.8)

## 2023-05-01 PROCEDURE — 99214 OFFICE O/P EST MOD 30 MIN: CPT | Performed by: PHYSICIAN ASSISTANT

## 2023-05-01 PROCEDURE — 85610 PROTHROMBIN TIME: CPT

## 2023-05-01 PROCEDURE — 80053 COMPREHEN METABOLIC PANEL: CPT

## 2023-05-01 PROCEDURE — 85025 COMPLETE CBC W/AUTO DIFF WBC: CPT

## 2023-05-01 PROCEDURE — 36415 COLL VENOUS BLD VENIPUNCTURE: CPT

## 2023-05-01 ASSESSMENT — FIBROSIS 4 INDEX: FIB4 SCORE: 1.6

## 2023-05-01 NOTE — PROGRESS NOTES
Subjective:   Jewell Diaz is a 82 y.o. female here today for diarrhea for 1 day.    Diarrhea  This is a pleasant 82-year-old female accompanied by her , Mitzi.  Complains of diarrhea since yesterday.  Yesterday had many bouts of diarrhea.  Today diarrhea has improved somewhat.  Denies any fever.  No nausea vomiting.  No abdominal pain.  No rectal bleeding.  No known sick contacts.  No recent travel.  No recent antibiotic use.  Is concerned because she has a procedure tomorrow with Dr. Terrazas.  Is a history of a biliary calculus.  She tried to take Pepto-Bismol yesterday but no improvement.  She is really concerned that she will not be able to handle the procedure and will have issues postop.       Current medicines (including changes today)  Current Outpatient Medications   Medication Sig Dispense Refill    hydrochlorothiazide (MICROZIDE) 12.5 MG capsule Take 1 Capsule by mouth every day. 100 Capsule 3    spironolactone (ALDACTONE) 25 MG Tab Take 1 Tablet by mouth every day for 100 days. 90 Tablet 3    CALCIUM PO Take 1 Tablet by mouth 2 times a day.      Cholecalciferol (D3 PO) Take 1 Capsule by mouth every day.      Ferrous Sulfate (IRON PO) Take 1 Tablet by mouth every day. OTC      metformin (GLUCOPHAGE) 1000 MG tablet metformin 1,000 mg tablet (Patient taking differently: Take 1,000 mg by mouth 2 times a day with meals. metformin 1,000 mg tablet) 180 Tablet 3    pioglitazone (ACTOS) 15 MG Tab Take 1 Tablet by mouth every day. 90 Tablet 3    atorvastatin (LIPITOR) 40 MG Tab Take 1 Tablet by mouth every evening. 100 Tablet 3    losartan (COZAAR) 100 MG Tab Take 1 Tablet by mouth every day. (Patient taking differently: Take 100 mg by mouth every morning.) 90 Tablet 3    glucose blood strip 1 Strip by Other route every day. 100 Strip 3    dorzolamide-timolol (COSOPT) 22.3-6.8 MG/ML Solution Administer 1 Drop into both eyes 2 times a day.      methotrexate 2.5 MG tablet Take 10 mg by mouth every  "7 days. 4 tablets on Thursday      folic acid (FOLVITE) 1 MG Tab Take 1 mg by mouth every day.      multivitamin (THERAGRAN) TABS Take 1 Tab by mouth every day.      omeprazole (PRILOSEC) 20 MG CPDR Take 20 mg by mouth every morning.       No current facility-administered medications for this visit.     She  has a past medical history of CATARACT (2009), Diabetes (2007), DJD (degenerative joint disease) of thoracic spine, High cholesterol, Hypertension, Liver disorder, Pain, RA (rheumatoid arthritis) ([facility]), and Urinary incontinence (07/14/2020).    Social History and Family History were reviewed and updated.    ROS   No chest pain, no shortness of breath, no abdominal pain and all other systems were reviewed and are negative.       Objective:     /60 (BP Location: Right arm, Patient Position: Sitting, BP Cuff Size: Adult)   Pulse 76   Temp 36.8 °C (98.3 °F) (Temporal)   Resp 17   Ht 1.549 m (5' 1\")   Wt 60.8 kg (134 lb)   SpO2 93%  Body mass index is 25.32 kg/m².   Physical Exam:  Constitutional: Alert, no distress.  Skin: Warm, dry, good turgor, no rashes in visible areas.  Eye: Equal, round and reactive, conjunctiva clear, lids normal.  ENMT: Lips without lesions, good dentition, oropharynx clear.  Neck: Trachea midline, no masses.   Lymph: No cervical or supraclavicular lymphadenopathy  Respiratory: Unlabored respiratory effort, lungs appear clear.  Abdomen: Soft, non-tender, no masses.  Psych: Alert and oriented x3, normal affect and mood.        Assessment and Plan:   The following treatment plan was discussed    1. Diarrhea, unspecified type  Acute, new onset condition.  Does not appear to be infectious.  No red flags.  Discussed pushing fluids.  Try over-the-counter Imodium.  Did send a message to her gastroenterologist for regarding tomorrow's procedure.  Likely she is a fit candidate given the diarrhea.  Advised to follow-up at the ER with any worsening symptoms such as fever, abdominal pain or " rectal bleeding.  Or if to follow-up with us if she has diarrhea for more than 2 weeks.  Expect her symptoms to resolve.  Likely viral process.    2. 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  She appears stable today.  Follow-up with her gastroenterologist.         Followup: Return if symptoms worsen or fail to improve.    Please note that this dictation was created using voice recognition software. I have made every reasonable attempt to correct obvious errors, but I expect that there are errors of grammar and possibly content that I did not discover before finalizing the note.

## 2023-05-01 NOTE — ASSESSMENT & PLAN NOTE
This is a pleasant 82-year-old female accompanied by her , Mitzi.  Complains of diarrhea since yesterday.  Yesterday had many bouts of diarrhea.  Today diarrhea has improved somewhat.  Denies any fever.  No nausea vomiting.  No abdominal pain.  No rectal bleeding.  No known sick contacts.  No recent travel.  No recent antibiotic use.  Is concerned because she has a procedure tomorrow with Dr. Terrazas.  Is a history of a biliary calculus.  She tried to take Pepto-Bismol yesterday but no improvement.  She is really concerned that she will not be able to handle the procedure and will have issues postop.

## 2023-05-02 ENCOUNTER — HOSPITAL ENCOUNTER (OUTPATIENT)
Facility: MEDICAL CENTER | Age: 83
End: 2023-05-02
Attending: INTERNAL MEDICINE | Admitting: INTERNAL MEDICINE
Payer: MEDICARE

## 2023-05-02 ENCOUNTER — APPOINTMENT (OUTPATIENT)
Dept: RADIOLOGY | Facility: MEDICAL CENTER | Age: 83
End: 2023-05-02
Attending: INTERNAL MEDICINE
Payer: MEDICARE

## 2023-05-02 VITALS
RESPIRATION RATE: 16 BRPM | OXYGEN SATURATION: 95 % | WEIGHT: 132.06 LBS | TEMPERATURE: 97.9 F | SYSTOLIC BLOOD PRESSURE: 127 MMHG | HEART RATE: 75 BPM | DIASTOLIC BLOOD PRESSURE: 58 MMHG | HEIGHT: 61 IN | BODY MASS INDEX: 24.93 KG/M2

## 2023-05-02 DIAGNOSIS — K80.50 CHOLEDOCHOLITHIASIS: ICD-10-CM

## 2023-05-02 LAB — GLUCOSE BLD STRIP.AUTO-MCNC: 129 MG/DL (ref 65–99)

## 2023-05-02 PROCEDURE — 700101 HCHG RX REV CODE 250

## 2023-05-02 PROCEDURE — 700105 HCHG RX REV CODE 258: Performed by: STUDENT IN AN ORGANIZED HEALTH CARE EDUCATION/TRAINING PROGRAM

## 2023-05-02 PROCEDURE — 160035 HCHG PACU - 1ST 60 MINS PHASE I

## 2023-05-02 PROCEDURE — 99153 MOD SED SAME PHYS/QHP EA: CPT

## 2023-05-02 PROCEDURE — 700111 HCHG RX REV CODE 636 W/ 250 OVERRIDE (IP)

## 2023-05-02 PROCEDURE — 82962 GLUCOSE BLOOD TEST: CPT

## 2023-05-02 PROCEDURE — 700111 HCHG RX REV CODE 636 W/ 250 OVERRIDE (IP): Performed by: STUDENT IN AN ORGANIZED HEALTH CARE EDUCATION/TRAINING PROGRAM

## 2023-05-02 PROCEDURE — 700117 HCHG RX CONTRAST REV CODE 255: Performed by: STUDENT IN AN ORGANIZED HEALTH CARE EDUCATION/TRAINING PROGRAM

## 2023-05-02 PROCEDURE — 160046 HCHG PACU - 1ST 60 MINS PHASE II

## 2023-05-02 PROCEDURE — 160002 HCHG RECOVERY MINUTES (STAT)

## 2023-05-02 RX ORDER — ONDANSETRON 2 MG/ML
4 INJECTION INTRAMUSCULAR; INTRAVENOUS PRN
Status: ACTIVE | OUTPATIENT
Start: 2023-05-02 | End: 2023-05-02

## 2023-05-02 RX ORDER — MIDAZOLAM HYDROCHLORIDE 1 MG/ML
INJECTION INTRAMUSCULAR; INTRAVENOUS
Status: COMPLETED
Start: 2023-05-02 | End: 2023-05-02

## 2023-05-02 RX ORDER — LOSARTAN POTASSIUM 100 MG/1
100 TABLET ORAL
COMMUNITY
End: 2023-05-23 | Stop reason: SDUPTHER

## 2023-05-02 RX ORDER — SODIUM CHLORIDE 9 MG/ML
1000 INJECTION, SOLUTION INTRAVENOUS
Status: COMPLETED | OUTPATIENT
Start: 2023-05-02 | End: 2023-05-02

## 2023-05-02 RX ORDER — MIDAZOLAM HYDROCHLORIDE 1 MG/ML
INJECTION INTRAMUSCULAR; INTRAVENOUS
Status: DISCONTINUED
Start: 2023-05-02 | End: 2023-05-02 | Stop reason: HOSPADM

## 2023-05-02 RX ORDER — SODIUM CHLORIDE 9 MG/ML
500 INJECTION, SOLUTION INTRAVENOUS
Status: DISPENSED | OUTPATIENT
Start: 2023-05-02 | End: 2023-05-02

## 2023-05-02 RX ORDER — LIDOCAINE HYDROCHLORIDE 10 MG/ML
INJECTION, SOLUTION INFILTRATION; PERINEURAL
Status: COMPLETED
Start: 2023-05-02 | End: 2023-05-02

## 2023-05-02 RX ORDER — MIDAZOLAM HYDROCHLORIDE 1 MG/ML
.5-2 INJECTION INTRAMUSCULAR; INTRAVENOUS PRN
Status: ACTIVE | OUTPATIENT
Start: 2023-05-02 | End: 2023-05-02

## 2023-05-02 RX ORDER — PIPERACILLIN SODIUM, TAZOBACTAM SODIUM 4; .5 G/20ML; G/20ML
INJECTION, POWDER, LYOPHILIZED, FOR SOLUTION INTRAVENOUS
Status: DISCONTINUED
Start: 2023-05-02 | End: 2023-05-02 | Stop reason: HOSPADM

## 2023-05-02 RX ADMIN — MIDAZOLAM HYDROCHLORIDE 2 MG: 1 INJECTION, SOLUTION INTRAMUSCULAR; INTRAVENOUS at 11:28

## 2023-05-02 RX ADMIN — MIDAZOLAM HYDROCHLORIDE 1 MG: 1 INJECTION, SOLUTION INTRAMUSCULAR; INTRAVENOUS at 11:45

## 2023-05-02 RX ADMIN — MIDAZOLAM HYDROCHLORIDE 1 MG: 1 INJECTION, SOLUTION INTRAMUSCULAR; INTRAVENOUS at 12:02

## 2023-05-02 RX ADMIN — FENTANYL CITRATE 50 MCG: 50 INJECTION, SOLUTION INTRAMUSCULAR; INTRAVENOUS at 11:29

## 2023-05-02 RX ADMIN — FENTANYL CITRATE 50 MCG: 50 INJECTION, SOLUTION INTRAMUSCULAR; INTRAVENOUS at 11:51

## 2023-05-02 RX ADMIN — IOHEXOL 75 ML: 300 INJECTION, SOLUTION INTRAVENOUS at 12:45

## 2023-05-02 RX ADMIN — LIDOCAINE HYDROCHLORIDE: 10 INJECTION, SOLUTION INFILTRATION; PERINEURAL at 11:15

## 2023-05-02 RX ADMIN — SODIUM CHLORIDE 1000 ML: 9 INJECTION, SOLUTION INTRAVENOUS at 08:37

## 2023-05-02 RX ADMIN — PIPERACILLIN AND TAZOBACTAM 4.5 G: 4; .5 INJECTION, POWDER, LYOPHILIZED, FOR SOLUTION INTRAVENOUS; PARENTERAL at 11:39

## 2023-05-02 ASSESSMENT — FIBROSIS 4 INDEX: FIB4 SCORE: 1.6

## 2023-05-02 NOTE — PROGRESS NOTES
Pt presents to Ir 1. Pt was consented by MD at bedside, confirmed by this RN and consent at bedside. Pt transferred to IR table in supine position. Patient underwent an attempted PTC by Dr. Hedrick. Procedure site was marked by MD and verified using imaging guidance. Pt placed on monitor, prepped and draped in a sterile fashion. Vitals were taken every 5 minutes and remained stable during procedure (see doc flow sheet for results). CO2 waveform capnography was monitored and remained WNL throughout procedure. Report called to Sabrina GAO. Pt transported by estuardo with RN to PACU.

## 2023-05-02 NOTE — OR NURSING
1359 Patient recovered well in PACU, no pain or nausea throughout PACU stay, tolerating PO fluids without issue, dressing to right sided rib cage with gauze and tegaderm; clean/dry/intact throughout PACU stay, family updated via telephone x2, Dr. Hedrick at bedside momentarily, vss throughout, patient able to void into purewick x1, educated on IS use, report called to SIMA Acosta for phase II, patient agrees/asks questions regarding plan, safety ensured. Care transferred.

## 2023-05-02 NOTE — H&P
"History and Physical    Date: 5/2/2023    PCP: Philippe Krishna D.O.      CC: Choledocholithiasis    HPI: This is a 82 y.o. female who is presenting with choledocholithiasis    Past Medical History:   Diagnosis Date    CATARACT 2009    BILATERAL REMOVAL     Diabetes 2007    diet,oral meds    DJD (degenerative joint disease) of thoracic spine     High cholesterol     Hypertension     Liver disorder     Pt. reports she has a \"Stone\" in her liver.    Pain     legs,back,pain scale 6    RA (rheumatoid arthritis) (HCC)     Hands    Urinary incontinence 07/14/2020       Past Surgical History:   Procedure Laterality Date    [place] ERCP,DIAGNOSTIC N/A 2/2/2023    Procedure: ENDOSCOPIC RETROGRADE CHOLANGIOPANCREATOGRAPHY AND ENDOSCOPIC ULTRASOUND;  Surgeon: Mickey Terrazas M.D.;  Location: [facility];  Service: Gastroenterology    EGD W/ENDOSCOPIC ULTRASOUND N/A 2/2/2023    Procedure: EGD, WITH ENDOSCOPIC US;  Surgeon: Mickey Terrazas M.D.;  Location: [facility];  Service: Gastroenterology    [place] ERCP,DIAGNOSTIC N/A 1/17/2023    Procedure: ENDOSCOPIC RETROGRADE CHOLANGIOPANCREATOGRAPHY;  Surgeon: Milton Walker M.D.;  Location: [facility];  Service: Gastroenterology    CERVICAL DISK AND FUSION ANTERIOR  7/20/2020    Procedure: DISCECTOMY, SPINE, CERVICAL, ANTERIOR APPROACH, WITH FUSION- C4-6;  Surgeon: Ezra Bailey M.D.;  Location: [facility];  Service: Neurosurgery    SHOSHANA BY LAPAROSCOPY  8/21/2012    Performed by CAMMIE HUGHES at [facility] ORS    FUSION, SPINE, LUMBAR, PLIF  5/6/2010    Performed by CASSI RESTREPO at SURGERY [facility] ORS    LUMBAR DECOMPRESSION  5/6/2010    Performed by CASSI RESTREPO at SURGERY [facility] ORS    LUMBAR LAMINECTOMY DISKECTOMY  12/26/2009    Performed by CASSI RESTREPO at SURGERY [facility] ORS    CATARACT EXT W/IOL  6/2009    bilateral    VAGINAL HYSTERECTOMY TOTAL  1976    Hysterectomy,Total Vaginal       No current " facility-administered medications for this encounter.        Social History     Socioeconomic History    Marital status:      Spouse name: Not on file    Number of children: Not on file    Years of education: Not on file    Highest education level: Not on file   Occupational History    Not on file   Tobacco Use    Smoking status: Never    Smokeless tobacco: Never   Vaping Use    Vaping Use: Never used   Substance and Sexual Activity    Alcohol use: No     Alcohol/week: 0.0 oz    Drug use: No    Sexual activity: Not on file   Other Topics Concern    Not on file   Social History Narrative    Not on file     Social Determinants of Health     Financial Resource Strain: Not on file   Food Insecurity: Not on file   Transportation Needs: Not on file   Physical Activity: Not on file   Stress: Not on file   Social Connections: Not on file   Intimate Partner Violence: Not on file   Housing Stability: Not on file       Family History   Problem Relation Age of Onset    Cancer Sister         breast       Allergies:  Patient has no known allergies.    Review of Systems:  Negative except for none    Physical Exam  Pulmonary:      Effort: Pulmonary effort is normal.   Skin:     General: Skin is warm and dry.   Neurological:      Mental Status: She is alert.   Psychiatric:         Mood and Affect: Mood normal.         Behavior: Behavior normal.         Thought Content: Thought content normal.         Judgment: Judgment normal.       Vital Signs  Blood Pressure : 129/60   Temperature: 36.3 °C (97.3 °F)   Pulse: 69   Respiration: 14   Pulse Oximetry: 94 %        Labs:  Recent Labs     05/01/23  1057   WBC 5.5   RBC 3.86*   HEMOGLOBIN 12.9   HEMATOCRIT 39.7   .8*   MCH 33.4*   MCHC 32.5*   RDW 54.2*   PLATELETCT 264   MPV 9.5     Recent Labs     05/01/23  1057   SODIUM 138   POTASSIUM 5.1   CHLORIDE 105   CO2 22   GLUCOSE 145*   BUN 28*   CREATININE 1.03   CALCIUM 10.0     Recent Labs     05/01/23  1057   INR 0.98      Recent Labs     05/01/23  1057   ASTSGOT 19   ALTSGPT 13   TBILIRUBIN 0.5   ALKPHOSPHAT 89   GLOBULIN 3.4   INR 0.98       Radiology:  IR-PTC (INCLUDES ALL RADIOLOGY)    (Results Pending)             Assessment and Plan:This is a 82 y.o. with choledocholithiasis for PTBD placement for rendezvous.

## 2023-05-02 NOTE — OR NURSING
1350: Report received from SIMA Acosta. Patient to Phase II 32 pending transport. Patient provided with water. Patient tolerating PO intake.    1402: Patient to Phase II Rm 32. Right rib cage dressing CDI. Vitals taken upon arrival. Plan of care discussed with patient. Patient belongings returned to patient at bedside. Bed-rest in place until 1420.    1410: Patient and family updated regarding Plan of care.    1420: bedrest complete, patient ambulates to restroom.    1430: pulse oxymetry very positional. When patient using tablet or I.S., pressure to probe causes readings to drop erroneously. Patient asymptomatic and consistently staying above 92% SpO2 without oxygen supplementation.    1455: Pt discharged home. Discharge instructions given to pt prior to leaving unit including when to see PCP, and new medications if applicable. PIV removed. All questions answered. Verbalized understanding. All belongings with pt when leaving unit via wheelchair with family.

## 2023-05-02 NOTE — OR SURGEON
Immediate Post- Operative Note        Findings: No significant biliary dilataton      Procedure(s): Unsuccessful PTBD      Estimated Blood Loss: Less than 5 ml        Complications: None            5/2/2023     12:11 PM     Milton Hedrick M.D.

## 2023-05-02 NOTE — DISCHARGE INSTRUCTIONS
HOME CARE INSTRUCTIONS    ACTIVITY: Rest and take it easy for the first 24 hours.  A responsible adult is recommended to remain with you during that time.  It is normal to feel sleepy.  We encourage you to not do anything that requires balance, judgment or coordination.    FOR 24 HOURS DO NOT:  Drive, operate machinery or run household appliances.  Drink beer or alcoholic beverages.  Make important decisions or sign legal documents.    SPECIAL INSTRUCTIONS: Call MD for instructions on when surgical site can get wet. Keep surgical site clean and dry until instructed otherwise.    DIET: To avoid nausea, slowly advance diet as tolerated, avoiding spicy or greasy foods for the first day.  Add more substantial food to your diet according to your physician's instructions.  Babies can be fed formula or breast milk as soon as they are hungry.  INCREASE FLUIDS AND FIBER TO AVOID CONSTIPATION.    SURGICAL DRESSING/BATHING: as above.    MEDICATIONS: Resume taking daily medication.  Take prescribed pain medication with food.  If no medication is prescribed, you may take non-aspirin pain medication if needed.  PAIN MEDICATION CAN BE VERY CONSTIPATING.  Take a stool softener or laxative such as senokot, pericolace, or milk of magnesia if needed.    A follow-up appointment should be arranged with your doctor; call to schedule.    You should CALL YOUR PHYSICIAN if you develop:  Fever greater than 101 degrees F.  Pain not relieved by medication, or persistent nausea or vomiting.  Excessive bleeding (blood soaking through dressing) or unexpected drainage from the wound.  Extreme redness or swelling around the incision site, drainage of pus or foul smelling drainage.  Inability to urinate or empty your bladder within 8 hours.  Problems with breathing or chest pain.    You should call 911 if you develop problems with breathing or chest pain.  If you are unable to contact your doctor or surgical center, you should go to the nearest  emergency room or urgent care center.  Physician's telephone #: [phone]    MILD FLU-LIKE SYMPTOMS ARE NORMAL.  YOU MAY EXPERIENCE GENERALIZED MUSCLE ACHES, THROAT IRRITATION, HEADACHE AND/OR SOME NAUSEA.    If any questions arise, call your doctor.  If your doctor is not available, please feel free to call the Surgical Center at [phone].  The Center is open Monday through Friday from 7AM to 7PM.      A registered nurse may call you a few days after your surgery to see how you are doing after your procedure.    You may also receive a survey in the mail within the next two weeks and we ask that you take a few moments to complete the survey and return it to us.  Our goal is to provide you with very good care and we value your comments.     Depression / Suicide Risk    As you are discharged from this [facility] facility, it is important to learn how to keep safe from harming yourself.    Recognize the warning signs:  Abrupt changes in personality, positive or negative- including increase in energy   Giving away possessions  Change in eating patterns- significant weight changes-  positive or negative  Change in sleeping patterns- unable to sleep or sleeping all the time   Unwillingness or inability to communicate  Depression  Unusual sadness, discouragement and loneliness  Talk of wanting to die  Neglect of personal appearance   Rebelliousness- reckless behavior  Withdrawal from people/activities they love  Confusion- inability to concentrate     If you or a loved one observes any of these behaviors or has concerns about self-harm, here's what you can do:  Talk about it- your feelings and reasons for harming yourself  Remove any means that you might use to hurt yourself (examples: pills, rope, extension cords, firearm)  Get professional help from the community (Mental Health, Substance Abuse, psychological counseling)  Do not be alone:Call your Safe Contact- someone whom you trust who will be there for  you.  Call your local CRISIS HOTLINE 358-2757 or 401-216-2755  Call your local Children's Mobile Crisis Response Team [place] (322) 103-8450 or www."[employer]  Call the toll free National Suicide Prevention Hotlines   National Suicide Prevention Lifeline 584-722-QHVZ (2957)  [facility] Line Network 800-SUICIDE (973-5980)    I acknowledge receipt and understanding of these Home Care instructions.

## 2023-05-23 RX ORDER — LOSARTAN POTASSIUM 100 MG/1
100 TABLET ORAL DAILY
Qty: 90 EACH | Refills: 3 | Status: SHIPPED | OUTPATIENT
Start: 2023-05-23 | End: 2023-05-25 | Stop reason: SDUPTHER

## 2023-05-25 ENCOUNTER — OFFICE VISIT (OUTPATIENT)
Dept: MEDICAL GROUP | Facility: MEDICAL CENTER | Age: 83
End: 2023-05-25
Payer: MEDICARE

## 2023-05-25 ENCOUNTER — HOSPITAL ENCOUNTER (OUTPATIENT)
Dept: RADIOLOGY | Facility: MEDICAL CENTER | Age: 83
End: 2023-05-25
Attending: PHYSICIAN ASSISTANT
Payer: MEDICARE

## 2023-05-25 VITALS
TEMPERATURE: 97.8 F | SYSTOLIC BLOOD PRESSURE: 114 MMHG | OXYGEN SATURATION: 95 % | HEART RATE: 69 BPM | WEIGHT: 136.6 LBS | BODY MASS INDEX: 25.79 KG/M2 | DIASTOLIC BLOOD PRESSURE: 60 MMHG | HEIGHT: 61 IN

## 2023-05-25 DIAGNOSIS — E11.59 TYPE 2 DIABETES MELLITUS WITH OTHER CIRCULATORY COMPLICATION, WITHOUT LONG-TERM CURRENT USE OF INSULIN (HCC): ICD-10-CM

## 2023-05-25 DIAGNOSIS — I10 PRIMARY HYPERTENSION: ICD-10-CM

## 2023-05-25 DIAGNOSIS — L03.211 CELLULITIS OF FACE: ICD-10-CM

## 2023-05-25 DIAGNOSIS — R05.3 CHRONIC COUGH: ICD-10-CM

## 2023-05-25 DIAGNOSIS — E78.00 PURE HYPERCHOLESTEROLEMIA: ICD-10-CM

## 2023-05-25 PROBLEM — R19.7 DIARRHEA: Status: RESOLVED | Noted: 2023-05-01 | Resolved: 2023-05-25

## 2023-05-25 PROCEDURE — 3078F DIAST BP <80 MM HG: CPT | Performed by: PHYSICIAN ASSISTANT

## 2023-05-25 PROCEDURE — 71046 X-RAY EXAM CHEST 2 VIEWS: CPT

## 2023-05-25 PROCEDURE — 3074F SYST BP LT 130 MM HG: CPT | Performed by: PHYSICIAN ASSISTANT

## 2023-05-25 PROCEDURE — 99214 OFFICE O/P EST MOD 30 MIN: CPT | Performed by: PHYSICIAN ASSISTANT

## 2023-05-25 RX ORDER — PIOGLITAZONEHYDROCHLORIDE 15 MG/1
TABLET ORAL
Qty: 100 TABLET | Refills: 3 | Status: SHIPPED | OUTPATIENT
Start: 2023-05-25

## 2023-05-25 RX ORDER — LOSARTAN POTASSIUM 100 MG/1
100 TABLET ORAL DAILY
Qty: 100 EACH | Refills: 3 | Status: SHIPPED | OUTPATIENT
Start: 2023-05-25 | End: 2024-05-19

## 2023-05-25 RX ORDER — DOXYCYCLINE HYCLATE 100 MG
100 TABLET ORAL 2 TIMES DAILY
Qty: 20 TABLET | Refills: 0 | Status: SHIPPED | OUTPATIENT
Start: 2023-05-25 | End: 2023-06-04

## 2023-05-25 RX ORDER — AMLODIPINE BESYLATE 5 MG/1
5 TABLET ORAL DAILY
COMMUNITY
Start: 2023-05-19 | End: 2023-11-21

## 2023-05-25 ASSESSMENT — FIBROSIS 4 INDEX: FIB4 SCORE: 1.6

## 2023-05-25 NOTE — ASSESSMENT & PLAN NOTE
Complains of dealing with a single lesion on her right cheek.  She states that she squeezed it when there was a allen.  Cause worsening inflammation.  Complains of some discomfort.

## 2023-05-25 NOTE — ASSESSMENT & PLAN NOTE
Requesting a renewal of metformin.  Was sent over recently to [facility].  [facility] contacted her and told that the medication prescription was canceled.  Unsure why they stated it was canceled but that is not the case.

## 2023-05-25 NOTE — PROGRESS NOTES
Subjective:   Jewell Diaz is a 82 y.o. female here today for diabetes and chronic cough.    Hypertension  This is a pleasant 82-year-old female accompanied by her , Mitzi.  Taking 3 blood pressure medication today.  We started amlodipine.  Blood pressure well controlled today.    Type 2 diabetes mellitus with circulatory disorder, without long-term current use of insulin ([facility])  Requesting a renewal of metformin.  Was sent over recently to [facility].  [facility] contacted her and told that the medication prescription was canceled.  Unsure why they stated it was canceled but that is not the case.    Cellulitis of face  Complains of dealing with a single lesion on her right cheek.  She states that she squeezed it when there was a allen.  Cause worsening inflammation.  Complains of some discomfort.    Chronic cough  Complains of a chronic history of a cough.  Greater than 3 years.  Diagnosed in the past with postnasal drainage.  Given some cough medication which was not effective.  It was a tablet form.  She denies any chest pain or shortness of breath.  No wheezing.       Current medicines (including changes today)  Current Outpatient Medications   Medication Sig Dispense Refill    metformin (GLUCOPHAGE) 1000 MG tablet Take 1 Tablet by mouth 2 times a day with meals for 360 days. 200 Tablet 3    losartan (COZAAR) 100 MG Tab Take 1 Tablet by mouth every day for 360 days. 100 Each 3    doxycycline (VIBRAMYCIN) 100 MG Tab Take 1 Tablet by mouth 2 times a day for 10 days. 20 Tablet 0    amLODIPine (NORVASC) 5 MG Tab Take 5 mg by mouth every day.      hydrochlorothiazide (MICROZIDE) 12.5 MG capsule Take 1 Capsule by mouth every day. 100 Capsule 3    CALCIUM PO Take 1 Tablet by mouth 2 times a day.      Cholecalciferol (D3 PO) Take 1 Capsule by mouth every evening.      Ferrous Sulfate (IRON PO) Take 1 Tablet by mouth every day. OTC      pioglitazone (ACTOS) 15 MG Tab Take 1 Tablet by mouth every day. 90  "Tablet 3    atorvastatin (LIPITOR) 40 MG Tab Take 1 Tablet by mouth every evening. 100 Tablet 3    glucose blood strip 1 Strip by Other route every day. 100 Strip 3    dorzolamide-timolol (COSOPT) 22.3-6.8 MG/ML Solution Administer 1 Drop into both eyes 2 times a day.      methotrexate 2.5 MG tablet Take 10 mg by mouth every 7 days. 4 tablets on Thursday      folic acid (FOLVITE) 1 MG Tab Take 1 mg by mouth every day.      multivitamin (THERAGRAN) TABS Take 1 Tab by mouth every day.      omeprazole (PRILOSEC) 20 MG CPDR Take 20 mg by mouth every morning.       No current facility-administered medications for this visit.     She  has a past medical history of CATARACT (2009), Diabetes (2007), DJD (degenerative joint disease) of thoracic spine, High cholesterol, Hypertension, Liver disorder, Pain, RA (rheumatoid arthritis) ([facility]), and Urinary incontinence (07/14/2020).    Social History and Family History were reviewed and updated.    ROS   No chest pain, no shortness of breath, no abdominal pain and all other systems were reviewed and are negative.       Objective:     /60 (BP Location: Left arm, Patient Position: Sitting, BP Cuff Size: Adult)   Pulse 69   Temp 36.6 °C (97.8 °F) (Temporal)   Ht 1.549 m (5' 1\")   Wt 62 kg (136 lb 9.6 oz)   SpO2 95%  Body mass index is 25.81 kg/m².   Physical Exam:  Constitutional: Alert, no distress.  Skin: Right cheek there are multiple raised erythematous acne appearing lesions.  2 with whiteheads.  Largest 1 is without a allen but swollen considerably.  Eye: Equal, round and reactive, conjunctiva clear, lids normal.  ENMT: Lips without lesions, good dentition, oropharynx clear.  Neck: Trachea midline, no masses.   Respiratory: Unlabored respiratory effort, lungs clear to auscultation, no wheezes, no ronchi.  Psych: Alert and oriented x3, normal affect and mood.        Assessment and Plan:   The following treatment plan was discussed    1. Primary hypertension  Chronic " condition.  Stable.  Well-controlled.  Continue 3 blood pressure medications.  Renewed losartan.  - losartan (COZAAR) 100 MG Tab; Take 1 Tablet by mouth every day for 360 days.  Dispense: 100 Each; Refill: 3    2. Type 2 diabetes mellitus with other circulatory complication, without long-term current use of insulin (HCC)  Chronic condition.  Likely stable.  Renewed metformin.  Follow-up with PCP in July.  - metformin (GLUCOPHAGE) 1000 MG tablet; Take 1 Tablet by mouth 2 times a day with meals for 360 days.  Dispense: 200 Tablet; Refill: 3    3. Cellulitis of face  Acute, new onset condition.  Appears that they are acne lesions and not vesicles.  Her Shingrix vaccination series is completed.  Provided doxycycline as directed for the next 10 days.  If symptoms are not improving she is to contact me for referral to see dermatology.  - doxycycline (VIBRAMYCIN) 100 MG Tab; Take 1 Tablet by mouth 2 times a day for 10 days.  Dispense: 20 Tablet; Refill: 0    5. Chronic cough  New condition noted in chart but chronic.  Ordered chest x-ray.  Last chest x-ray was done in 2020.  History of postnasal drainage likely the cause of the coughing.  No medication provided today.  Advised to follow-up with her PCP in July.  - DX-CHEST-2 VIEWS; Future         Followup: No follow-ups on file.    Please note that this dictation was created using voice recognition software. I have made every reasonable attempt to correct obvious errors, but I expect that there are errors of grammar and possibly content that I did not discover before finalizing the note.

## 2023-05-25 NOTE — ASSESSMENT & PLAN NOTE
Complains of a chronic history of a cough.  Greater than 3 years.  Diagnosed in the past with postnasal drainage.  Given some cough medication which was not effective.  It was a tablet form.  She denies any chest pain or shortness of breath.  No wheezing.

## 2023-05-25 NOTE — ASSESSMENT & PLAN NOTE
This is a pleasant 82-year-old female accompanied by her , Mitzi.  Taking 3 blood pressure medication today.  We started amlodipine.  Blood pressure well controlled today.

## 2023-07-11 ENCOUNTER — OFFICE VISIT (OUTPATIENT)
Dept: MEDICAL GROUP | Facility: MEDICAL CENTER | Age: 83
End: 2023-07-11
Payer: MEDICARE

## 2023-07-11 VITALS
DIASTOLIC BLOOD PRESSURE: 62 MMHG | HEART RATE: 70 BPM | TEMPERATURE: 97.2 F | BODY MASS INDEX: 25.39 KG/M2 | WEIGHT: 134.48 LBS | HEIGHT: 61 IN | OXYGEN SATURATION: 95 % | SYSTOLIC BLOOD PRESSURE: 120 MMHG

## 2023-07-11 DIAGNOSIS — M54.50 CHRONIC LEFT-SIDED LOW BACK PAIN WITHOUT SCIATICA: ICD-10-CM

## 2023-07-11 DIAGNOSIS — D75.89 MACROCYTOSIS WITHOUT ANEMIA: ICD-10-CM

## 2023-07-11 DIAGNOSIS — E78.00 PURE HYPERCHOLESTEROLEMIA: ICD-10-CM

## 2023-07-11 DIAGNOSIS — R05.3 CHRONIC COUGH: ICD-10-CM

## 2023-07-11 DIAGNOSIS — I10 PRIMARY HYPERTENSION: ICD-10-CM

## 2023-07-11 DIAGNOSIS — E11.59 TYPE 2 DIABETES MELLITUS WITH OTHER CIRCULATORY COMPLICATION, WITHOUT LONG-TERM CURRENT USE OF INSULIN (HCC): ICD-10-CM

## 2023-07-11 DIAGNOSIS — G89.29 CHRONIC LEFT-SIDED LOW BACK PAIN WITHOUT SCIATICA: ICD-10-CM

## 2023-07-11 DIAGNOSIS — M06.9 RHEUMATOID ARTHRITIS INVOLVING MULTIPLE JOINTS (HCC): ICD-10-CM

## 2023-07-11 DIAGNOSIS — Z99.89 USES ROLLER WALKER: ICD-10-CM

## 2023-07-11 DIAGNOSIS — R63.8 OTHER SYMPTOMS AND SIGNS CONCERNING FOOD AND FLUID INTAKE: ICD-10-CM

## 2023-07-11 PROCEDURE — 3078F DIAST BP <80 MM HG: CPT | Performed by: STUDENT IN AN ORGANIZED HEALTH CARE EDUCATION/TRAINING PROGRAM

## 2023-07-11 PROCEDURE — 3074F SYST BP LT 130 MM HG: CPT | Performed by: STUDENT IN AN ORGANIZED HEALTH CARE EDUCATION/TRAINING PROGRAM

## 2023-07-11 PROCEDURE — 99214 OFFICE O/P EST MOD 30 MIN: CPT | Performed by: STUDENT IN AN ORGANIZED HEALTH CARE EDUCATION/TRAINING PROGRAM

## 2023-07-11 RX ORDER — GABAPENTIN 300 MG/1
CAPSULE ORAL
COMMUNITY
End: 2024-03-21

## 2023-07-11 RX ORDER — SPIRONOLACTONE 25 MG/1
TABLET ORAL
COMMUNITY
End: 2024-03-21

## 2023-07-11 RX ORDER — HYDROCODONE BITARTRATE AND ACETAMINOPHEN 5; 325 MG/1; MG/1
TABLET ORAL
COMMUNITY
End: 2024-03-21

## 2023-07-11 RX ORDER — MELOXICAM 15 MG/1
TABLET ORAL
COMMUNITY
End: 2024-03-21

## 2023-07-11 RX ORDER — AMLODIPINE BESYLATE 5 MG/1
1 TABLET ORAL DAILY
COMMUNITY
End: 2024-03-02

## 2023-07-11 RX ORDER — AZELASTINE HCL 205.5 UG/1
SPRAY NASAL
COMMUNITY
End: 2023-12-27

## 2023-07-11 ASSESSMENT — FIBROSIS 4 INDEX: FIB4 SCORE: 1.62

## 2023-07-11 NOTE — PROGRESS NOTES
Subjective:     CC: chronic conditions follow up    HPI:   Jewell presents today for chronic conditions follow up    Problem   Macrocytosis Without Anemia    Chronic condition.     Chronic Left-Sided Low Back Pain Without Sciatica    Chronic condition.    Character: aching pain  Onset: many years  Location: low back  Duration: intermittent  Exacerbating factors: walking  Relieving factors: resting  Associated symptoms: none  Severity: persistent     Uses Roller Walker    Chronic condition. She uses 3WW on a daily basis to assist with ambulation.     Chronic Cough    Chronic condition.    Character: dry cough  Onset: many years  Location: n/a  Duration: intermittent  Exacerbating factors: sitting in car  Relieving factors: none  Associated symptoms: none  Severity: persistent     Rheumatoid Arthritis Involving Multiple Joints (Hcc)    Chronic condition. Followed by rheumatology Dr. Acosta.    Current regimen: methotrexate 2.5mg weekly and folic acid 1mg daily  She reports compliance and/or tolerance and symptoms controlled with current medication(s). Does not need medication(s) refill. No acute concerns.       Hld (Hyperlipidemia)    Chronic condition.  Current regimen: atorvastatin 40mg daily   She reports compliance and tolerating medication well. Denies musculoskeletal pain, headache, diarrhea. She does not need medication refill today. No acute concerns at this time.       Type 2 Diabetes Mellitus With Circulatory Disorder, Without Long-Term Current Use of Insulin (Hcc)    Chronic condition. Onset around around age 62.  Current medication regimen: metformin 1000mg BID, Actos 15mg daily  Patient tolerating and reports compliant with medications. Does not need refill of medications today. Denies vision changes, dry mouth, chest pain, nausea/vomiting/diarrhea, polydipsia, polyphagia, polyuria, hypoglycemia, numbness/tingling. She checks her feet at home.  Last eye exam: due, she has appointment next month, no DR from  before  Last foot exam: due  Diet: tries to eat healthy in general  Exercise: stretching  Vaccines: flu 11/2021, PPSV23 completed 12/2008, Tdap received 04/2019       Hypertension    Chronic condition.  Current medication regimen: amlodipine 5mg daily, HCTZ 12.5mg daily, losartan 100mg daily, spironolactone 25mg daily  Patient tolerating and reports compliant with medications. She does not regularly monitor blood pressure at home. Does not need refill of medications today. Denies headache, dizziness, vision changes, chest pain, dyspnea, edema.           Current Outpatient Medications Ordered in Epic   Medication Sig Dispense Refill    pioglitazone (ACTOS) 15 MG Tab Take 1 tablet by mouth once daily 100 Tablet 3    amLODIPine (NORVASC) 5 MG Tab Take 1 Tablet by mouth every day.      Azelastine HCl 0.15 % Solution       gabapentin (NEURONTIN) 300 MG Cap       HYDROcodone-acetaminophen (NORCO) 5-325 MG Tab per tablet       meloxicam (MOBIC) 15 MG tablet       spironolactone (ALDACTONE) 25 MG Tab       amLODIPine (NORVASC) 5 MG Tab Take 5 mg by mouth every day.      metformin (GLUCOPHAGE) 1000 MG tablet Take 1 Tablet by mouth 2 times a day with meals for 360 days. 200 Tablet 3    losartan (COZAAR) 100 MG Tab Take 1 Tablet by mouth every day for 360 days. 100 Each 3    hydrochlorothiazide (MICROZIDE) 12.5 MG capsule Take 1 Capsule by mouth every day. 100 Capsule 3    CALCIUM PO Take 1 Tablet by mouth 2 times a day.      Cholecalciferol (D3 PO) Take 1 Capsule by mouth every evening.      Ferrous Sulfate (IRON PO) Take 1 Tablet by mouth every day. OTC      atorvastatin (LIPITOR) 40 MG Tab Take 1 Tablet by mouth every evening. 100 Tablet 3    glucose blood strip 1 Strip by Other route every day. 100 Strip 3    dorzolamide-timolol (COSOPT) 22.3-6.8 MG/ML Solution Administer 1 Drop into both eyes 2 times a day.      methotrexate 2.5 MG tablet Take 10 mg by mouth every 7 days. 4 tablets on Thursday      folic acid (FOLVITE) 1  "MG Tab Take 1 mg by mouth every day.      multivitamin (THERAGRAN) TABS Take 1 Tab by mouth every day.      omeprazole (PRILOSEC) 20 MG CPDR Take 20 mg by mouth every morning.       No current [facility]-ordered facility-administered medications on file.     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    Gen: Alert and oriented, No apparent distress.  Lungs: Normal effort, CTA bilaterally, no wheezes, rhonchi, or rales  CV: Regular rate and rhythm. No murmurs, rubs, or gallops.  Ext: No clubbing, cyanosis, edema.    Labs:   Lab Results   Component Value Date/Time    WBC 5.5 05/01/2023 10:57 AM    RBC 3.86 (L) 05/01/2023 10:57 AM    HEMOGLOBIN 12.9 05/01/2023 10:57 AM    HEMATOCRIT 39.7 05/01/2023 10:57 AM    .8 (H) 05/01/2023 10:57 AM    MCH 33.4 (H) 05/01/2023 10:57 AM    MCHC 32.5 (L) 05/01/2023 10:57 AM    RDW 54.2 (H) 05/01/2023 10:57 AM    PLATELETCT 264 05/01/2023 10:57 AM    MPV 9.5 05/01/2023 10:57 AM      Lab Results   Component Value Date/Time    SODIUM 138 05/01/2023 10:57 AM    POTASSIUM 5.1 05/01/2023 10:57 AM    CHLORIDE 105 05/01/2023 10:57 AM    CO2 22 05/01/2023 10:57 AM    ANION 11.0 05/01/2023 10:57 AM    GLUCOSE 145 (H) 05/01/2023 10:57 AM    BUN 28 (H) 05/01/2023 10:57 AM    CREATININE 1.03 05/01/2023 10:57 AM    CALCIUM 10.0 05/01/2023 10:57 AM    ASTSGOT 19 05/01/2023 10:57 AM    ALTSGPT 13 05/01/2023 10:57 AM    TBILIRUBIN 0.5 05/01/2023 10:57 AM    ALBUMIN 4.4 05/01/2023 10:57 AM    TOTPROTEIN " 7.8 05/01/2023 10:57 AM    GLOBULIN 3.4 05/01/2023 10:57 AM    AGRATIO 1.3 05/01/2023 10:57 AM     Lab Results   Component Value Date/Time    HBA1C 6.8 (H) 01/10/2023 09:37 AM        Assessment & Plan:     83 y.o. female with the following -     1. Type 2 diabetes mellitus with other circulatory complication, without long-term current use of insulin (HCC)  Chronic condition, controlled with medications. Most recent A1C 6.8.  - cont current medications: metformin 1000mg BID, Actos 15mg daily  - Discussed daily self foot exam, eye care, and regular exercise with patient  - Patient instructed to follow a low carbohydrate diet  - Follow up in 3 months or earlier as needed  - HEMOGLOBIN A1C; Future    2. Primary hypertension  Chronic condition, stable.  - cont current regimen: amlodipine 5mg daily, HCTZ 12.5mg daily, losartan 100mg daily, spironolactone 25mg daily  - Comp Metabolic Panel; Future    3. Pure hypercholesterolemia  Chronic condition, stable.  - cont current regimen: atorvastatin 40mg daily   - Lipid Profile; Future  - TSH WITH REFLEX TO FT4; Future    4. Rheumatoid arthritis involving multiple joints (HCC)  Chronic condition, stable. Followed by rheumatology Dr. Acosta.  - cont current regimen: methotrexate 2.5mg weekly and folic acid 1mg daily  - CBC WITH DIFFERENTIAL; Future    5. Macrocytosis without anemia  Chronic condition. Plan to evaluate with labs per orders.  - CBC WITH DIFFERENTIAL; Future  - VITAMIN B12; Future  - FOLATE; Future  - IRON/TOTAL IRON BIND; Future  - FERRITIN; Future    6. Other symptoms and signs concerning food and fluid intake  - IRON/TOTAL IRON BIND; Future  - FERRITIN; Future    7. Chronic cough  Chronic condition, persistent. Chest X-ray normal. Suspect possible environmental allergies. Refer to ENT for further evaluation and/or management.  - Referral to ENT    8. Chronic left-sided low back pain without sciatica  Chronic condition, persistent. History of lumbar laminectomy in  2010. Plan to evaluate with X-ray per order.  - DX-LUMBAR SPINE-4+ VIEWS; Future    9. Uses roller walker  Patient requesting new 3-wheeled walker.  - DME Walker      Return in about 6 months (around 1/11/2024) for chronic medical conditions.    Patient is aware that I will be leaving [facility] at the end of September and that she will need to establish with a new primary care provider. It has been a real privilege serving as her family doctor.    Please note that this dictation was created using voice recognition software. I have made every reasonable attempt to correct obvious errors, but I expect that there are errors of grammar and possibly content that I did not discover before finalizing the note.

## 2023-07-14 ENCOUNTER — DOCUMENTATION (OUTPATIENT)
Dept: HEALTH INFORMATION MANAGEMENT | Facility: OTHER | Age: 83
End: 2023-07-14
Payer: MEDICARE

## 2023-07-20 ENCOUNTER — HOSPITAL ENCOUNTER (OUTPATIENT)
Dept: LAB | Facility: MEDICAL CENTER | Age: 83
End: 2023-07-20
Attending: INTERNAL MEDICINE
Payer: MEDICARE

## 2023-07-20 LAB
ALT SERPL-CCNC: 16 U/L (ref 2–50)
AST SERPL-CCNC: 26 U/L (ref 12–45)
BASOPHILS # BLD AUTO: 0.5 % (ref 0–1.8)
BASOPHILS # BLD: 0.03 K/UL (ref 0–0.12)
CREAT SERPL-MCNC: 0.79 MG/DL (ref 0.5–1.4)
EOSINOPHIL # BLD AUTO: 0.14 K/UL (ref 0–0.51)
EOSINOPHIL NFR BLD: 2.5 % (ref 0–6.9)
ERYTHROCYTE [DISTWIDTH] IN BLOOD BY AUTOMATED COUNT: 55.5 FL (ref 35.9–50)
ERYTHROCYTE [SEDIMENTATION RATE] IN BLOOD BY WESTERGREN METHOD: 24 MM/HOUR (ref 0–25)
GFR SERPLBLD CREATININE-BSD FMLA CKD-EPI: 75 ML/MIN/1.73 M 2
HCT VFR BLD AUTO: 38.4 % (ref 37–47)
HGB BLD-MCNC: 12.4 G/DL (ref 12–16)
IMM GRANULOCYTES # BLD AUTO: 0.02 K/UL (ref 0–0.11)
IMM GRANULOCYTES NFR BLD AUTO: 0.4 % (ref 0–0.9)
LYMPHOCYTES # BLD AUTO: 1.41 K/UL (ref 1–4.8)
LYMPHOCYTES NFR BLD: 25.2 % (ref 22–41)
MCH RBC QN AUTO: 34.2 PG (ref 27–33)
MCHC RBC AUTO-ENTMCNC: 32.3 G/DL (ref 32.2–35.5)
MCV RBC AUTO: 105.8 FL (ref 81.4–97.8)
MONOCYTES # BLD AUTO: 0.64 K/UL (ref 0–0.85)
MONOCYTES NFR BLD AUTO: 11.4 % (ref 0–13.4)
NEUTROPHILS # BLD AUTO: 3.35 K/UL (ref 1.82–7.42)
NEUTROPHILS NFR BLD: 60 % (ref 44–72)
NRBC # BLD AUTO: 0 K/UL
NRBC BLD-RTO: 0 /100 WBC (ref 0–0.2)
PLATELET # BLD AUTO: 256 K/UL (ref 164–446)
PMV BLD AUTO: 9.1 FL (ref 9–12.9)
RBC # BLD AUTO: 3.63 M/UL (ref 4.2–5.4)
WBC # BLD AUTO: 5.6 K/UL (ref 4.8–10.8)

## 2023-07-20 PROCEDURE — 85025 COMPLETE CBC W/AUTO DIFF WBC: CPT

## 2023-07-20 PROCEDURE — 84450 TRANSFERASE (AST) (SGOT): CPT

## 2023-07-20 PROCEDURE — 85652 RBC SED RATE AUTOMATED: CPT

## 2023-07-20 PROCEDURE — 82565 ASSAY OF CREATININE: CPT

## 2023-07-20 PROCEDURE — 84460 ALANINE AMINO (ALT) (SGPT): CPT

## 2023-07-20 PROCEDURE — 36415 COLL VENOUS BLD VENIPUNCTURE: CPT

## 2023-07-26 ENCOUNTER — APPOINTMENT (OUTPATIENT)
Dept: RADIOLOGY | Facility: MEDICAL CENTER | Age: 83
End: 2023-07-26
Attending: STUDENT IN AN ORGANIZED HEALTH CARE EDUCATION/TRAINING PROGRAM
Payer: MEDICARE

## 2023-07-26 DIAGNOSIS — M54.50 CHRONIC LEFT-SIDED LOW BACK PAIN WITHOUT SCIATICA: ICD-10-CM

## 2023-07-26 DIAGNOSIS — G89.29 CHRONIC LEFT-SIDED LOW BACK PAIN WITHOUT SCIATICA: ICD-10-CM

## 2023-07-26 PROCEDURE — 72110 X-RAY EXAM L-2 SPINE 4/>VWS: CPT

## 2023-07-31 ENCOUNTER — OFFICE VISIT (OUTPATIENT)
Dept: MEDICAL GROUP | Facility: MEDICAL CENTER | Age: 83
End: 2023-07-31
Payer: MEDICARE

## 2023-07-31 ENCOUNTER — HOSPITAL ENCOUNTER (OUTPATIENT)
Dept: LAB | Facility: MEDICAL CENTER | Age: 83
End: 2023-07-31
Attending: NURSE PRACTITIONER
Payer: MEDICARE

## 2023-07-31 VITALS
HEIGHT: 61 IN | WEIGHT: 132.2 LBS | DIASTOLIC BLOOD PRESSURE: 60 MMHG | OXYGEN SATURATION: 94 % | HEART RATE: 74 BPM | BODY MASS INDEX: 24.96 KG/M2 | TEMPERATURE: 97.7 F | SYSTOLIC BLOOD PRESSURE: 100 MMHG

## 2023-07-31 DIAGNOSIS — Z87.19 HISTORY OF ACUTE PANCREATITIS: ICD-10-CM

## 2023-07-31 DIAGNOSIS — R10.12 LUQ PAIN: ICD-10-CM

## 2023-07-31 DIAGNOSIS — K86.89 PANCREATIC DUCT CALCULUS: ICD-10-CM

## 2023-07-31 PROBLEM — R10.32 LLQ PAIN: Status: ACTIVE | Noted: 2023-07-31

## 2023-07-31 LAB
ALBUMIN SERPL BCP-MCNC: 4.3 G/DL (ref 3.2–4.9)
ALBUMIN/GLOB SERPL: 1.2 G/DL
ALP SERPL-CCNC: 97 U/L (ref 30–99)
ALT SERPL-CCNC: 15 U/L (ref 2–50)
ANION GAP SERPL CALC-SCNC: 12 MMOL/L (ref 7–16)
AST SERPL-CCNC: 21 U/L (ref 12–45)
BASOPHILS # BLD AUTO: 0.3 % (ref 0–1.8)
BASOPHILS # BLD: 0.03 K/UL (ref 0–0.12)
BILIRUB SERPL-MCNC: 0.7 MG/DL (ref 0.1–1.5)
BUN SERPL-MCNC: 26 MG/DL (ref 8–22)
CALCIUM ALBUM COR SERPL-MCNC: 10.1 MG/DL (ref 8.5–10.5)
CALCIUM SERPL-MCNC: 10.3 MG/DL (ref 8.4–10.2)
CHLORIDE SERPL-SCNC: 101 MMOL/L (ref 96–112)
CO2 SERPL-SCNC: 23 MMOL/L (ref 20–33)
CREAT SERPL-MCNC: 0.82 MG/DL (ref 0.5–1.4)
EOSINOPHIL # BLD AUTO: 0.08 K/UL (ref 0–0.51)
EOSINOPHIL NFR BLD: 0.8 % (ref 0–6.9)
ERYTHROCYTE [DISTWIDTH] IN BLOOD BY AUTOMATED COUNT: 53.1 FL (ref 35.9–50)
GFR SERPLBLD CREATININE-BSD FMLA CKD-EPI: 72 ML/MIN/1.73 M 2
GLOBULIN SER CALC-MCNC: 3.6 G/DL (ref 1.9–3.5)
GLUCOSE SERPL-MCNC: 104 MG/DL (ref 65–99)
HCT VFR BLD AUTO: 40.5 % (ref 37–47)
HGB BLD-MCNC: 13 G/DL (ref 12–16)
IMM GRANULOCYTES # BLD AUTO: 0.02 K/UL (ref 0–0.11)
IMM GRANULOCYTES NFR BLD AUTO: 0.2 % (ref 0–0.9)
LIPASE SERPL-CCNC: 32 U/L (ref 11–82)
LYMPHOCYTES # BLD AUTO: 1.61 K/UL (ref 1–4.8)
LYMPHOCYTES NFR BLD: 16.9 % (ref 22–41)
MCH RBC QN AUTO: 33.8 PG (ref 27–33)
MCHC RBC AUTO-ENTMCNC: 32.1 G/DL (ref 32.2–35.5)
MCV RBC AUTO: 105.2 FL (ref 81.4–97.8)
MONOCYTES # BLD AUTO: 0.84 K/UL (ref 0–0.85)
MONOCYTES NFR BLD AUTO: 8.8 % (ref 0–13.4)
NEUTROPHILS # BLD AUTO: 6.95 K/UL (ref 1.82–7.42)
NEUTROPHILS NFR BLD: 73 % (ref 44–72)
NRBC # BLD AUTO: 0 K/UL
NRBC BLD-RTO: 0 /100 WBC (ref 0–0.2)
PLATELET # BLD AUTO: 271 K/UL (ref 164–446)
PMV BLD AUTO: 8.9 FL (ref 9–12.9)
POTASSIUM SERPL-SCNC: 5 MMOL/L (ref 3.6–5.5)
PROT SERPL-MCNC: 7.9 G/DL (ref 6–8.2)
RBC # BLD AUTO: 3.85 M/UL (ref 4.2–5.4)
SODIUM SERPL-SCNC: 136 MMOL/L (ref 135–145)
WBC # BLD AUTO: 9.5 K/UL (ref 4.8–10.8)

## 2023-07-31 PROCEDURE — 99214 OFFICE O/P EST MOD 30 MIN: CPT | Performed by: NURSE PRACTITIONER

## 2023-07-31 PROCEDURE — 36415 COLL VENOUS BLD VENIPUNCTURE: CPT

## 2023-07-31 PROCEDURE — 3074F SYST BP LT 130 MM HG: CPT | Performed by: NURSE PRACTITIONER

## 2023-07-31 PROCEDURE — 80053 COMPREHEN METABOLIC PANEL: CPT

## 2023-07-31 PROCEDURE — 3078F DIAST BP <80 MM HG: CPT | Performed by: NURSE PRACTITIONER

## 2023-07-31 PROCEDURE — 85025 COMPLETE CBC W/AUTO DIFF WBC: CPT

## 2023-07-31 PROCEDURE — 83690 ASSAY OF LIPASE: CPT

## 2023-07-31 ASSESSMENT — FIBROSIS 4 INDEX: FIB4 SCORE: 2.06

## 2023-07-31 NOTE — ASSESSMENT & PLAN NOTE
Started two nights ago with sharp pain and passing more gas, diarrhea. No worsening of pain prior to BM. She is feeling better today.     Denies nausea, vomiting, fevers, chills, body aches, malaise, dysuria.     Took tylenol which helped.     She has a known history of pancreatic duct calculus with multiple failed attempts at removal by GI via ERCP. Has history of pancreatitis related to these procedures. Was referred to [facility] for removal if pain was recurrent.

## 2023-08-03 PROBLEM — R10.12 LUQ PAIN: Status: ACTIVE | Noted: 2023-07-31

## 2023-08-04 NOTE — PROGRESS NOTES
Subjective:   Jewell Diaz is a 83 y.o. female here today for abdominal pain    LUQ pain  Started two nights ago with sharp pain and passing more gas, diarrhea. No worsening of pain prior to BM. She is feeling better today.     Denies nausea, vomiting, fevers, chills, body aches, malaise, dysuria.     Took tylenol which helped.     She has a known history of pancreatic duct calculus with multiple failed attempts at removal by GI via ERCP. Has history of pancreatitis related to these procedures. Was referred to [facility] for removal if pain was recurrent.      Current medicines (including changes today)  Current Outpatient Medications   Medication Sig Dispense Refill    pioglitazone (ACTOS) 15 MG Tab Take 1 tablet by mouth once daily 100 Tablet 3    amLODIPine (NORVASC) 5 MG Tab Take 1 Tablet by mouth every day.      Azelastine HCl 0.15 % Solution       gabapentin (NEURONTIN) 300 MG Cap       HYDROcodone-acetaminophen (NORCO) 5-325 MG Tab per tablet       meloxicam (MOBIC) 15 MG tablet       spironolactone (ALDACTONE) 25 MG Tab       amLODIPine (NORVASC) 5 MG Tab Take 5 mg by mouth every day.      metformin (GLUCOPHAGE) 1000 MG tablet Take 1 Tablet by mouth 2 times a day with meals for 360 days. 200 Tablet 3    losartan (COZAAR) 100 MG Tab Take 1 Tablet by mouth every day for 360 days. 100 Each 3    hydrochlorothiazide (MICROZIDE) 12.5 MG capsule Take 1 Capsule by mouth every day. 100 Capsule 3    CALCIUM PO Take 1 Tablet by mouth 2 times a day.      Cholecalciferol (D3 PO) Take 1 Capsule by mouth every evening.      Ferrous Sulfate (IRON PO) Take 1 Tablet by mouth every day. OTC      atorvastatin (LIPITOR) 40 MG Tab Take 1 Tablet by mouth every evening. 100 Tablet 3    glucose blood strip 1 Strip by Other route every day. 100 Strip 3    dorzolamide-timolol (COSOPT) 22.3-6.8 MG/ML Solution Administer 1 Drop into both eyes 2 times a day.      methotrexate 2.5 MG tablet Take 10 mg by mouth every 7 days.  "4 tablets on Thursday      folic acid (FOLVITE) 1 MG Tab Take 1 mg by mouth every day.      multivitamin (THERAGRAN) TABS Take 1 Tab by mouth every day.      omeprazole (PRILOSEC) 20 MG CPDR Take 20 mg by mouth every morning.       No current facility-administered medications for this visit.     She  has a past medical history of CATARACT (2009), Diabetes (2007), DJD (degenerative joint disease) of thoracic spine, High cholesterol, Hypertension, Liver disorder, Pain, RA (rheumatoid arthritis) (HCC), and Urinary incontinence (07/14/2020).    ROS   No chest pain, no shortness of breath, no abdominal pain  Positive ROS as per HPI.  All other systems reviewed and are negative.     Objective:     /60   Pulse 74   Temp 36.5 °C (97.7 °F) (Temporal)   Ht 1.549 m (5' 1\")   Wt 60 kg (132 lb 3.2 oz)   SpO2 94%  Body mass index is 24.98 kg/m².     Physical Exam:  Constitutional: Alert, no distress.  Skin: Warm, dry, good turgor, no rashes in visible areas.  Eye: Equal, round and reactive, conjunctiva clear, lids normal.  ENMT: Lips without lesions, good dentition,  Respiratory: Unlabored respiratory effort, lungs clear to auscultation, no wheezes, no ronchi.  Cardiovascular: Normal S1, S2, no murmur, no edema.  Abdomen: Soft, LUQ and epigastric tenderness, no masses,  Psych: Alert and oriented x3, normal affect and mood.        Assessment and Plan:   The following treatment plan was discussed    1. LUQ pain  Unstable  GERD vs recurrence of pancreatitis  Check labs today to eval for acute flare of pancreatitis  Continue follow up with GI for recurrent LUQ symptoms  Strict ER precautions advised  - LIPASE; Future  - CBC WITH DIFFERENTIAL; Future  - Comp Metabolic Panel; Future    2. Pancreatic duct calculus  - LIPASE; Future    3. History of acute pancreatitis  - LIPASE; Future      Followup: Return if symptoms worsen or fail to improve.    The MA is performing the above orders under the direction of  " Cassidy    Please note that this dictation was created using voice recognition software. I have made every reasonable attempt to correct obvious errors, but I expect that there are errors of grammar and possibly content that I did not discover before finalizing the note.

## 2023-10-05 ENCOUNTER — HOSPITAL ENCOUNTER (OUTPATIENT)
Dept: LAB | Facility: MEDICAL CENTER | Age: 83
End: 2023-10-05
Attending: INTERNAL MEDICINE
Payer: MEDICARE

## 2023-10-05 LAB
ALBUMIN SERPL BCP-MCNC: 4.3 G/DL (ref 3.2–4.9)
ALT SERPL-CCNC: 21 U/L (ref 2–50)
AST SERPL-CCNC: 25 U/L (ref 12–45)
BASOPHILS # BLD AUTO: 0.4 % (ref 0–1.8)
BASOPHILS # BLD: 0.02 K/UL (ref 0–0.12)
CREAT SERPL-MCNC: 0.75 MG/DL (ref 0.5–1.4)
EOSINOPHIL # BLD AUTO: 0.14 K/UL (ref 0–0.51)
EOSINOPHIL NFR BLD: 2.7 % (ref 0–6.9)
ERYTHROCYTE [DISTWIDTH] IN BLOOD BY AUTOMATED COUNT: 54.2 FL (ref 35.9–50)
ERYTHROCYTE [SEDIMENTATION RATE] IN BLOOD BY WESTERGREN METHOD: 18 MM/HOUR (ref 0–25)
GFR SERPLBLD CREATININE-BSD FMLA CKD-EPI: 80 ML/MIN/1.73 M 2
HCT VFR BLD AUTO: 41.1 % (ref 37–47)
HGB BLD-MCNC: 13.3 G/DL (ref 12–16)
IMM GRANULOCYTES # BLD AUTO: 0 K/UL (ref 0–0.11)
IMM GRANULOCYTES NFR BLD AUTO: 0 % (ref 0–0.9)
LYMPHOCYTES # BLD AUTO: 1.4 K/UL (ref 1–4.8)
LYMPHOCYTES NFR BLD: 27.1 % (ref 22–41)
MCH RBC QN AUTO: 34.4 PG (ref 27–33)
MCHC RBC AUTO-ENTMCNC: 32.4 G/DL (ref 32.2–35.5)
MCV RBC AUTO: 106.2 FL (ref 81.4–97.8)
MONOCYTES # BLD AUTO: 0.68 K/UL (ref 0–0.85)
MONOCYTES NFR BLD AUTO: 13.2 % (ref 0–13.4)
NEUTROPHILS # BLD AUTO: 2.93 K/UL (ref 1.82–7.42)
NEUTROPHILS NFR BLD: 56.6 % (ref 44–72)
NRBC # BLD AUTO: 0 K/UL
NRBC BLD-RTO: 0 /100 WBC (ref 0–0.2)
PLATELET # BLD AUTO: 245 K/UL (ref 164–446)
PMV BLD AUTO: 9.3 FL (ref 9–12.9)
RBC # BLD AUTO: 3.87 M/UL (ref 4.2–5.4)
WBC # BLD AUTO: 5.2 K/UL (ref 4.8–10.8)

## 2023-10-05 PROCEDURE — 82565 ASSAY OF CREATININE: CPT

## 2023-10-05 PROCEDURE — 85025 COMPLETE CBC W/AUTO DIFF WBC: CPT

## 2023-10-05 PROCEDURE — 36415 COLL VENOUS BLD VENIPUNCTURE: CPT

## 2023-10-05 PROCEDURE — 84450 TRANSFERASE (AST) (SGOT): CPT

## 2023-10-05 PROCEDURE — 82040 ASSAY OF SERUM ALBUMIN: CPT

## 2023-10-05 PROCEDURE — 84460 ALANINE AMINO (ALT) (SGPT): CPT

## 2023-10-05 PROCEDURE — 85652 RBC SED RATE AUTOMATED: CPT

## 2023-10-17 ENCOUNTER — TELEPHONE (OUTPATIENT)
Dept: HEALTH INFORMATION MANAGEMENT | Facility: OTHER | Age: 83
End: 2023-10-17

## 2023-11-21 ENCOUNTER — OFFICE VISIT (OUTPATIENT)
Dept: MEDICAL GROUP | Facility: PHYSICIAN GROUP | Age: 83
End: 2023-11-21
Payer: MEDICARE

## 2023-11-21 VITALS
WEIGHT: 139.5 LBS | SYSTOLIC BLOOD PRESSURE: 126 MMHG | RESPIRATION RATE: 14 BRPM | OXYGEN SATURATION: 94 % | HEART RATE: 68 BPM | HEIGHT: 61 IN | BODY MASS INDEX: 26.34 KG/M2 | TEMPERATURE: 98.4 F | DIASTOLIC BLOOD PRESSURE: 62 MMHG

## 2023-11-21 DIAGNOSIS — Z23 NEED FOR VACCINATION: ICD-10-CM

## 2023-11-21 DIAGNOSIS — E11.59 TYPE 2 DIABETES MELLITUS WITH OTHER CIRCULATORY COMPLICATION, WITHOUT LONG-TERM CURRENT USE OF INSULIN (HCC): ICD-10-CM

## 2023-11-21 DIAGNOSIS — F51.04 CHRONIC INSOMNIA: ICD-10-CM

## 2023-11-21 DIAGNOSIS — Z76.89 ENCOUNTER TO ESTABLISH CARE WITH NEW DOCTOR: ICD-10-CM

## 2023-11-21 DIAGNOSIS — R53.83 OTHER FATIGUE: ICD-10-CM

## 2023-11-21 DIAGNOSIS — E78.00 PURE HYPERCHOLESTEROLEMIA: ICD-10-CM

## 2023-11-21 DIAGNOSIS — L03.114 CELLULITIS OF LEFT WRIST: ICD-10-CM

## 2023-11-21 PROBLEM — E26.1 SECONDARY HYPERALDOSTERONISM (HCC): Status: ACTIVE | Noted: 2023-11-21

## 2023-11-21 LAB
HBA1C MFR BLD: 6.4 % (ref ?–5.8)
POCT INT CON NEG: NEGATIVE
POCT INT CON POS: POSITIVE

## 2023-11-21 PROCEDURE — G0010 ADMIN HEPATITIS B VACCINE: HCPCS | Performed by: STUDENT IN AN ORGANIZED HEALTH CARE EDUCATION/TRAINING PROGRAM

## 2023-11-21 PROCEDURE — 90746 HEPB VACCINE 3 DOSE ADULT IM: CPT | Performed by: STUDENT IN AN ORGANIZED HEALTH CARE EDUCATION/TRAINING PROGRAM

## 2023-11-21 PROCEDURE — 83036 HEMOGLOBIN GLYCOSYLATED A1C: CPT | Performed by: STUDENT IN AN ORGANIZED HEALTH CARE EDUCATION/TRAINING PROGRAM

## 2023-11-21 PROCEDURE — 3078F DIAST BP <80 MM HG: CPT | Performed by: STUDENT IN AN ORGANIZED HEALTH CARE EDUCATION/TRAINING PROGRAM

## 2023-11-21 PROCEDURE — 3074F SYST BP LT 130 MM HG: CPT | Performed by: STUDENT IN AN ORGANIZED HEALTH CARE EDUCATION/TRAINING PROGRAM

## 2023-11-21 PROCEDURE — 99205 OFFICE O/P NEW HI 60 MIN: CPT | Mod: 25 | Performed by: STUDENT IN AN ORGANIZED HEALTH CARE EDUCATION/TRAINING PROGRAM

## 2023-11-21 RX ORDER — LIDOCAINE 50 MG/G
OINTMENT TOPICAL
COMMUNITY
Start: 2023-10-23

## 2023-11-21 RX ORDER — ATORVASTATIN CALCIUM 40 MG/1
40 TABLET, FILM COATED ORAL NIGHTLY
Qty: 100 TABLET | Refills: 3 | COMMUNITY
Start: 2023-11-21 | End: 2023-12-27

## 2023-11-21 RX ORDER — AMOXICILLIN 500 MG/1
500 CAPSULE ORAL 3 TIMES DAILY
Qty: 15 CAPSULE | Refills: 0 | Status: SHIPPED | OUTPATIENT
Start: 2023-11-21 | End: 2023-11-26

## 2023-11-21 ASSESSMENT — ENCOUNTER SYMPTOMS
SPEECH CHANGE: 0
POLYDIPSIA: 0
DIARRHEA: 0
TINGLING: 0
WEIGHT LOSS: 0
HALLUCINATIONS: 0
HEADACHES: 0
SHORTNESS OF BREATH: 0
BRUISES/BLEEDS EASILY: 0
MYALGIAS: 0
NAUSEA: 0
EYE PAIN: 0
WEAKNESS: 0
COUGH: 0
EYE DISCHARGE: 0
DIAPHORESIS: 0
FEVER: 0
SENSORY CHANGE: 0
LOSS OF CONSCIOUSNESS: 0
HEARTBURN: 0
PND: 0
CHILLS: 0
BLOOD IN STOOL: 0
FOCAL WEAKNESS: 0
CONSTIPATION: 0
CLAUDICATION: 0
DIZZINESS: 0
SEIZURES: 0
BACK PAIN: 1
FLANK PAIN: 0
NECK PAIN: 0
ABDOMINAL PAIN: 0
BLURRED VISION: 0
PALPITATIONS: 0
DEPRESSION: 0
WHEEZING: 0
SPUTUM PRODUCTION: 0
HEMOPTYSIS: 0
DOUBLE VISION: 0
ORTHOPNEA: 0
VOMITING: 0

## 2023-11-21 ASSESSMENT — FIBROSIS 4 INDEX: FIB4 SCORE: 1.8

## 2023-11-21 ASSESSMENT — LIFESTYLE VARIABLES: SUBSTANCE_ABUSE: 0

## 2023-11-21 NOTE — LETTER
[facility]  Renny Desouza M.D.  [address]  [place] [address]  Fax: [phone]   Authorization for Release/Disclosure of   Protected Health Information   Name: JEWELL HERNADEZ : 1940 SSN: [id number]   Address: [address] [place] [address]  [place] [address] Phone:    [phone] (home) [phone] (work)   I authorize the entity listed below to release/disclose the PHI below to:   [facility]/Renny Desouza M.D. and Renny Desouza M.D.   Provider or Entity Name:     Address   City, State, Zip   Phone:      Fax:     Reason for request: continuity of care   Information to be released:    [  ] LAST COLONOSCOPY,  including any PATH REPORT and follow-up  [  ] LAST FIT/COLOGUARD RESULT [  ] LAST DEXA  [  ] LAST MAMMOGRAM  [  ] LAST PAP  [  ] LAST LABS [  ] RETINA EXAM REPORT  [  ] IMMUNIZATION RECORDS  [  ] Release all info      [  ] Check here and initial the line next to each item to release ALL health information INCLUDING  _____ Care and treatment for drug and / or alcohol abuse  _____ HIV testing, infection status, or AIDS  _____ Genetic Testing    DATES OF SERVICE OR TIME PERIOD TO BE DISCLOSED: _____________  I understand and acknowledge that:  * This Authorization may be revoked at any time by you in writing, except if your health information has already been used or disclosed.  * Your health information that will be used or disclosed as a result of you signing this authorization could be re-disclosed by the recipient. If this occurs, your re-disclosed health information may no longer be protected by State or Federal laws.  * You may refuse to sign this Authorization. Your refusal will not affect your ability to obtain treatment.  * This Authorization becomes effective upon signing and will  on (date) __________.      If no date is indicated, this Authorization will  one (1) year from the signature date.    Name: Jewell Hernadez  Signature: Date:   2023     PLEASE  FAX REQUESTED RECORDS BACK TO: [phone]

## 2023-11-22 NOTE — PROGRESS NOTES
CC:  Diagnoses of Type 2 diabetes mellitus with other circulatory complication, without long-term current use of insulin (HCC), Pure hypercholesterolemia, Cellulitis of left wrist, Need for vaccination, Chronic insomnia, and Other fatigue were pertinent to this visit.    HISTORY OF THE PRESENT ILLNESS: Patient is a 83 y.o. female. This pleasant patient is here today to establish care and discuss problems listed below. His/her prior PCP was Philippe Matthew. Accompanied by her .  Denies acute issues.    Problem   Cellulitis of Left Wrist     #cellulitis on hand  Has superimposed cellulitis on top of left hand laceration. Had laceration from recent fall, was mechanical fall.  Denies loss of consciousness, Denies chest pain, palpitation, shortness of breath, weakness, dizziness/lightheadedness, facial droop, paresthesia, vision change     #Insomnia  Has had chronic insomnia forever according to patient  Tried different sleeping pills: Trazodone 50 mg and zolpidem 5 mg did not work.  Has difficulty initiating and maintaining sleep  Has a lot of thoughts when going to bed  Wakes up at night even when she does not have to go to bathroom. However, She wakes up once at night to go to bathroom.  Sleeps about 5 hours total at night  Has been sleepy and feeling tired  Patient states that she dozes off while watching TV    Pertinent PMH (and specialists following):  -rheumatoid arthritis  -back pain; [place] pain [facility], had spine surgeries in the past  -type 2 diabetes ( is the ophthalmology  -cholecystectomy, had ERCP in the past for the common bile duct stone (has history of failures to remove it)  Health Maintenance: Completed  Diabetic foot exam done 11/21/23  Retinal screening due February 2024, but has an upcoming eye appointment a little after that    Past Medical History:   Diagnosis Date    CATARACT 2009    BILATERAL REMOVAL     Diabetes 2007    diet,oral meds    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 reports she has a \"Stone\" in her liver.    Pain     legs,back,pain scale 6    RA (rheumatoid arthritis) (HCC)     Hands    Urinary incontinence 07/14/2020       Current Outpatient Medications   Medication Sig Dispense Refill    atorvastatin (LIPITOR) 40 MG Tab Take 1 Tablet by mouth every evening. 100 Tablet 3    amoxicillin (AMOXIL) 500 MG Cap Take 1 Capsule by mouth 3 times a day for 5 days. 15 Capsule 0    Suvorexant 10 MG Tab Take 1 Tablet by mouth at bedtime as needed (insomnia) for up to 30 days. 30 Tablet 0    diclofenac sodium (VOLTAREN) 1 % Gel APPLY 2 TO 3 GRAMS ON THE SKIN TO ANY AREA YOU HAVE PAIN THREE TIMES TO FOUR TIMES DAILY AS NEEDED      lidocaine (XYLOCAINE) 5 % Ointment APPLY 5 GRAMS OR 6 INCH STRIP WITH SWAB TO PAINFUL AREAS UP TO 4 TIMES DAILY      amLODIPine (NORVASC) 5 MG Tab Take 1 Tablet by mouth every day.      Azelastine HCl 0.15 % Solution       gabapentin (NEURONTIN) 300 MG Cap       HYDROcodone-acetaminophen (NORCO) 5-325 MG Tab per tablet       meloxicam (MOBIC) 15 MG tablet       spironolactone (ALDACTONE) 25 MG Tab       metformin (GLUCOPHAGE) 1000 MG tablet Take 1 Tablet by mouth 2 times a day with meals for 360 days. 200 Tablet 3    losartan (COZAAR) 100 MG Tab Take 1 Tablet by mouth every day for 360 days. 100 Each 3    pioglitazone (ACTOS) 15 MG Tab Take 1 tablet by mouth once daily 100 Tablet 3    hydrochlorothiazide (MICROZIDE) 12.5 MG capsule Take 1 Capsule by mouth every day. 100 Capsule 3    CALCIUM PO Take 1 Tablet by mouth 2 times a day.      Cholecalciferol (D3 PO) Take 1 Capsule by mouth every evening.      Ferrous Sulfate (IRON PO) Take 1 Tablet by mouth every day. OTC      glucose blood strip 1 Strip by Other route every day. 100 Strip 3    dorzolamide-timolol (COSOPT) 22.3-6.8 MG/ML Solution Administer 1 Drop into both eyes 2 times a day.      methotrexate 2.5 MG tablet Take 10 mg by " mouth every 7 days. 4 tablets on Thursday      folic acid (FOLVITE) 1 MG Tab Take 1 mg by mouth every day.      multivitamin (THERAGRAN) TABS Take 1 Tab by mouth every day.      omeprazole (PRILOSEC) 20 MG CPDR Take 20 mg by mouth every morning.       No current facility-administered medications for this visit.       Allergies as of 11/21/2023    (No Known Allergies)       Social History     Socioeconomic History    Marital status:      Spouse name: Not on file    Number of children: Not on file    Years of education: Not on file    Highest education level: Not on file   Occupational History    Not on file   Tobacco Use    Smoking status: Never    Smokeless tobacco: Never   Vaping Use    Vaping Use: Never used   Substance and Sexual Activity    Alcohol use: No     Alcohol/week: 0.0 oz    Drug use: No    Sexual activity: Not on file   Other Topics Concern    Not on file   Social History Narrative    Not on file     Social Determinants of Health     Financial Resource Strain: Not on file   Food Insecurity: Not on file   Transportation Needs: Not on file   Physical Activity: Not on file   Stress: Not on file   Social Connections: Not on file   Intimate Partner Violence: Not on file   Housing Stability: Not on file       Family History   Problem Relation Age of Onset    Cancer Sister         breast       Past Surgical History:   Procedure Laterality Date    [place] ERCP,DIAGNOSTIC N/A 2/2/2023    Procedure: ENDOSCOPIC RETROGRADE CHOLANGIOPANCREATOGRAPHY AND ENDOSCOPIC ULTRASOUND;  Surgeon: Mickey Terrazas M.D.;  Location: [facility];  Service: Gastroenterology    EGD W/ENDOSCOPIC ULTRASOUND N/A 2/2/2023    Procedure: EGD, WITH ENDOSCOPIC US;  Surgeon: Mickey Terrazas M.D.;  Location: [facility];  Service: Gastroenterology    [place] ERCP,DIAGNOSTIC N/A 1/17/2023    Procedure: ENDOSCOPIC RETROGRADE CHOLANGIOPANCREATOGRAPHY;  Surgeon: Milton Walker M.D.;  Location: [facility];  Service:  Gastroenterology    CERVICAL DISK AND FUSION ANTERIOR  7/20/2020    Procedure: DISCECTOMY, SPINE, CERVICAL, ANTERIOR APPROACH, WITH FUSION- C4-6;  Surgeon: Ezra Bailey M.D.;  Location: SURGERY [facility];  Service: Neurosurgery    SHOSHANA BY LAPAROSCOPY  8/21/2012    Performed by CAMMIE HUGHES at SURGERY [facility]    FUSION, SPINE, LUMBAR, PLIF  5/6/2010    Performed by CASSI RESTREPO at SURGERY [facility]    LUMBAR DECOMPRESSION  5/6/2010    Performed by CASSI RESTREPO at SURGERY [facility]    LUMBAR LAMINECTOMY DISKECTOMY  12/26/2009    Performed by CASSI RESTREPO at SURGERY [facility]    CATARACT EXT W/IOL  6/2009    bilateral    VAGINAL HYSTERECTOMY TOTAL  1976    Hysterectomy,Total Vaginal       ROS:   Review of Systems   Constitutional:  Negative for chills, diaphoresis, fever and weight loss.   HENT:  Negative for ear discharge, ear pain, hearing loss and tinnitus.    Eyes:  Negative for blurred vision, double vision, pain and discharge.   Respiratory:  Negative for cough, hemoptysis, sputum production, shortness of breath and wheezing.    Cardiovascular:  Negative for chest pain, palpitations, orthopnea, claudication, leg swelling and PND.   Gastrointestinal:  Negative for abdominal pain, blood in stool, constipation, diarrhea, heartburn, melena, nausea and vomiting.   Genitourinary:  Negative for dysuria, flank pain, hematuria and urgency.   Musculoskeletal:  Positive for back pain. Negative for joint pain, myalgias and neck pain.   Skin:  Negative for itching and rash.   Neurological:  Negative for dizziness, tingling, sensory change, speech change, focal weakness, seizures, loss of consciousness, weakness and headaches.   Endo/Heme/Allergies:  Negative for polydipsia. Does not bruise/bleed easily.   Psychiatric/Behavioral:  Negative for depression, hallucinations, substance abuse and suicidal ideas.        /62 (BP Location: Right arm, Patient Position: Sitting, BP Cuff  "Size: Adult)   Pulse 68   Temp 36.9 °C (98.4 °F) (Temporal)   Resp 14   Ht 1.549 m (5' 1\")   Wt 63.3 kg (139 lb 8 oz)   SpO2 94%   BMI 26.36 kg/m² Body mass index is 26.36 kg/m².    Physical Exam  Vitals reviewed.   Constitutional:       General: She is not in acute distress.     Appearance: She is not ill-appearing or diaphoretic.   HENT:      Head: Normocephalic and atraumatic.      Right Ear: External ear normal.      Left Ear: External ear normal.      Nose: No rhinorrhea.      Mouth/Throat:      Pharynx: No oropharyngeal exudate.      Comments: Mallampati III  Eyes:      General: No scleral icterus.        Right eye: No discharge.         Left eye: No discharge.      Extraocular Movements: Extraocular movements intact.   Cardiovascular:      Rate and Rhythm: Normal rate and regular rhythm.      Pulses:           Dorsalis pedis pulses are 2+ on the right side and 2+ on the left side.        Posterior tibial pulses are 2+ on the right side and 1+ on the left side.      Heart sounds: Normal heart sounds. No murmur heard.  Pulmonary:      Effort: Pulmonary effort is normal. No respiratory distress.      Breath sounds: Normal breath sounds. No stridor. No wheezing, rhonchi or rales.   Abdominal:      General: There is no distension.      Palpations: Abdomen is soft. There is no mass.      Tenderness: There is no abdominal tenderness. There is no guarding or rebound.   Musculoskeletal:         General: No tenderness. Normal range of motion.      Cervical back: No rigidity or tenderness.      Right lower leg: No edema.      Left lower leg: No edema.   Feet:      Right foot:      Protective Sensation: 10 sites tested.  10 sites sensed.      Skin integrity: Skin integrity normal.      Toenail Condition: Right toenails are normal.      Left foot:      Protective Sensation: 10 sites tested.  10 sites sensed.      Skin integrity: Skin integrity normal.      Toenail Condition: Left toenails are normal. "   Lymphadenopathy:      Cervical: No cervical adenopathy.   Skin:     Capillary Refill: Capillary refill takes less than 2 seconds.      Findings: Erythema present. No bruising, lesion or rash.      Comments: Has healing laceration present, but warmth and erythema present around laceration   Neurological:      General: No focal deficit present.      Mental Status: She is alert and oriented to person, place, and time. Mental status is at baseline.      Cranial Nerves: No cranial nerve deficit.      Sensory: No sensory deficit.      Motor: No weakness.      Deep Tendon Reflexes: Reflexes normal.   Psychiatric:         Mood and Affect: Mood normal.         Behavior: Behavior normal.         Judgment: Judgment normal.         I took picture and inserted photo with patient's consent, will use this to continue to monitor progression       Note: I have reviewed all pertinent labs and diagnostic tests associated with this visit with specific comments listed under the assessment and plan below    Assessment and Plan  83 y.o. female with the following -  1. Type 2 diabetes mellitus with other circulatory complication, without long-term current use of insulin (HCC)  Chronic, stable, A1c 6.4 in clinic today  -continue metformin 1000 mg BID, actos 15 mg daily  - POCT A1C  - Comp Metabolic Panel; Future  - Lipid Profile; Future  - Microalbumin Creat Ratio Urine - Lab Collect; Future  - Diabetic Monofilament Lower Extremity Exam    2. Pure hypercholesterolemia  Chronic, stable, patient stopped taking lipitor because she was concerned that there may be side effect,  explained to the patient that in her case, benefits outweigh risks, and patient expressed understanding.  - atorvastatin (LIPITOR) 40 MG Tab; Take 1 Tablet by mouth every evening.  Dispense: 100 Tablet; Refill: 3    3. Cellulitis of left wrist  Acute, decompensated, secondary to laceration. Denies previous antibiotic allergy  - amoxicillin (AMOXIL) 500 MG Cap; Take 1  Capsule by mouth 3 times a day for 5 days.  Dispense: 15 Capsule; Refill: 0    4. Need for vaccination  Given in clinic today as discussed with the patient   - Hepatitis B Vaccine Adult 20+    5. Chronic insomnia  Chronic, decompensated, start Belsomra as discussed with patient; there is concern the patient may have sleep apnea since she wakes up in the middle of night and feels tired also has elevated blood pressure  Stop bang score 3, mallampati III  Explained patient about the risk of taking suvorexant such as sleep walking and hallucination; and expressed understanding and patient will discontinue this if such phenomenon is noted.  - Suvorexant 10 MG Tab; Take 1 Tablet by mouth at bedtime as needed (insomnia) for up to 30 days.  Dispense: 30 Tablet; Refill: 0  - Referral to Pulmonary and Sleep Medicine    6. Other fatigue  Chronic, decompensated, may be related to sleep issues, but ruling out other etiologies.  - Referral to Pulmonary and Sleep Medicine  - VITAMIN D,25 HYDROXY (DEFICIENCY); Future  - TSH WITH REFLEX TO FT4; Future  - VITAMIN B12; Future  - VITAMIN B1; Future     7. Encounter to establish care with new doctor  Established care with me in clinic today    I spent a total of 66 minutes with record review, exam, communication with the patient, communication with other providers, and documentation of this encounter.    Return in about 1 month (around 12/21/2023) for wellness visit and insomnia.    Please note that this dictation was created using voice recognition software. I have made every reasonable attempt to correct obvious errors, but I expect that there are errors of grammar and possibly content that I did not discover before finalizing the note.

## 2023-12-07 ENCOUNTER — TELEPHONE (OUTPATIENT)
Dept: HEALTH INFORMATION MANAGEMENT | Facility: OTHER | Age: 83
End: 2023-12-07
Payer: MEDICARE

## 2023-12-15 ENCOUNTER — OFFICE VISIT (OUTPATIENT)
Dept: SLEEP MEDICINE | Facility: MEDICAL CENTER | Age: 83
End: 2023-12-15
Attending: STUDENT IN AN ORGANIZED HEALTH CARE EDUCATION/TRAINING PROGRAM
Payer: MEDICARE

## 2023-12-15 VITALS
OXYGEN SATURATION: 90 % | HEIGHT: 61 IN | DIASTOLIC BLOOD PRESSURE: 78 MMHG | WEIGHT: 137 LBS | BODY MASS INDEX: 25.86 KG/M2 | RESPIRATION RATE: 16 BRPM | HEART RATE: 72 BPM | SYSTOLIC BLOOD PRESSURE: 122 MMHG

## 2023-12-15 DIAGNOSIS — R53.83 OTHER FATIGUE: ICD-10-CM

## 2023-12-15 DIAGNOSIS — F51.04 CHRONIC INSOMNIA: Primary | ICD-10-CM

## 2023-12-15 DIAGNOSIS — G47.30 SLEEP DISORDER BREATHING: ICD-10-CM

## 2023-12-15 PROCEDURE — 3074F SYST BP LT 130 MM HG: CPT | Performed by: STUDENT IN AN ORGANIZED HEALTH CARE EDUCATION/TRAINING PROGRAM

## 2023-12-15 PROCEDURE — 99204 OFFICE O/P NEW MOD 45 MIN: CPT | Performed by: STUDENT IN AN ORGANIZED HEALTH CARE EDUCATION/TRAINING PROGRAM

## 2023-12-15 PROCEDURE — 99213 OFFICE O/P EST LOW 20 MIN: CPT | Performed by: STUDENT IN AN ORGANIZED HEALTH CARE EDUCATION/TRAINING PROGRAM

## 2023-12-15 PROCEDURE — 3078F DIAST BP <80 MM HG: CPT | Performed by: STUDENT IN AN ORGANIZED HEALTH CARE EDUCATION/TRAINING PROGRAM

## 2023-12-15 ASSESSMENT — FIBROSIS 4 INDEX: FIB4 SCORE: 1.8

## 2023-12-15 NOTE — PROGRESS NOTES
[facility] Consult Note     Date: 12/15/2023 / Time: 9:30 AM      Thank you for requesting a sleep medicine consultation on Jewell Diaz at the sleep center. Presents today with the chief complaints of trouble sleeping. She is referred by MICHAEL Nevarez  [address]  [place] [phone] for evaluation and treatment of insomnia and fatigue.     HISTORY OF PRESENT ILLNESS:     Jewell Diaz is a 83 y.o. female with hypertension, type 2 diabetes mellitus, rheumatoid arthritis, and insomnia.  Presents to Sleep Clinic for evaluation of sleep.     She states the main reason for today's visit is due to the fact that she has difficulty falling asleep and staying asleep.  She states she has had trouble sleeping for decades.  It often takes her over an hour to get to sleep initially.  She will wake up between 2 and 4 times a night and often take hour to get back to sleep.  She was recently prescribed Belsomra 10 mg which she has found to be helpful on some nights.  She has not increased to 20 mg.    She denies any snoring or disruptions to her breathing.  She does have low energy at times during the day and she does have some trouble with irritability at times.    She does not drive.    As per supplemental questionnaire to be scanned or imported into chart:    [place] Sleepiness Score: 8    Sleep Schedule  Bedtime: Weekday & Weekend 10-1030pm   Wake time: Weekday & Weekend 4-6am  Sleep-onset latency: 1 hr or more    Awakenings from sleep: 2-4   Difficulty falling back asleep: 1 hour   Bedroom partner: yes   Naps: Yes, sometimes takes unintentional nap 5-10min,     DAYTIME SYMPTOMS:   Excessive daytime sleepiness: No   Daytime fatigue: Yes, sometimes   Difficulty concentrating: No  Memory problems: No   Irritability:Yes with    Work/school performance issues: No   Sleepiness with driving: No  Not driving   Caffeine/stimulant use: Yes decaf   Alcohol use:No     SLEEP  "RELATED SYMPTOMS  Snoring: No   Witnessed apnea or gasping/choking: No   Dry mouth or mouth breathing: Yes sometimes   Sweating: No   Teeth grinding/biting: No   Morning headaches: No   Refreshed Upon Awakening: No      SLEEP RELATED BEHAVIORS:  Parasomnias (walking, talking, eating, violence): No   Leg kicking: No   Restless legs - \"urge to move\": No   Nightmares: Yes Recurrent: Yes  Dream enactment: No      NARCOLEPSY:  Cataplexy: No   Sleep paralysis: No   Sleep attacks: No   Hypnagogic/hypnopompic hallucinations: No     MEDICAL HISTORY  Past Medical History:   Diagnosis Date    CATARACT 2009    BILATERAL REMOVAL     Diabetes 2007    diet,oral meds    DJD (degenerative joint disease) of thoracic spine     High cholesterol     Hypertension     Liver disorder     Pt. reports she has a \"Stone\" in her liver.    Pain     legs,back,pain scale 6    RA (rheumatoid arthritis) (HCC)     Hands    Urinary incontinence 07/14/2020        SURGICAL HISTORY  Past Surgical History:   Procedure Laterality Date    IN ERCP,DIAGNOSTIC N/A 2/2/2023    Procedure: ENDOSCOPIC RETROGRADE CHOLANGIOPANCREATOGRAPHY AND ENDOSCOPIC ULTRASOUND;  Surgeon: Mickey Terrazas M.D.;  Location: [facility];  Service: Gastroenterology    EGD W/ENDOSCOPIC ULTRASOUND N/A 2/2/2023    Procedure: EGD, WITH ENDOSCOPIC US;  Surgeon: Mickey Terrazas M.D.;  Location: [facility];  Service: Gastroenterology    IN ERCP,DIAGNOSTIC N/A 1/17/2023    Procedure: ENDOSCOPIC RETROGRADE CHOLANGIOPANCREATOGRAPHY;  Surgeon: Milton Walker M.D.;  Location: [facility];  Service: Gastroenterology    CERVICAL DISK AND FUSION ANTERIOR  7/20/2020    Procedure: DISCECTOMY, SPINE, CERVICAL, ANTERIOR APPROACH, WITH FUSION- C4-6;  Surgeon: Ezra Bailey M.D.;  Location: [facility];  Service: Neurosurgery    SHOSHANA BY LAPAROSCOPY  8/21/2012    Performed by CAMMIE HUGHES at [facility]    FUSION, SPINE, LUMBAR, PLIF  5/6/2010 "    Performed by CASSI RESTREPO at SURGERY [facility]    LUMBAR DECOMPRESSION  5/6/2010    Performed by CASSI RESTREPO at SURGERY [facility]    LUMBAR LAMINECTOMY DISKECTOMY  12/26/2009    Performed by CASSI RESTREPO at SURGERY [facility]    CATARACT EXT W/IOL  6/2009    bilateral    VAGINAL HYSTERECTOMY TOTAL  1976    Hysterectomy,Total Vaginal        FAMILY HISTORY  Family History   Problem Relation Age of Onset    Cancer Sister         breast       SOCIAL HISTORY  Social History     Socioeconomic History    Marital status:    Tobacco Use    Smoking status: Never    Smokeless tobacco: Never   Vaping Use    Vaping Use: Never used   Substance and Sexual Activity    Alcohol use: No     Alcohol/week: 0.0 oz    Drug use: No        Occupation: Retired     CURRENT MEDICATIONS  Current Outpatient Medications   Medication Sig Dispense Refill    atorvastatin (LIPITOR) 40 MG Tab Take 1 Tablet by mouth every evening. 100 Tablet 3    Suvorexant 10 MG Tab Take 1 Tablet by mouth at bedtime as needed (insomnia) for up to 30 days. 30 Tablet 0    diclofenac sodium (VOLTAREN) 1 % Gel APPLY 2 TO 3 GRAMS ON THE SKIN TO ANY AREA YOU HAVE PAIN THREE TIMES TO FOUR TIMES DAILY AS NEEDED      lidocaine (XYLOCAINE) 5 % Ointment APPLY 5 GRAMS OR 6 INCH STRIP WITH SWAB TO PAINFUL AREAS UP TO 4 TIMES DAILY      amLODIPine (NORVASC) 5 MG Tab Take 1 Tablet by mouth every day.      Azelastine HCl 0.15 % Solution       gabapentin (NEURONTIN) 300 MG Cap       HYDROcodone-acetaminophen (NORCO) 5-325 MG Tab per tablet       meloxicam (MOBIC) 15 MG tablet       spironolactone (ALDACTONE) 25 MG Tab       metformin (GLUCOPHAGE) 1000 MG tablet Take 1 Tablet by mouth 2 times a day with meals for 360 days. 200 Tablet 3    losartan (COZAAR) 100 MG Tab Take 1 Tablet by mouth every day for 360 days. 100 Each 3    pioglitazone (ACTOS) 15 MG Tab Take 1 tablet by mouth once daily 100 Tablet 3    hydrochlorothiazide (MICROZIDE) 12.5 MG capsule  "Take 1 Capsule by mouth every day. 100 Capsule 3    CALCIUM PO Take 1 Tablet by mouth 2 times a day.      Cholecalciferol (D3 PO) Take 1 Capsule by mouth every evening.      Ferrous Sulfate (IRON PO) Take 1 Tablet by mouth every day. OTC      glucose blood strip 1 Strip by Other route every day. 100 Strip 3    dorzolamide-timolol (COSOPT) 22.3-6.8 MG/ML Solution Administer 1 Drop into both eyes 2 times a day.      methotrexate 2.5 MG tablet Take 10 mg by mouth every 7 days. 4 tablets on Thursday      folic acid (FOLVITE) 1 MG Tab Take 1 mg by mouth every day.      multivitamin (THERAGRAN) TABS Take 1 Tab by mouth every day.      omeprazole (PRILOSEC) 20 MG CPDR Take 20 mg by mouth every morning.       No current facility-administered medications for this visit.       REVIEW OF SYSTEMS  Constitutional: Denies fevers, Denies weight changes  Ears/Nose/Throat/Mouth: Denies nasal congestion or sore throat   Cardiovascular: Denies chest pain  Respiratory: Denies shortness of breath, Denies cough  Gastrointestinal/Hepatic: Denies nausea, vomiting  Sleep: see HPI    Physical Examination:  Vitals/ General Appearance:   Weight/BMI: Body mass index is 25.89 kg/m².  /78 (BP Location: Left arm, Patient Position: Sitting, BP Cuff Size: Adult)   Pulse 72   Resp 16   Ht 1.549 m (5' 1\")   Wt 62.1 kg (137 lb)   SpO2 90%   Vitals:    12/15/23 0928   BP: 122/78   BP Location: Left arm   Patient Position: Sitting   BP Cuff Size: Adult   Pulse: 72   Resp: 16   SpO2: 90%   Weight: 62.1 kg (137 lb)   Height: 1.549 m (5' 1\")       Pt. is alert and oriented to time, place and person. Cooperative and in no apparent distress.     Constitutional: Alert, no distress, well-groomed.  Skin: No rashes in visible areas.  Eye: Round. Conjunctiva clear, lids normal. No icterus.   ENT EXAM  Nasal alae/valves collapsible: No   Nasal septum deviation: Yes  Nasal turbinate hypertrophy: Left: Grade 1   Right: Grade 1  Hard palate narrow: No "   Hard palate high: No   Soft palate/uvula (Mallampati score): 4  Tongue Scalloping: Yes  Retrognathia: No   Micrognathia: No   Cardiovascular:no murmus/gallops/rubs, normal S1 and S2 heart sounds, regular rate and rhythm  Pulmonary:Clear to auscultation, No wheezes, No crackles.  Neurologic:Awake, alert and oriented x 3, walks with walker, No involuntary motions.  Extremities: No clubbing, cyanosis, or edema       ASSESSMENT AND PLAN   Jewell Diaz is a 83 y.o. female with hypertension, type 2 diabetes mellitus, rheumatoid arthritis, and insomnia.  Presents to Sleep Clinic for evaluation of sleep.     1. Jewell Diaz  has symptoms of Obstructive Sleep Apnea (SANG). Jewell Diaz has symptoms of frequent nocturnal awakenings, unrefreshed upon awakening.   Pt has risk factors for SANG include age, and crowded oropharynx.     The pathophysiology of SANG and the increased risk of cardiovascular morbidity from untreated SANG is discussed in detail with the patient. She  also has HTN, DM type II which can be worsened by SANG.      We have discussed diagnostic options including in-laboratory, attended polysomnography and home sleep testing. We have also discussed treatment options including airway pressurization, reconstructive otolaryngologic surgery, dental appliances and weight management.     Subsequently,treatment options will be discussed based on the diagnostic study. Meanwhile, She is urged to avoid supine sleep, weight gain and alcoholic beverages since all of these can worsen SANG. She is cautioned against drowsy driving. If She feels sleepy while driving, advised must pull over for a break/nap, rather than persist on the road, in the interest of Pt's own safety and that of others on the road.    At this time she does not wish to undergo a sleep study    Plan  -Advised that she may benefit from undergoing a sleep study given her nocturnal awakenings and risk for sleep apnea.  This  could also impact some of her comorbidities.  She states at this time she does not wish to undergo sleep study.  -If patient decides to undergo a sleep study 1 can be ordered.  -Advised to reach out via [employer] or by phone with any questions or concerns.     2.Chronic Insomnia   With prolonged sleep latency, frequent awakenings with difficulty falling back asleep and feeling this is impacting daily functioning for decades meets criteria for chronic insomnia. Discussed potential causes of insomnia and importance of having a routine around bedtime. Advised to avoid laying in bed for more then 20-30 min if unable to fall asleep. Dicussed cognitive behavioral therapy for insomnia (CBTi) which is first line treatment for insomnia. Recommended pt participate in self directed CBTi as would likely benefit. Reviewed pharmacologic therapy which is second line treatment and may be considered if circumstances permit.     RECOMMENDATIONS  -May increase Belsomra to 20 mg.  -Reference material given to patient regarding CBTi   -Maintain a regular sleep schedule  -Avoid caffeinated beverages after lunch, and alcohol in late afternoon and evening  -Avoid prolonged use of light-emitting screens before bedtime  -Exercise regularly for at least 20 minutes, preferably more than four to five hours prior to bedtime  -Avoid daytime naps, especially if they are longer than 20 to 30 minutes or occur late in the day  -Advised to contact our office or myself with any questions via [employer]    Return if symptoms worsen or fail to improve.         Regarding treatment of other past medical problems and general health maintenance,  Pt is urged to follow up with PCP.      Please note portions of this record was created using voice recognition software. I have made every reasonable attempt to correct obvious errors, but I expect that there are errors of grammar and possibly content I did not discover before finalizing the note.

## 2023-12-15 NOTE — PATIENT INSTRUCTIONS
"May try 20 mg of Belsomra     It was a pleasure meeting you today. Here are a list of resources for self-guided CTBI.    CBT-I Books  Janny Mind: Turn Off Your Noisy Thoughts and Get a Good Night's Sleep - Gifty Sierra, Phd and Maria Fernanda Huston, Phd  Accessible, enjoyable, and grounded in evidence-based cognitive behavioral therapy (CBT), Janny Mind directly addresses the effects of rumination?or having an overactive brain?on your ability to sleep well. Written by two psychologists who specialize in sleep disorders, the book contains helpful exercises and insights into how you can better manage your thoughts at bedtime, and finally get some sleep.    Insomnia Solved: A Self-Directed Cognitive Behavioral Therapy for Insomnia (CBTI) Program - Mariano Owens MD  Cognitive behavioral therapy for insomnia (CBTI) is often structured as a 6-week treatment program that can help people who have difficulty falling asleep, staying asleep, or find that sleep is unrefreshing. CBTI is scientifically proven, highly effective, and does not rely on medications. CBTI has life-long benefits and most participants report improved sleep satisfaction. Insomnia Solved is based on the core features of this treatment.    Quiet Your Mind and Get to Sleep: Solutions to Insomnia for Those with Depression, Anxiety or Chronic Pain - Maria Fernanda Huston, PhD  This workbook uses cognitive behavior therapy, which has been shown to work as well as sleep medications and produce longer-lasting effects. Research shows that it also works well for those whose insomnia is experienced in the context of anxiety, depression, and chronic pain. The complete program in this book goes to the root of your insomnia and offers the same techniques used by experienced sleep specialists.    \"Say Janny to Insomnia\" by Sheng Posadas     \"No More Sleepless Nights\" by Aníbal Stock    CBT-I Online Resources  Path to Better Sleep (free) - " "https://www.veterantraining.[place].gov/insomnia/index.asp   This free online Cognitive Behavioral Therapy for Insomnia course, which was developed for veterans, takes you through a sleep \"check-in\" and the various components of CBT-I, including modifying unhelpful behaviors and unhelpful thoughts around sleep.    Insomnia Solved - Mariano Owens MD ($79) - http://www.[employer]/fix-my-insomnia/   Insomnia Solved is a self-guided CBTI program created by Mariano Owens M.D., a board-certified medical doctor, that is priced inexpensively at $79. The complete program includes full access to exclusive multimedia content, including the 154-page eBook and online modules and audiofiles.    [place] - Lois Gil, PhD, [facility] ($129) - https://[employer]/   Sleepfitness is an insomnia program based on Cognitive Behavioral Treatment for Insomnia (CBTi) and is a 6-week self-guided online format. IT costs $219 for a 1-year subscription. You can sign up for the 7-day free trial and learn how CBTi can improve your sleep.    Apps  Insomnia  (free)   Offers a self-guided 5-week training plan designed to change sleep habits.  https://LocalGuiding.[place].gov/diana/insomnia-    "

## 2023-12-19 ENCOUNTER — HOSPITAL ENCOUNTER (OUTPATIENT)
Dept: LAB | Facility: MEDICAL CENTER | Age: 83
End: 2023-12-19
Attending: STUDENT IN AN ORGANIZED HEALTH CARE EDUCATION/TRAINING PROGRAM
Payer: MEDICARE

## 2023-12-19 DIAGNOSIS — R53.83 OTHER FATIGUE: ICD-10-CM

## 2023-12-19 DIAGNOSIS — E11.59 TYPE 2 DIABETES MELLITUS WITH OTHER CIRCULATORY COMPLICATION, WITHOUT LONG-TERM CURRENT USE OF INSULIN (HCC): ICD-10-CM

## 2023-12-19 LAB
25(OH)D3 SERPL-MCNC: 45 NG/ML (ref 30–100)
ALBUMIN SERPL BCP-MCNC: 4.2 G/DL (ref 3.2–4.9)
ALBUMIN SERPL BCP-MCNC: 4.3 G/DL (ref 3.2–4.9)
ALBUMIN/GLOB SERPL: 1.4 G/DL
ALP SERPL-CCNC: 90 U/L (ref 30–99)
ALT SERPL-CCNC: 29 U/L (ref 2–50)
ALT SERPL-CCNC: 30 U/L (ref 2–50)
ANION GAP SERPL CALC-SCNC: 10 MMOL/L (ref 7–16)
AST SERPL-CCNC: 25 U/L (ref 12–45)
AST SERPL-CCNC: 26 U/L (ref 12–45)
BASOPHILS # BLD AUTO: 0.6 % (ref 0–1.8)
BASOPHILS # BLD: 0.03 K/UL (ref 0–0.12)
BILIRUB SERPL-MCNC: 0.4 MG/DL (ref 0.1–1.5)
BUN SERPL-MCNC: 27 MG/DL (ref 8–22)
CALCIUM ALBUM COR SERPL-MCNC: 9.5 MG/DL (ref 8.5–10.5)
CALCIUM SERPL-MCNC: 9.7 MG/DL (ref 8.4–10.2)
CHLORIDE SERPL-SCNC: 104 MMOL/L (ref 96–112)
CHOLEST SERPL-MCNC: 220 MG/DL (ref 100–199)
CO2 SERPL-SCNC: 25 MMOL/L (ref 20–33)
CREAT SERPL-MCNC: 0.82 MG/DL (ref 0.5–1.4)
CREAT SERPL-MCNC: 0.83 MG/DL (ref 0.5–1.4)
CREAT UR-MCNC: 81.18 MG/DL
EOSINOPHIL # BLD AUTO: 0.11 K/UL (ref 0–0.51)
EOSINOPHIL NFR BLD: 2 % (ref 0–6.9)
ERYTHROCYTE [DISTWIDTH] IN BLOOD BY AUTOMATED COUNT: 52.4 FL (ref 35.9–50)
ERYTHROCYTE [SEDIMENTATION RATE] IN BLOOD BY WESTERGREN METHOD: 25 MM/HOUR (ref 0–25)
FASTING STATUS PATIENT QL REPORTED: NORMAL
GFR SERPLBLD CREATININE-BSD FMLA CKD-EPI: 71 ML/MIN/1.73 M 2
GFR SERPLBLD CREATININE-BSD FMLA CKD-EPI: 72 ML/MIN/1.73 M 2
GLOBULIN SER CALC-MCNC: 3.1 G/DL (ref 1.9–3.5)
GLUCOSE SERPL-MCNC: 125 MG/DL (ref 65–99)
HCT VFR BLD AUTO: 40.5 % (ref 37–47)
HDLC SERPL-MCNC: 49 MG/DL
HGB BLD-MCNC: 13.2 G/DL (ref 12–16)
IMM GRANULOCYTES # BLD AUTO: 0.02 K/UL (ref 0–0.11)
IMM GRANULOCYTES NFR BLD AUTO: 0.4 % (ref 0–0.9)
LDLC SERPL CALC-MCNC: 141 MG/DL
LYMPHOCYTES # BLD AUTO: 1.27 K/UL (ref 1–4.8)
LYMPHOCYTES NFR BLD: 23.6 % (ref 22–41)
MCH RBC QN AUTO: 34.7 PG (ref 27–33)
MCHC RBC AUTO-ENTMCNC: 32.6 G/DL (ref 32.2–35.5)
MCV RBC AUTO: 106.6 FL (ref 81.4–97.8)
MICROALBUMIN UR-MCNC: 2.5 MG/DL
MICROALBUMIN/CREAT UR: 31 MG/G (ref 0–30)
MONOCYTES # BLD AUTO: 0.51 K/UL (ref 0–0.85)
MONOCYTES NFR BLD AUTO: 9.5 % (ref 0–13.4)
NEUTROPHILS # BLD AUTO: 3.43 K/UL (ref 1.82–7.42)
NEUTROPHILS NFR BLD: 63.9 % (ref 44–72)
NRBC # BLD AUTO: 0 K/UL
NRBC BLD-RTO: 0 /100 WBC (ref 0–0.2)
PLATELET # BLD AUTO: 279 K/UL (ref 164–446)
PMV BLD AUTO: 8.6 FL (ref 9–12.9)
POTASSIUM SERPL-SCNC: 4.8 MMOL/L (ref 3.6–5.5)
PROT SERPL-MCNC: 7.4 G/DL (ref 6–8.2)
RBC # BLD AUTO: 3.8 M/UL (ref 4.2–5.4)
SODIUM SERPL-SCNC: 139 MMOL/L (ref 135–145)
TRIGL SERPL-MCNC: 152 MG/DL (ref 0–149)
TSH SERPL DL<=0.005 MIU/L-ACNC: 1.51 UIU/ML (ref 0.38–5.33)
VIT B12 SERPL-MCNC: 563 PG/ML (ref 211–911)
WBC # BLD AUTO: 5.4 K/UL (ref 4.8–10.8)

## 2023-12-19 PROCEDURE — 82306 VITAMIN D 25 HYDROXY: CPT

## 2023-12-19 PROCEDURE — 82043 UR ALBUMIN QUANTITATIVE: CPT

## 2023-12-19 PROCEDURE — 82570 ASSAY OF URINE CREATININE: CPT

## 2023-12-19 PROCEDURE — 82565 ASSAY OF CREATININE: CPT | Mod: XU

## 2023-12-19 PROCEDURE — 84443 ASSAY THYROID STIM HORMONE: CPT

## 2023-12-19 PROCEDURE — 84450 TRANSFERASE (AST) (SGOT): CPT | Mod: XU

## 2023-12-19 PROCEDURE — 82040 ASSAY OF SERUM ALBUMIN: CPT | Mod: XU

## 2023-12-19 PROCEDURE — 80061 LIPID PANEL: CPT

## 2023-12-19 PROCEDURE — 36415 COLL VENOUS BLD VENIPUNCTURE: CPT

## 2023-12-19 PROCEDURE — 84460 ALANINE AMINO (ALT) (SGPT): CPT | Mod: XU

## 2023-12-19 PROCEDURE — 80053 COMPREHEN METABOLIC PANEL: CPT

## 2023-12-19 PROCEDURE — 84425 ASSAY OF VITAMIN B-1: CPT

## 2023-12-19 PROCEDURE — 85025 COMPLETE CBC W/AUTO DIFF WBC: CPT

## 2023-12-19 PROCEDURE — 85652 RBC SED RATE AUTOMATED: CPT

## 2023-12-19 PROCEDURE — 82607 VITAMIN B-12: CPT

## 2023-12-20 PROBLEM — R80.9 MICROALBUMINURIA DUE TO TYPE 2 DIABETES MELLITUS (HCC): Status: ACTIVE | Noted: 2023-12-20

## 2023-12-20 PROBLEM — E11.29 MICROALBUMINURIA DUE TO TYPE 2 DIABETES MELLITUS (HCC): Status: ACTIVE | Noted: 2023-12-20

## 2023-12-22 LAB — VIT B1 BLD-MCNC: 151 NMOL/L (ref 70–180)

## 2023-12-27 ENCOUNTER — OFFICE VISIT (OUTPATIENT)
Dept: MEDICAL GROUP | Facility: PHYSICIAN GROUP | Age: 83
End: 2023-12-27
Payer: MEDICARE

## 2023-12-27 VITALS
HEART RATE: 72 BPM | DIASTOLIC BLOOD PRESSURE: 72 MMHG | BODY MASS INDEX: 25.53 KG/M2 | TEMPERATURE: 97.5 F | WEIGHT: 135.2 LBS | SYSTOLIC BLOOD PRESSURE: 122 MMHG | RESPIRATION RATE: 14 BRPM | HEIGHT: 61 IN | OXYGEN SATURATION: 94 %

## 2023-12-27 DIAGNOSIS — E11.59 HYPERTENSION ASSOCIATED WITH DIABETES (HCC): ICD-10-CM

## 2023-12-27 DIAGNOSIS — F51.04 CHRONIC INSOMNIA: ICD-10-CM

## 2023-12-27 DIAGNOSIS — E11.69 HYPERLIPIDEMIA ASSOCIATED WITH TYPE 2 DIABETES MELLITUS (HCC): ICD-10-CM

## 2023-12-27 DIAGNOSIS — E78.5 HYPERLIPIDEMIA ASSOCIATED WITH TYPE 2 DIABETES MELLITUS (HCC): ICD-10-CM

## 2023-12-27 DIAGNOSIS — Z23 NEED FOR VACCINATION: ICD-10-CM

## 2023-12-27 DIAGNOSIS — Z91.09 ENVIRONMENTAL ALLERGIES: ICD-10-CM

## 2023-12-27 DIAGNOSIS — R53.83 FATIGUE, UNSPECIFIED TYPE: ICD-10-CM

## 2023-12-27 DIAGNOSIS — E11.59 TYPE 2 DIABETES MELLITUS WITH OTHER CIRCULATORY COMPLICATION, WITHOUT LONG-TERM CURRENT USE OF INSULIN (HCC): ICD-10-CM

## 2023-12-27 DIAGNOSIS — E11.29 MICROALBUMINURIA DUE TO TYPE 2 DIABETES MELLITUS (HCC): ICD-10-CM

## 2023-12-27 DIAGNOSIS — I15.2 HYPERTENSION ASSOCIATED WITH DIABETES (HCC): ICD-10-CM

## 2023-12-27 DIAGNOSIS — E26.1 SECONDARY HYPERALDOSTERONISM (HCC): ICD-10-CM

## 2023-12-27 DIAGNOSIS — R80.9 MICROALBUMINURIA DUE TO TYPE 2 DIABETES MELLITUS (HCC): ICD-10-CM

## 2023-12-27 PROBLEM — L03.114 CELLULITIS OF LEFT WRIST: Status: RESOLVED | Noted: 2023-11-21 | Resolved: 2023-12-27

## 2023-12-27 PROCEDURE — 99215 OFFICE O/P EST HI 40 MIN: CPT | Mod: 25 | Performed by: STUDENT IN AN ORGANIZED HEALTH CARE EDUCATION/TRAINING PROGRAM

## 2023-12-27 PROCEDURE — 90746 HEPB VACCINE 3 DOSE ADULT IM: CPT | Performed by: STUDENT IN AN ORGANIZED HEALTH CARE EDUCATION/TRAINING PROGRAM

## 2023-12-27 PROCEDURE — 3074F SYST BP LT 130 MM HG: CPT | Performed by: STUDENT IN AN ORGANIZED HEALTH CARE EDUCATION/TRAINING PROGRAM

## 2023-12-27 PROCEDURE — G0010 ADMIN HEPATITIS B VACCINE: HCPCS | Performed by: STUDENT IN AN ORGANIZED HEALTH CARE EDUCATION/TRAINING PROGRAM

## 2023-12-27 PROCEDURE — 3078F DIAST BP <80 MM HG: CPT | Performed by: STUDENT IN AN ORGANIZED HEALTH CARE EDUCATION/TRAINING PROGRAM

## 2023-12-27 RX ORDER — ATORVASTATIN CALCIUM 40 MG/1
40 TABLET, FILM COATED ORAL EVERY EVENING
Qty: 90 TABLET | Refills: 1 | COMMUNITY
Start: 2023-12-27 | End: 2023-12-27 | Stop reason: SDUPTHER

## 2023-12-27 RX ORDER — ATORVASTATIN CALCIUM 40 MG/1
40 TABLET, FILM COATED ORAL EVERY EVENING
Qty: 100 TABLET | Refills: 2 | Status: SHIPPED | OUTPATIENT
Start: 2023-12-27

## 2023-12-27 ASSESSMENT — ENCOUNTER SYMPTOMS
SPUTUM PRODUCTION: 0
NAUSEA: 0
VOMITING: 0
EYE PAIN: 0
DOUBLE VISION: 0
PND: 0
DIARRHEA: 0
WHEEZING: 0
HEADACHES: 0
SHORTNESS OF BREATH: 0
CONSTIPATION: 0
MYALGIAS: 0
COUGH: 0
SENSORY CHANGE: 0
CLAUDICATION: 0
SEIZURES: 0
ORTHOPNEA: 0
HALLUCINATIONS: 0
DEPRESSION: 0
HEMOPTYSIS: 0
FOCAL WEAKNESS: 0
SPEECH CHANGE: 0
HEARTBURN: 0
TINGLING: 0
PALPITATIONS: 0
ABDOMINAL PAIN: 0
BLOOD IN STOOL: 0
BACK PAIN: 1
POLYDIPSIA: 0
NECK PAIN: 0
DIAPHORESIS: 0
CHILLS: 0
BLURRED VISION: 0
FEVER: 0
EYE DISCHARGE: 0
WEIGHT LOSS: 0
FLANK PAIN: 0
DIZZINESS: 0
BRUISES/BLEEDS EASILY: 0
WEAKNESS: 0
LOSS OF CONSCIOUSNESS: 0

## 2023-12-27 ASSESSMENT — FIBROSIS 4 INDEX: FIB4 SCORE: 1.38

## 2023-12-27 ASSESSMENT — LIFESTYLE VARIABLES: SUBSTANCE_ABUSE: 0

## 2023-12-27 NOTE — PROGRESS NOTES
CC:  Diagnoses of Chronic insomnia, Fatigue, unspecified type, Secondary hyperaldosteronism (HCC), Hyperlipidemia associated with type 2 diabetes mellitus (HCC), Microalbuminuria due to type 2 diabetes mellitus (HCC), Hypertension associated with diabetes (HCC), Need for vaccination, Type 2 diabetes mellitus with other circulatory complication, without long-term current use of insulin (HCC), and Environmental allergies were pertinent to this visit.    HISTORY OF THE PRESENT ILLNESS: Patient is a 83 y.o. female for follow up visit.  When she took belsomra and lipitor together, she urinated so much so stopped lipitor, which seems to be why she had elevated cholestrol. Belsomra helped initially and helped intermittently. She is no longer taking belsomra but takes lipitor. She does not want to take belsomra anymore.    She declines sleep study. She saw sleep medicine but declined sleep study and no longer seeing him.    Problem   Environmental Allergies    Has had allergy for years, itchy and watery eyes, sneezing and cough. Zyrtec did not help. Has not seen allergist. Allergy sometimes gets worse.     Hypertension Associated With Diabetes (Hcc)    Chronic condition.  Current medication regimen: amlodipine 5mg daily, HCTZ 12.5mg daily, losartan 100mg daily, spironolactone 25mg daily  Patient tolerating and reports compliant with medications. She does not regularly monitor blood pressure at home. Does not need refill of medications today. Denies headache, dizziness, vision changes, chest pain, dyspnea, edema.     Chronic Insomnia    Chronic condition. She sleeps 4-5 hours a night and takes a nap during the day, falls asleep watching tv.  When she took belsomra and lipitor together, she urinated so much so stopped lipitor, which seems to be why she had elevated cholestrol. Belsomra helped initially and helped intermittently. She is no longer taking belsomra but takes lipitor. She does not want to take belsomra  "anymore.  She declines sleep study. She saw sleep medicine but declined sleep study and no longer seeing him.  Has had chronic insomnia forever according to patient  Tried different sleeping pills: Trazodone 50 mg and zolpidem 5 mg did not work.  Has difficulty initiating and maintaining sleep  Has a lot of thoughts when going to bed  Wakes up at night even when she does not have to go to bathroom. However, She wakes up once at night to go to bathroom.  Sleeps about 5 hours total at night  Has been sleepy and feeling tired  Patient states that she dozes off while watching TV     Cellulitis of Left Wrist (Resolved)     Pertinent PMH (and specialists following):  -rheumatoid arthritis: Rheumatology,   -back pain; [place] pain specialist, had spine surgeries in the past  -type 2 diabetes ( is the ophthalmology  -cholecystectomy, had ERCP in the past for the common bile duct stone (has history of failures to remove it)  Health Maintenance: Completed  Diabetic foot exam done 11/21/23  Retinal screening due February 2024, but has an upcoming eye appointment a little after that    Past Medical History:   Diagnosis Date    CATARACT 2009    BILATERAL REMOVAL     Cellulitis of left wrist 11/21/2023    Diabetes 2007    diet,oral meds    DJD (degenerative joint disease) of thoracic spine     High cholesterol     Hypertension     Liver disorder     Pt. reports she has a \"Stone\" in her liver.    Pain     legs,back,pain scale 6    RA (rheumatoid arthritis) (HCC)     Hands    Urinary incontinence 07/14/2020       Current Outpatient Medications   Medication Sig Dispense Refill    atorvastatin (LIPITOR) 40 MG Tab Take 1 Tablet by mouth every evening. 100 Tablet 2    diclofenac sodium (VOLTAREN) 1 % Gel APPLY 2 TO 3 GRAMS ON THE SKIN TO ANY AREA YOU HAVE PAIN THREE TIMES TO FOUR TIMES DAILY AS NEEDED      lidocaine (XYLOCAINE) 5 % Ointment APPLY 5 GRAMS OR 6 INCH STRIP WITH SWAB TO PAINFUL AREAS UP TO 4 TIMES DAILY      " amLODIPine (NORVASC) 5 MG Tab Take 1 Tablet by mouth every day.      gabapentin (NEURONTIN) 300 MG Cap       HYDROcodone-acetaminophen (NORCO) 5-325 MG Tab per tablet       meloxicam (MOBIC) 15 MG tablet       spironolactone (ALDACTONE) 25 MG Tab       metformin (GLUCOPHAGE) 1000 MG tablet Take 1 Tablet by mouth 2 times a day with meals for 360 days. 200 Tablet 3    losartan (COZAAR) 100 MG Tab Take 1 Tablet by mouth every day for 360 days. 100 Each 3    pioglitazone (ACTOS) 15 MG Tab Take 1 tablet by mouth once daily 100 Tablet 3    hydrochlorothiazide (MICROZIDE) 12.5 MG capsule Take 1 Capsule by mouth every day. 100 Capsule 3    CALCIUM PO Take 1 Tablet by mouth 2 times a day.      Cholecalciferol (D3 PO) Take 1 Capsule by mouth every evening.      Ferrous Sulfate (IRON PO) Take 1 Tablet by mouth every day. OTC      glucose blood strip 1 Strip by Other route every day. 100 Strip 3    dorzolamide-timolol (COSOPT) 22.3-6.8 MG/ML Solution Administer 1 Drop into both eyes 2 times a day.      methotrexate 2.5 MG tablet Take 10 mg by mouth every 7 days. 4 tablets on Thursday      folic acid (FOLVITE) 1 MG Tab Take 1 mg by mouth every day.      multivitamin (THERAGRAN) TABS Take 1 Tab by mouth every day.      omeprazole (PRILOSEC) 20 MG CPDR Take 20 mg by mouth every morning.       No current facility-administered medications for this visit.       Allergies as of 12/27/2023    (No Known Allergies)       Social History     Socioeconomic History    Marital status:      Spouse name: Not on file    Number of children: Not on file    Years of education: Not on file    Highest education level: Not on file   Occupational History    Not on file   Tobacco Use    Smoking status: Never    Smokeless tobacco: Never   Vaping Use    Vaping Use: Never used   Substance and Sexual Activity    Alcohol use: No     Alcohol/week: 0.0 oz    Drug use: No    Sexual activity: Not on file   Other Topics Concern    Not on file   Social  History Narrative    Not on file     Social Determinants of Health     Financial Resource Strain: Not on file   Food Insecurity: Not on file   Transportation Needs: Not on file   Physical Activity: Not on file   Stress: Not on file   Social Connections: Not on file   Intimate Partner Violence: Not on file   Housing Stability: Not on file       Family History   Problem Relation Age of Onset    Cancer Sister         breast       Past Surgical History:   Procedure Laterality Date    RI ERCP,DIAGNOSTIC N/A 2/2/2023    Procedure: ENDOSCOPIC RETROGRADE CHOLANGIOPANCREATOGRAPHY AND ENDOSCOPIC ULTRASOUND;  Surgeon: Mickey Terrazas M.D.;  Location: [facility];  Service: Gastroenterology    EGD W/ENDOSCOPIC ULTRASOUND N/A 2/2/2023    Procedure: EGD, WITH ENDOSCOPIC US;  Surgeon: Mickey Terrazas M.D.;  Location: SURGERY [facility];  Service: Gastroenterology    RI ERCP,DIAGNOSTIC N/A 1/17/2023    Procedure: ENDOSCOPIC RETROGRADE CHOLANGIOPANCREATOGRAPHY;  Surgeon: Milton Walker M.D.;  Location: [facility];  Service: Gastroenterology    CERVICAL DISK AND FUSION ANTERIOR  7/20/2020    Procedure: DISCECTOMY, SPINE, CERVICAL, ANTERIOR APPROACH, WITH FUSION- C4-6;  Surgeon: Ezra Bailey M.D.;  Location: [facility];  Service: Neurosurgery    SHOSHANA BY LAPAROSCOPY  8/21/2012    Performed by CAMMIE HUGHES at [facility] ORS    FUSION, SPINE, LUMBAR, PLIF  5/6/2010    Performed by CASSI RESTREPO at [facility] ORS    LUMBAR DECOMPRESSION  5/6/2010    Performed by CASSI RESTREPO at [facility] ORS    LUMBAR LAMINECTOMY DISKECTOMY  12/26/2009    Performed by CASSI RESTREPO at [facility] ORS    CATARACT EXT W/IOL  6/2009    bilateral    VAGINAL HYSTERECTOMY TOTAL  1976    Hysterectomy,Total Vaginal       ROS:   Review of Systems   Constitutional:  Negative for chills, diaphoresis, fever and weight loss.   HENT:  Negative for ear discharge, ear pain, hearing loss  "and tinnitus.    Eyes:  Negative for blurred vision, double vision, pain and discharge.   Respiratory:  Negative for cough, hemoptysis, sputum production, shortness of breath and wheezing.    Cardiovascular:  Negative for chest pain, palpitations, orthopnea, claudication, leg swelling and PND.   Gastrointestinal:  Negative for abdominal pain, blood in stool, constipation, diarrhea, heartburn, melena, nausea and vomiting.   Genitourinary:  Negative for dysuria, flank pain, hematuria and urgency.   Musculoskeletal:  Positive for back pain. Negative for joint pain, myalgias and neck pain.   Skin:  Negative for itching and rash.   Neurological:  Negative for dizziness, tingling, sensory change, speech change, focal weakness, seizures, loss of consciousness, weakness and headaches.   Endo/Heme/Allergies:  Negative for polydipsia. Does not bruise/bleed easily.   Psychiatric/Behavioral:  Negative for depression, hallucinations, substance abuse and suicidal ideas.        /72 (BP Location: Left arm, Patient Position: Sitting)   Pulse 72   Temp 36.4 °C (97.5 °F) (Temporal)   Resp 14   Ht 1.549 m (5' 1\")   Wt 61.3 kg (135 lb 3.2 oz)   SpO2 94%   BMI 25.55 kg/m² Body mass index is 25.55 kg/m².    Physical Exam  Vitals reviewed.   Constitutional:       General: She is not in acute distress.     Appearance: She is not ill-appearing or diaphoretic.   HENT:      Head: Normocephalic and atraumatic.      Right Ear: External ear normal.      Left Ear: External ear normal.      Nose: Rhinorrhea present.      Mouth/Throat:      Pharynx: No oropharyngeal exudate.      Comments: Mallampati III  Eyes:      General: No scleral icterus.        Right eye: No discharge.         Left eye: No discharge.      Extraocular Movements: Extraocular movements intact.   Cardiovascular:      Rate and Rhythm: Normal rate and regular rhythm.      Heart sounds: Normal heart sounds. No murmur heard.  Pulmonary:      Effort: Pulmonary effort is " normal. No respiratory distress.      Breath sounds: Normal breath sounds. No stridor. No wheezing, rhonchi or rales.   Abdominal:      General: There is no distension.      Palpations: Abdomen is soft. There is no mass.      Tenderness: There is no abdominal tenderness. There is no guarding or rebound.   Musculoskeletal:         General: No tenderness. Normal range of motion.      Cervical back: No rigidity or tenderness.      Right lower leg: No edema.      Left lower leg: No edema.   Lymphadenopathy:      Cervical: No cervical adenopathy.   Skin:     Capillary Refill: Capillary refill takes less than 2 seconds.      Findings: No bruising, erythema, lesion or rash.      Comments: Hand Laceration well healed   Neurological:      General: No focal deficit present.      Mental Status: She is alert and oriented to person, place, and time. Mental status is at baseline.      Cranial Nerves: No cranial nerve deficit.      Sensory: No sensory deficit.      Motor: No weakness.      Deep Tendon Reflexes: Reflexes normal.   Psychiatric:         Mood and Affect: Mood normal.         Behavior: Behavior normal.         Thought Content: Thought content normal.         Judgment: Judgment normal.         Assessment and Plan  83 y.o. female with the following -    HCC Gap Form    Diagnosis to address: E26.1 - Secondary hyperaldosteronism (HCC)  Assessment and plan: Chronic, stable. Continue with current defined treatment plan: continue spironolactone 25 mg daily . Follow-up at least annually.  Last edited 12/27/23 16:02 PST by Renny Desouza M.D.        1. Chronic insomnia  Chronic, stable, patient states that her insomnia improved Belsomra helped but she said that her insomnia improved so she does not need it anymore    2. Fatigue, unspecified type  Chronic, stable, likely due to sleep apnea or insomnia; vitamin D, TSH, vitamin B12, vitamin B1 were normal  Patient declined sleep study; she saw sleep medicine once; continues to  decline sleep study; explained the risk of sleep apnea including pulm hypertension and heart failure and hypertension, patient expressed understanding but declined    3. Secondary hyperaldosteronism (HCC)  As mentioned above in HCC gap form     4. Hyperlipidemia associated with type 2 diabetes mellitus (HCC)  Chronic, decompensated,  on recent lab; patient was not taking Lipitor 40 mg nightly when she had  blood drawn but she states that she started taking it again  -Continue Lipitor 40 mg nightly  - Lipid Profile; Future    5. 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 Hypertension associated with diabetes (HCC)  Chronic, stable, blood pressure in clinic is 122/72  -Continue losartan 100 mg daily    7. Need for vaccination  Given in clinic today as discussed with the patient   - Hep B Adult 20+    8. Type 2 diabetes mellitus with other circulatory complication, without long-term current use of insulin (HCC)  Chronic, stable, last A1c was 6.4 in November 2023  -Continue metformin 1000 mg twice daily, Actos 15 mg daily  - HEMOGLOBIN A1C; Future  - Basic Metabolic Panel; Future    9. Environmental allergies  Chronic, decompensated,   Has not ever allergy testing before  Zyrtec did not help much  -Take claritin (loratadine) 10 mg one day and next day allegra (fexofenadine) 180 mg one day; alternate between them.    I spent a total of 50 minutes with record review, exam, communication with the patient, communication with other providers, and documentation of this encounter.    Return in about 6 months (around 6/27/2024) for wellness visit.    Please note that this dictation was created using voice recognition software. I have made every reasonable attempt to correct obvious errors, but I expect that there are errors of grammar and possibly content that I did not  discover before finalizing the note.    no...

## 2023-12-27 NOTE — PATIENT INSTRUCTIONS
-Do labs (fasting) 1-2 weeks before next visit.   -Take claritin (loratadine) one day and next day allegra (fexofenadine);alternate between them.

## 2024-01-08 ENCOUNTER — APPOINTMENT (OUTPATIENT)
Dept: RADIOLOGY | Facility: MEDICAL CENTER | Age: 84
End: 2024-01-08
Payer: MEDICARE

## 2024-01-08 DIAGNOSIS — M54.16 LUMBAR RADICULOPATHY: ICD-10-CM

## 2024-01-08 PROCEDURE — 72110 X-RAY EXAM L-2 SPINE 4/>VWS: CPT

## 2024-01-18 ENCOUNTER — HOSPITAL ENCOUNTER (OUTPATIENT)
Dept: RADIOLOGY | Facility: MEDICAL CENTER | Age: 84
End: 2024-01-18
Payer: MEDICARE

## 2024-01-18 DIAGNOSIS — M54.16 LUMBAR RADICULOPATHY: ICD-10-CM

## 2024-01-18 PROCEDURE — A9579 GAD-BASE MR CONTRAST NOS,1ML: HCPCS

## 2024-01-18 PROCEDURE — 72158 MRI LUMBAR SPINE W/O & W/DYE: CPT

## 2024-01-18 PROCEDURE — 72110 X-RAY EXAM L-2 SPINE 4/>VWS: CPT

## 2024-01-18 PROCEDURE — 700117 HCHG RX CONTRAST REV CODE 255

## 2024-01-18 RX ADMIN — GADOTERIDOL 13 ML: 279.3 INJECTION, SOLUTION INTRAVENOUS at 15:34

## 2024-03-02 RX ORDER — AMLODIPINE BESYLATE 5 MG/1
5 TABLET ORAL DAILY
Qty: 100 TABLET | Refills: 2 | Status: SHIPPED | OUTPATIENT
Start: 2024-03-02

## 2024-03-18 ENCOUNTER — HOSPITAL ENCOUNTER (OUTPATIENT)
Dept: LAB | Facility: MEDICAL CENTER | Age: 84
End: 2024-03-18
Attending: STUDENT IN AN ORGANIZED HEALTH CARE EDUCATION/TRAINING PROGRAM
Payer: MEDICARE

## 2024-03-18 LAB
ANION GAP SERPL CALC-SCNC: 11 MMOL/L (ref 7–16)
BUN SERPL-MCNC: 33 MG/DL (ref 8–22)
CALCIUM SERPL-MCNC: 9.7 MG/DL (ref 8.4–10.2)
CHLORIDE SERPL-SCNC: 105 MMOL/L (ref 96–112)
CHOLEST SERPL-MCNC: 155 MG/DL (ref 100–199)
CO2 SERPL-SCNC: 24 MMOL/L (ref 20–33)
CREAT SERPL-MCNC: 0.75 MG/DL (ref 0.5–1.4)
EST. AVERAGE GLUCOSE BLD GHB EST-MCNC: 146 MG/DL
FASTING STATUS PATIENT QL REPORTED: NORMAL
GFR SERPLBLD CREATININE-BSD FMLA CKD-EPI: 80 ML/MIN/1.73 M 2
GLUCOSE SERPL-MCNC: 138 MG/DL (ref 65–99)
HBA1C MFR BLD: 6.7 % (ref 4–5.6)
HDLC SERPL-MCNC: 66 MG/DL
LDLC SERPL CALC-MCNC: 78 MG/DL
POTASSIUM SERPL-SCNC: 4.4 MMOL/L (ref 3.6–5.5)
SODIUM SERPL-SCNC: 140 MMOL/L (ref 135–145)
TRIGL SERPL-MCNC: 53 MG/DL (ref 0–149)

## 2024-03-18 PROCEDURE — 80061 LIPID PANEL: CPT

## 2024-03-18 PROCEDURE — 80048 BASIC METABOLIC PNL TOTAL CA: CPT

## 2024-03-18 PROCEDURE — 36415 COLL VENOUS BLD VENIPUNCTURE: CPT

## 2024-03-18 PROCEDURE — 83036 HEMOGLOBIN GLYCOSYLATED A1C: CPT

## 2024-03-21 ENCOUNTER — OFFICE VISIT (OUTPATIENT)
Dept: MEDICAL GROUP | Facility: PHYSICIAN GROUP | Age: 84
End: 2024-03-21
Payer: MEDICARE

## 2024-03-21 VITALS
TEMPERATURE: 98.3 F | DIASTOLIC BLOOD PRESSURE: 60 MMHG | HEART RATE: 90 BPM | WEIGHT: 139 LBS | SYSTOLIC BLOOD PRESSURE: 110 MMHG | BODY MASS INDEX: 26.24 KG/M2 | OXYGEN SATURATION: 92 % | HEIGHT: 61 IN

## 2024-03-21 DIAGNOSIS — E11.59 TYPE 2 DIABETES MELLITUS WITH OTHER CIRCULATORY COMPLICATION, WITHOUT LONG-TERM CURRENT USE OF INSULIN (HCC): ICD-10-CM

## 2024-03-21 DIAGNOSIS — M06.9 RHEUMATOID ARTHRITIS INVOLVING MULTIPLE JOINTS (HCC): ICD-10-CM

## 2024-03-21 DIAGNOSIS — Z00.00 ENCOUNTER FOR MEDICARE ANNUAL WELLNESS EXAM: ICD-10-CM

## 2024-03-21 DIAGNOSIS — M54.59 OTHER LOW BACK PAIN: ICD-10-CM

## 2024-03-21 DIAGNOSIS — R16.0 HYPODENSE MASS OF LIVER: ICD-10-CM

## 2024-03-21 DIAGNOSIS — E11.69 HYPERLIPIDEMIA ASSOCIATED WITH TYPE 2 DIABETES MELLITUS (HCC): ICD-10-CM

## 2024-03-21 DIAGNOSIS — E78.5 HYPERLIPIDEMIA ASSOCIATED WITH TYPE 2 DIABETES MELLITUS (HCC): ICD-10-CM

## 2024-03-21 DIAGNOSIS — E11.59 HYPERTENSION ASSOCIATED WITH DIABETES (HCC): ICD-10-CM

## 2024-03-21 DIAGNOSIS — I70.0 ATHEROSCLEROSIS OF AORTA (HCC): ICD-10-CM

## 2024-03-21 DIAGNOSIS — E11.29 MICROALBUMINURIA DUE TO TYPE 2 DIABETES MELLITUS (HCC): ICD-10-CM

## 2024-03-21 DIAGNOSIS — D69.2 SENILE PURPURA (HCC): ICD-10-CM

## 2024-03-21 DIAGNOSIS — R80.9 MICROALBUMINURIA DUE TO TYPE 2 DIABETES MELLITUS (HCC): ICD-10-CM

## 2024-03-21 DIAGNOSIS — I15.2 HYPERTENSION ASSOCIATED WITH DIABETES (HCC): ICD-10-CM

## 2024-03-21 DIAGNOSIS — Z72.820 LACK OF ADEQUATE SLEEP: ICD-10-CM

## 2024-03-21 PROBLEM — E26.1 SECONDARY HYPERALDOSTERONISM (HCC): Status: RESOLVED | Noted: 2023-11-21 | Resolved: 2024-03-21

## 2024-03-21 PROBLEM — R21 ACUTE MACULOPAPULAR RASH: Status: RESOLVED | Noted: 2023-01-10 | Resolved: 2024-03-21

## 2024-03-21 PROCEDURE — 3078F DIAST BP <80 MM HG: CPT | Performed by: STUDENT IN AN ORGANIZED HEALTH CARE EDUCATION/TRAINING PROGRAM

## 2024-03-21 PROCEDURE — G0439 PPPS, SUBSEQ VISIT: HCPCS | Performed by: STUDENT IN AN ORGANIZED HEALTH CARE EDUCATION/TRAINING PROGRAM

## 2024-03-21 PROCEDURE — 3074F SYST BP LT 130 MM HG: CPT | Performed by: STUDENT IN AN ORGANIZED HEALTH CARE EDUCATION/TRAINING PROGRAM

## 2024-03-21 ASSESSMENT — ENCOUNTER SYMPTOMS: GENERAL WELL-BEING: FAIR

## 2024-03-21 ASSESSMENT — FIBROSIS 4 INDEX: FIB4 SCORE: 1.38

## 2024-03-21 ASSESSMENT — PATIENT HEALTH QUESTIONNAIRE - PHQ9: CLINICAL INTERPRETATION OF PHQ2 SCORE: 0

## 2024-03-21 ASSESSMENT — ACTIVITIES OF DAILY LIVING (ADL): BATHING_REQUIRES_ASSISTANCE: 0

## 2024-03-21 NOTE — PROGRESS NOTES
Chief Complaint   Patient presents with    Annual Exam       HPI:  Jewell Diza is a 83 y.o. here for Medicare Annual Wellness Visit.    Pertinent PMH (and specialists following):  -rheumatoid arthritis: Rheumatology,   -back pain; [place] [facility] [place], had spine surgeries in the past  -type 2 diabetes ( is the ophthalmology  -cholecystectomy, had ERCP in the past for the common bile duct stone (has history of failures to remove it)  Health Maintenance: Completed  Diabetic foot exam done 11/21/23  Retinal screening due February 2024, but has an upcoming eye appointment a little after that     Patient Active Problem List    Diagnosis Date Noted    Lack of adequate sleep 03/21/2024    Senile purpura (HCC) 03/21/2024    Other low back pain 03/21/2024    Environmental allergies 12/27/2023    Microalbuminuria due to type 2 diabetes mellitus (HCC) 12/20/2023    LUQ pain 07/31/2023    Macrocytosis without anemia 07/11/2023    Uses roller walker 07/11/2023    Cellulitis of face 05/25/2023    Chronic cough 05/25/2023    Hypertension associated with diabetes (HCC) 02/13/2023    Hyperlipidemia associated with type 2 diabetes mellitus (HCC) 02/13/2023    Chronic insomnia 01/24/2023    Choledocholithiasis 01/17/2023    Post-ERCP acute pancreatitis 01/17/2023    Hypodense mass of liver 07/12/2022    Nonspecific abnormal function study, cardiovascular 05/17/2022    Allergic rhinitis 01/11/2022    Atherosclerosis of aorta (HCC) 06/10/2021    PND (post-nasal drip) 01/08/2021    Rheumatoid arthritis involving multiple joints (HCC) 02/24/2014    Biliary calculus 08/20/2012    Lumbar radiculopathy 05/11/2010    Type 2 diabetes mellitus with circulatory disorder, without long-term current use of insulin (HCC) 05/11/2010       Current Outpatient Medications   Medication Sig Dispense Refill    amLODIPine (NORVASC) 5 MG Tab Take 1 Tablet by mouth every day. 100 Tablet 2    atorvastatin (LIPITOR) 40 MG Tab  Take 1 Tablet by mouth every evening. 100 Tablet 2    diclofenac sodium (VOLTAREN) 1 % Gel APPLY 2 TO 3 GRAMS ON THE SKIN TO ANY AREA YOU HAVE PAIN THREE TIMES TO FOUR TIMES DAILY AS NEEDED      lidocaine (XYLOCAINE) 5 % Ointment APPLY 5 GRAMS OR 6 INCH STRIP WITH SWAB TO PAINFUL AREAS UP TO 4 TIMES DAILY      metformin (GLUCOPHAGE) 1000 MG tablet Take 1 Tablet by mouth 2 times a day with meals for 360 days. 200 Tablet 3    losartan (COZAAR) 100 MG Tab Take 1 Tablet by mouth every day for 360 days. 100 Each 3    pioglitazone (ACTOS) 15 MG Tab Take 1 tablet by mouth once daily 100 Tablet 3    hydrochlorothiazide (MICROZIDE) 12.5 MG capsule Take 1 Capsule by mouth every day. 100 Capsule 3    CALCIUM PO Take 1 Tablet by mouth 2 times a day.      Cholecalciferol (D3 PO) Take 1 Capsule by mouth every evening.      Ferrous Sulfate (IRON PO) Take 1 Tablet by mouth every day. OTC      glucose blood strip 1 Strip by Other route every day. 100 Strip 3    dorzolamide-timolol (COSOPT) 22.3-6.8 MG/ML Solution Administer 1 Drop into both eyes 2 times a day.      methotrexate 2.5 MG tablet Take 10 mg by mouth every 7 days. 4 tablets on Thursday      folic acid (FOLVITE) 1 MG Tab Take 1 mg by mouth every day.      multivitamin (THERAGRAN) TABS Take 1 Tab by mouth every day.      omeprazole (PRILOSEC) 20 MG CPDR Take 20 mg by mouth every morning.       No current facility-administered medications for this visit.          Current supplements as per medication list.     Allergies: Patient has no known allergies.    Current social contact/activities: cooking     She  reports that she has never smoked. She has never used smokeless tobacco. She reports that she does not drink alcohol and does not use drugs.  Counseling given: Not Answered      ROS:    Gait: Uses a walker  Ostomy: No  Other tubes: No  Amputations: No  Chronic oxygen use: No  Last eye exam: Has appointment scheduled  Wears hearing aids: No   : Denies any urinary leakage  during the last 6 months      Depression Screening  Little interest or pleasure in doing things?  0 - not at all  Feeling down, depressed , or hopeless? 0 - not at all  Patient Health Questionnaire Score: 0     If depressive symptoms identified deferred to follow up visit unless specifically addressed in assessment and plan.    Interpretation of PHQ-9 Total Score   Score Severity   1-4 No Depression   5-9 Mild Depression   10-14 Moderate Depression   15-19 Moderately Severe Depression   20-27 Severe Depression    Screening for Cognitive Impairment  Do you or any of your friends or family members have any concern about your memory? No  Three Minute Recall (Leader, Season, Table) 3/3    Matt clock face with all 12 numbers and set the hands to show 10 minutes after 11.  Yes    Cognitive concerns identified deferred for follow up unless specifically addressed in assessment and plan.    Fall Risk Assessment  Has the patient had two or more falls in the last year or any fall with injury in the last year?  No    Safety Assessment  Do you always wear your seatbelt?  Yes  Any changes to home needed to function safely? No  Difficulty hearing.  No  Patient counseled about all safety risks that were identified.    Functional Assessment ADLs  Are there any barriers preventing you from cooking for yourself or meeting nutritional needs?  No.    Are there any barriers preventing you from driving safely or obtaining transportation?  No.    Are there any barriers preventing you from using a telephone or calling for help?  No    Are there any barriers preventing you from shopping?  No.    Are there any barriers preventing you from taking care of your own finances?  No    Are there any barriers preventing you from managing your medications?  No    Are there any barriers preventing you from showering, bathing or dressing yourself? No    Are there any barriers preventing you from doing housework or laundry? No  Are there any barriers  preventing you from using the toilet?No  Are you currently engaging in any exercise or physical activity?  Yes.      Self-Assessment of Health  What is your perception of your health? Fair  Do you sleep more than six hours a night? Yes  In the past 7 days, how much did pain keep you from doing your normal work? Some  Do you spend quality time with family or friends (virtually or in person)? Yes  Do you usually eat a heart healthy diet that constists of a variety of fruits, vegetables, whole grains and fiber? Yes  Do you eat foods high in fat and/or Fast Food more than three times per week? No    Advance Care Planning  Do you have an Advance Directive, Living Will, Durable Power of , or POLST? Yes  Advance Directive       is not on file - instructed patient to bring in a copy to scan into their chart      Health Maintenance Summary            Overdue - COVID-19 Vaccine (4 - 2023-24 season) Overdue since 9/1/2023 11/16/2021  Imm Admin: MODERNA SARS-COV-2 VACCINE (12+)    03/11/2021  Imm Admin: MODERNA SARS-COV-2 VACCINE (12+)    02/11/2021  Imm Admin: MODERNA SARS-COV-2 VACCINE (12+)              Overdue - Diabetes: Retinopathy Screening (Yearly) Overdue since 2/27/2024 02/27/2023  AMB EXTERNAL RETINAL SCREENING RESULTS    02/24/2022  REFERRAL FOR RETINAL SCREENING EXAM    03/25/2021  REFERRAL FOR RETINAL SCREENING EXAM    03/25/2021  REFERRAL FOR RETINAL SCREENING EXAM              Hepatitis B Vaccine (Hep B) (3 of 3 - 19+ 3-dose series) Next due on 5/21/2024 12/27/2023  Imm Admin: Hepatitis B Vaccine (Adol/Adult)    11/21/2023  Imm Admin: Hepatitis B Vaccine (Adol/Adult)              A1c Screening (Every 6 Months) Next due on 9/18/2024 03/18/2024  HEMOGLOBIN A1C    11/21/2023  POCT A1C    01/10/2023  POCT Hemoglobin A1C    01/10/2023  Hemoglobin A1c    06/21/2022  HEMOGLOBIN A1C    Only the first 5 history entries have been loaded, but more history exists.              Diabetes:  Monofilament / LE Exam (Yearly) Next due on 11/21/2024 11/21/2023  Diabetic Monofilament Lower Extremity Exam    11/21/2023  [employer]: FINDINGS - TESTS - NEUROLOGY - MONOFILAMENT TEST - RIGHT FOOT - NUMBER OF SITES TESTED    11/21/2023  [employer]: FINDINGS - TESTS - NEUROLOGY - MONOFILAMENT TEST - LEFT FOOT - NUMBER OF SITES TESTED    01/10/2023  Diabetic Monofilament LE Exam    01/10/2023  [employer]: WORKFLOW - DIABETES - DIABETIC FOOT EXAM PERFORMED    Only the first 5 history entries have been loaded, but more history exists.              Diabetes: Urine Protein Screening (Yearly) Next due on 12/19/2024 12/19/2023  Microalbumin Creat Ratio Urine - Lab Collect    01/10/2023  MICROALBUMIN CREAT RATIO URINE    01/11/2022  MICROALBUMIN CREAT RATIO URINE    01/18/2021  MICROALBUMIN CREAT RATIO URINE    11/15/2018  MICROALBUMIN CREAT RATIO URINE    Only the first 5 history entries have been loaded, but more history exists.              Fasting Lipid Profile (Yearly) Next due on 3/18/2025      03/18/2024  Lipid Profile    12/19/2023  Lipid Profile    06/21/2022  Lipid Profile    06/24/2021  Lipid Profile    01/18/2021  Lipid Profile    Only the first 5 history entries have been loaded, but more history exists.              SERUM CREATININE (Yearly) Next due on 3/18/2025      03/18/2024  Basic Metabolic Panel    12/19/2023  Comp Metabolic Panel    12/19/2023  CREATININE    10/05/2023  CREATININE    07/31/2023  Comp Metabolic Panel    Only the first 5 history entries have been loaded, but more history exists.              Annual Wellness Visit (Yearly) Next due on 3/21/2025      03/21/2024  Done    03/21/2024  Visit Dx: Encounter for Medicare annual wellness exam    03/21/2024  Level of Service: [place] ANNUAL WELLNESS VISIT-INCLUDES PPPS SUBSEQUE*    05/17/2022  Level of Service: [place] ANNUAL WELLNESS VISIT-INCLUDES PPPS SUBSEQUE*    06/10/2021  Visit Dx: Medicare annual wellness visit, subsequent    Only the first 5  history entries have been loaded, but more history exists.              Bone Density Scan (Every 5 Years) Next due on 11/12/2026 11/12/2021  DS-BONE DENSITY STUDY (DEXA)    07/02/2020  DS-BONE DENSITY STUDY (DEXA)    09/30/2016  DS-BONE DENSITY STUDY (DEXA)              IMM DTaP/Tdap/Td Vaccine (2 - Td or Tdap) Next due on 4/18/2029 04/18/2019  Imm Admin: Tdap Vaccine    12/10/2008  Imm Admin: TD Vaccine              Pneumococcal Vaccine: 65+ Years (Series Information) Completed      03/09/2015  Imm Admin: Pneumococcal Conjugate Vaccine (Prevnar/PCV-13)    12/10/2008  Imm Admin: Pneumococcal polysaccharide vaccine (PPSV-23)              Zoster (Shingles) Vaccines (Series Information) Completed      11/09/2021  Imm Admin: Zoster Vaccine Recombinant (RZV) (SHINGRIX)    06/10/2021  Imm Admin: Zoster Vaccine Recombinant (RZV) (SHINGRIX)    04/06/2012  Imm Admin: Zoster Vaccine Live (ZVL) (Zostavax) - HISTORICAL DATA              Influenza Vaccine (Series Information) Completed      09/03/2023  Imm Admin: Influenza Vaccine Adult HD    10/10/2022  Imm Admin: Influenza Vaccine Adult HD    11/09/2021  Imm Admin: Influenza Vaccine Adult HD    09/26/2020  Imm Admin: Influenza Vaccine Adult HD    12/18/2018  Imm Admin: Influenza Vaccine Quad Inj (Pf)    Only the first 5 history entries have been loaded, but more history exists.              Hepatitis A Vaccine (Hep A) (Series Information) Aged Out      No completion history exists for this topic.              HPV Vaccines (Series Information) Aged Out      No completion history exists for this topic.              Polio Vaccine (Inactivated Polio) (Series Information) Aged Out      No completion history exists for this topic.              Meningococcal Immunization (Series Information) Aged Out      No completion history exists for this topic.              Discontinued - Mammogram  Discontinued        Frequency changed to Never automatically (Topic No Longer Applies)     02/15/2022  MA-SCREENING MAMMO BILAT W/TOMOSYNTHESIS W/CAD    11/09/2020  MA-SCREENING MAMMO BILAT W/TOMOSYNTHESIS W/CAD    11/06/2019  MA-SCREENING MAMMO BILAT W/TOMOSYNTHESIS W/CAD    10/17/2018  MA-SCREENING MAMMO BILAT W/TOMOSYNTHESIS W/CAD    Only the first 5 history entries have been loaded, but more history exists.              Discontinued - Cervical Cancer Screening  Discontinued        Frequency changed to Never automatically (Topic No Longer Applies)    01/08/2021  Done              Discontinued - Colorectal Cancer Screening  Discontinued        Frequency changed to Never automatically (Topic No Longer Applies)    01/08/2021  Colonoscopy (Done)                    Patient Care Team:  Renny Desouza M.D. as PCP - General (Internal Medicine)  Renny Desouza M.D. as PCP - [facility] Paneled (Internal Medicine)        Social History     Tobacco Use    Smoking status: Never    Smokeless tobacco: Never   Vaping Use    Vaping Use: Never used   Substance Use Topics    Alcohol use: No     Alcohol/week: 0.0 oz    Drug use: No     Family History   Problem Relation Age of Onset    Cancer Sister         breast     She  has a past medical history of Acute maculopapular rash (01/10/2023), CATARACT (2009), Cellulitis of left wrist (11/21/2023), Diabetes (2007), DJD (degenerative joint disease) of thoracic spine, High cholesterol, Hypertension, Liver disorder, Pain, RA (rheumatoid arthritis) (HCC), 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   Past Surgical History:   Procedure Laterality Date    DC ERCP,DIAGNOSTIC N/A 2/2/2023    Procedure: ENDOSCOPIC RETROGRADE CHOLANGIOPANCREATOGRAPHY AND ENDOSCOPIC ULTRASOUND;  Surgeon: Mickey Terrazas M.D.;  Location: SURGERY [facility];  Service: Gastroenterology    EGD W/ENDOSCOPIC ULTRASOUND N/A 2/2/2023    Procedure: EGD, WITH ENDOSCOPIC US;  Surgeon: Mickey Terrazas M.D.;  Location: SURGERY [facility];  Service: Gastroenterology    DC  "ERCP,DIAGNOSTIC N/A 1/17/2023    Procedure: ENDOSCOPIC RETROGRADE CHOLANGIOPANCREATOGRAPHY;  Surgeon: Milton Walker M.D.;  Location: SURGERY [facility];  Service: Gastroenterology    CERVICAL DISK AND FUSION ANTERIOR  7/20/2020    Procedure: DISCECTOMY, SPINE, CERVICAL, ANTERIOR APPROACH, WITH FUSION- C4-6;  Surgeon: Ezra Bailey M.D.;  Location: SURGERY [facility];  Service: Neurosurgery    SHOSHANA BY LAPAROSCOPY  8/21/2012    Performed by CAMMIE HUGHES at SURGERY [facility]    FUSION, SPINE, LUMBAR, PLIF  5/6/2010    Performed by CASSI RESTREPO at [facility]    LUMBAR DECOMPRESSION  5/6/2010    Performed by CASSI RESTREPO at SURGERY [facility]    LUMBAR LAMINECTOMY DISKECTOMY  12/26/2009    Performed by CASSI RESTREPO at SURGERY [facility]    CATARACT EXT W/IOL  6/2009    bilateral    VAGINAL HYSTERECTOMY TOTAL  1976    Hysterectomy,Total Vaginal       Exam:   /60 (BP Location: Left arm, Patient Position: Sitting, BP Cuff Size: Adult)   Pulse 90   Temp 36.8 °C (98.3 °F) (Temporal)   Ht 1.549 m (5' 1\")   Wt 63 kg (139 lb)   SpO2 92%  Body mass index is 26.26 kg/m².    Hearing fair.    Dentition fair  Alert, oriented in no acute distress.  Eye contact is good, speech goal directed, affect calm    Physical Exam  Vitals reviewed.   Constitutional:       General: She is not in acute distress.     Appearance: She is not ill-appearing or diaphoretic.   HENT:      Head: Normocephalic and atraumatic.      Right Ear: External ear normal.      Left Ear: External ear normal.      Nose: No rhinorrhea.      Mouth/Throat:      Pharynx: No oropharyngeal exudate.   Eyes:      General: No scleral icterus.        Right eye: No discharge.         Left eye: No discharge.      Extraocular Movements: Extraocular movements intact.   Cardiovascular:      Rate and Rhythm: Normal rate and regular rhythm.      Heart sounds: Normal heart sounds. No murmur heard.  Pulmonary:      Effort: " Pulmonary effort is normal. No respiratory distress.      Breath sounds: Normal breath sounds. No stridor. No wheezing, rhonchi or rales.   Abdominal:      General: There is no distension.      Palpations: Abdomen is soft. There is no mass.      Tenderness: There is no abdominal tenderness. There is no guarding or rebound.   Musculoskeletal:      Cervical back: No rigidity or tenderness.      Right lower leg: No edema.      Left lower leg: No edema.      Comments: No midline tenderness, slight tenderness on R paraspinal area on palpation.   Lymphadenopathy:      Cervical: No cervical adenopathy.   Skin:     Capillary Refill: Capillary refill takes less than 2 seconds.   Neurological:      General: No focal deficit present.      Mental Status: She is alert and oriented to person, place, and time. Mental status is at baseline.   Psychiatric:         Mood and Affect: Mood normal.         Behavior: Behavior normal.         Judgment: Judgment normal.        Assessment and Plan. The following treatment and monitoring plan is recommended:      Problem   Lack of Adequate Sleep    Only sleep 5 hours a day. Sleep medicine did not work well for her in the past.       Senile Purpura (Hcc)    Sometimes gets bruising, no claudication in legs, on statin, not on baby aspirin     Other Low Back Pain    Chronic, stable,  Continues to have back pain.  Pt does not want to do physical therapy anymore, didn't work. She is no longer doing physical therapy, however does back exercises at home.     Hypertension Associated With Diabetes (Hcc)    Chronic condition.  Used to be on spironolactone also.    Patient tolerating and reports compliant with medications. She does not regularly monitor blood pressure at home. Does not need refill of medications today. Denies headache, dizziness, vision changes, chest pain, dyspnea, edema.    Current medication regimen: amlodipine 5mg daily, HCTZ 12.5mg daily, losartan 100mg daily     Hypodense Mass of  Liver    Chronic condition. Likely benign, not changed on imaging.  US liver 06/2021 showed 2.3 cm solid hypoechoic mass superior to the liver. Additional 2.6 cm hypoechoic mass in the peripheral posterior right hepatic lobe with punctate echogenic foci in the periphery. The masses was seen on prior CT and are   indeterminant. Continued follow-up is recommended.  *  CT abdomen in January 2023 showed  Intermediate density nodular cyst along the anterior margin of the liver, of uncertain significance, overall similar compared to prior study.      Secondary Hyperaldosteronism (Hcc) (Resolved)    Used to be on spironolactone and not on it anymore     Acute Maculopapular Rash (Resolved)    Acute condition. She has been having a rash to the right side of temple for the past few days which she scratched.          1. Encounter for Medicare annual wellness exam    2. Other low back pain  - Referral to Massage Therapy  - Referral for Acupuncture    3. Hypertension associated with diabetes (HCC)    4. Lack of adequate sleep    5. Atherosclerosis of aorta (HCC)    6. Hyperlipidemia associated with type 2 diabetes mellitus (HCC)    7. Microalbuminuria due to type 2 diabetes mellitus (HCC)    8. Type 2 diabetes mellitus with other circulatory complication, without long-term current use of insulin (HCC)    9. Rheumatoid arthritis involving multiple joints (HCC)    10. Senile purpura (HCC)    11. Hypodense mass of liver    Gave instructions as below:   -Placed referral to acupuncture and massage therapy  -Keep using heating pad.  -Apply salon pas to back.  -Okay to take tylenol (up to 4000 mg a day), ibuprofen (600 mg at a time and no more than 3 times in a day) only if tylenol does not work.  -try to sleep minimum 6 hours at night. 7 hours is the best. Don't sleep more than 9 hours. Avoid taking naps.    Services suggested: No services needed at this time  Health Care Screening: Age-appropriate preventive services recommended by  USPTF and ACIP covered by Medicare were discussed today. Services ordered if indicated and agreed upon by the patient.  Referrals offered: Community-based lifestyle interventions to reduce health risks and promote self-management and wellness, fall prevention, nutrition, physical activity, tobacco-use cessation, weight loss, and mental health services as per orders if indicated.    Discussion today about general wellness and lifestyle habits:    Prevent falls and reduce trip hazards; Cautioned about securing or removing rugs.  Have a working fire alarm and carbon monoxide detector;   Engage in regular physical activity and social activities     Follow-up: Return in about 6 months (around 9/21/2024).

## 2024-03-21 NOTE — PATIENT INSTRUCTIONS
-Placed referral to acupuncture and massage therapy  -Keep using heating pad.  -Apply salon pas to back.  -Okay to take tylenol (up to 4000 mg a day), ibuprofen (600 mg at a time and no more than 3 times in a day) only if tylenol does not work.  -try to sleep minimum 6 hours at night. 7 hours is the best. Don't sleep more than 9 hours. Avoid taking naps.

## 2024-04-18 DIAGNOSIS — I10 PRIMARY HYPERTENSION: ICD-10-CM

## 2024-04-18 RX ORDER — SPIRONOLACTONE 25 MG/1
25 TABLET ORAL DAILY
Qty: 100 TABLET | Refills: 0 | Status: SHIPPED | OUTPATIENT
Start: 2024-04-18

## 2024-04-18 RX ORDER — HYDROCHLOROTHIAZIDE 12.5 MG/1
12.5 CAPSULE, GELATIN COATED ORAL DAILY
Qty: 100 CAPSULE | Refills: 0 | Status: SHIPPED | OUTPATIENT
Start: 2024-04-18

## 2024-04-24 ENCOUNTER — HOSPITAL ENCOUNTER (OUTPATIENT)
Dept: LAB | Facility: MEDICAL CENTER | Age: 84
End: 2024-04-24
Attending: INTERNAL MEDICINE
Payer: MEDICARE

## 2024-04-24 LAB
ALT SERPL-CCNC: 19 U/L (ref 2–50)
AST SERPL-CCNC: 27 U/L (ref 12–45)
BASOPHILS # BLD AUTO: 0.5 % (ref 0–1.8)
BASOPHILS # BLD: 0.03 K/UL (ref 0–0.12)
CREAT SERPL-MCNC: 0.79 MG/DL (ref 0.5–1.4)
EOSINOPHIL # BLD AUTO: 0.14 K/UL (ref 0–0.51)
EOSINOPHIL NFR BLD: 2.5 % (ref 0–6.9)
ERYTHROCYTE [DISTWIDTH] IN BLOOD BY AUTOMATED COUNT: 54.7 FL (ref 35.9–50)
ERYTHROCYTE [SEDIMENTATION RATE] IN BLOOD BY WESTERGREN METHOD: 20 MM/HOUR (ref 0–25)
GFR SERPLBLD CREATININE-BSD FMLA CKD-EPI: 75 ML/MIN/1.73 M 2
HCT VFR BLD AUTO: 39.6 % (ref 37–47)
HGB BLD-MCNC: 12.7 G/DL (ref 12–16)
IMM GRANULOCYTES # BLD AUTO: 0.02 K/UL (ref 0–0.11)
IMM GRANULOCYTES NFR BLD AUTO: 0.4 % (ref 0–0.9)
LYMPHOCYTES # BLD AUTO: 1.43 K/UL (ref 1–4.8)
LYMPHOCYTES NFR BLD: 25.5 % (ref 22–41)
MCH RBC QN AUTO: 34.6 PG (ref 27–33)
MCHC RBC AUTO-ENTMCNC: 32.1 G/DL (ref 32.2–35.5)
MCV RBC AUTO: 107.9 FL (ref 81.4–97.8)
MONOCYTES # BLD AUTO: 0.64 K/UL (ref 0–0.85)
MONOCYTES NFR BLD AUTO: 11.4 % (ref 0–13.4)
NEUTROPHILS # BLD AUTO: 3.34 K/UL (ref 1.82–7.42)
NEUTROPHILS NFR BLD: 59.7 % (ref 44–72)
NRBC # BLD AUTO: 0 K/UL
NRBC BLD-RTO: 0 /100 WBC (ref 0–0.2)
PLATELET # BLD AUTO: 230 K/UL (ref 164–446)
PMV BLD AUTO: 8.8 FL (ref 9–12.9)
RBC # BLD AUTO: 3.67 M/UL (ref 4.2–5.4)
WBC # BLD AUTO: 5.6 K/UL (ref 4.8–10.8)

## 2024-04-24 PROCEDURE — 84460 ALANINE AMINO (ALT) (SGPT): CPT

## 2024-04-24 PROCEDURE — 82565 ASSAY OF CREATININE: CPT

## 2024-04-24 PROCEDURE — 85652 RBC SED RATE AUTOMATED: CPT

## 2024-04-24 PROCEDURE — 85025 COMPLETE CBC W/AUTO DIFF WBC: CPT

## 2024-04-24 PROCEDURE — 84450 TRANSFERASE (AST) (SGOT): CPT

## 2024-04-24 PROCEDURE — 36415 COLL VENOUS BLD VENIPUNCTURE: CPT

## 2024-06-06 ENCOUNTER — TELEPHONE (OUTPATIENT)
Dept: HEALTH INFORMATION MANAGEMENT | Facility: OTHER | Age: 84
End: 2024-06-06
Payer: MEDICARE

## 2024-06-10 PROBLEM — D84.9 IMMUNOSUPPRESSED STATUS (HCC): Status: ACTIVE | Noted: 2024-06-10

## 2024-06-10 NOTE — ASSESSMENT & PLAN NOTE
Chronic, stable. Last A1c in March 2024 was 6.7. She does check her blood sugar at home. Currently takes metformin 1000 mg BID & Actos 15 mg daily. Last retinal screening was April 2024 and showed no DR. Last microalbumin creat ratio in Dec 2023 was 31. Her PCP does her feet exams. She is on a statin. We discussed diet and lifestyle habits. Follow up with PCP for continued monitoring and management.

## 2024-06-10 NOTE — ASSESSMENT & PLAN NOTE
Chronic, stable. Last lipid panel in March 2024 was WNL. She currently takes atorvastatin 40 mg daily. We discussed her dietary/lifestyle regimen. Follow up with PCP for continued monitoring and management.

## 2024-06-10 NOTE — ASSESSMENT & PLAN NOTE
Chronic, Stable. BP in office today is 102/50. She does not check her BP at home. She currently takes amlodipine 5 mg daily, hctz 12.5 mg daily, and spironolactone 25 mg daily. Follow up with PCP for continued monitoring and management.

## 2024-06-10 NOTE — ASSESSMENT & PLAN NOTE
Chronic, stable. Primarily in her hands. She takes methotrexate 10 mg weekly and folic acid 1 mg daily. This medication does place her at an immunosuppressed status. She denies recurrent infections but has a spot on her L hand that has been consistently bruised/bleeding. She follows with rheumatology.

## 2024-06-10 NOTE — ASSESSMENT & PLAN NOTE
Chronic, stable. Found on prior imaging in 2023. Patient currently takes atorvastatin 40 mg daily. Follow up with PCP for continued monitoring.

## 2024-06-11 ENCOUNTER — OFFICE VISIT (OUTPATIENT)
Dept: FAMILY PLANNING/WOMEN'S HEALTH CLINIC | Facility: PHYSICIAN GROUP | Age: 84
End: 2024-06-11
Payer: MEDICARE

## 2024-06-11 VITALS
DIASTOLIC BLOOD PRESSURE: 50 MMHG | SYSTOLIC BLOOD PRESSURE: 102 MMHG | HEIGHT: 61 IN | BODY MASS INDEX: 26.62 KG/M2 | WEIGHT: 141 LBS

## 2024-06-11 DIAGNOSIS — E11.29 MICROALBUMINURIA DUE TO TYPE 2 DIABETES MELLITUS (HCC): ICD-10-CM

## 2024-06-11 DIAGNOSIS — R80.9 MICROALBUMINURIA DUE TO TYPE 2 DIABETES MELLITUS (HCC): ICD-10-CM

## 2024-06-11 DIAGNOSIS — D84.9 IMMUNOSUPPRESSED STATUS (HCC): ICD-10-CM

## 2024-06-11 DIAGNOSIS — Z99.89 USES ROLLER WALKER: ICD-10-CM

## 2024-06-11 DIAGNOSIS — I15.2 HYPERTENSION ASSOCIATED WITH DIABETES (HCC): ICD-10-CM

## 2024-06-11 DIAGNOSIS — E78.5 HYPERLIPIDEMIA ASSOCIATED WITH TYPE 2 DIABETES MELLITUS (HCC): ICD-10-CM

## 2024-06-11 DIAGNOSIS — E11.59 HYPERTENSION ASSOCIATED WITH DIABETES (HCC): ICD-10-CM

## 2024-06-11 DIAGNOSIS — I70.0 ATHEROSCLEROSIS OF AORTA (HCC): ICD-10-CM

## 2024-06-11 DIAGNOSIS — E11.59 TYPE 2 DIABETES MELLITUS WITH OTHER CIRCULATORY COMPLICATION, WITHOUT LONG-TERM CURRENT USE OF INSULIN (HCC): ICD-10-CM

## 2024-06-11 DIAGNOSIS — E11.69 HYPERLIPIDEMIA ASSOCIATED WITH TYPE 2 DIABETES MELLITUS (HCC): ICD-10-CM

## 2024-06-11 DIAGNOSIS — M06.9 RHEUMATOID ARTHRITIS INVOLVING MULTIPLE JOINTS (HCC): ICD-10-CM

## 2024-06-11 DIAGNOSIS — M54.16 LUMBAR RADICULOPATHY: ICD-10-CM

## 2024-06-11 PROCEDURE — 3078F DIAST BP <80 MM HG: CPT

## 2024-06-11 PROCEDURE — G0439 PPPS, SUBSEQ VISIT: HCPCS

## 2024-06-11 PROCEDURE — 3074F SYST BP LT 130 MM HG: CPT

## 2024-06-11 PROCEDURE — 1126F AMNT PAIN NOTED NONE PRSNT: CPT

## 2024-06-11 RX ORDER — LOSARTAN POTASSIUM 100 MG/1
100 TABLET ORAL DAILY
COMMUNITY
Start: 2024-06-06 | End: 2024-06-11 | Stop reason: SDUPTHER

## 2024-06-11 RX ORDER — LOSARTAN POTASSIUM 100 MG/1
100 TABLET ORAL DAILY
Qty: 100 TABLET | Refills: 3 | Status: SHIPPED | OUTPATIENT
Start: 2024-06-11

## 2024-06-11 RX ORDER — PIOGLITAZONEHYDROCHLORIDE 15 MG/1
15 TABLET ORAL DAILY
Qty: 100 TABLET | Refills: 3 | Status: SHIPPED | OUTPATIENT
Start: 2024-06-11

## 2024-06-11 SDOH — ECONOMIC STABILITY: FOOD INSECURITY: WITHIN THE PAST 12 MONTHS, YOU WORRIED THAT YOUR FOOD WOULD RUN OUT BEFORE YOU GOT MONEY TO BUY MORE.: NEVER TRUE

## 2024-06-11 SDOH — ECONOMIC STABILITY: FOOD INSECURITY: WITHIN THE PAST 12 MONTHS, THE FOOD YOU BOUGHT JUST DIDN'T LAST AND YOU DIDN'T HAVE MONEY TO GET MORE.: NEVER TRUE

## 2024-06-11 SDOH — ECONOMIC STABILITY: HOUSING INSECURITY
IN THE LAST 12 MONTHS, WAS THERE A TIME WHEN YOU DID NOT HAVE A STEADY PLACE TO SLEEP OR SLEPT IN A SHELTER (INCLUDING NOW)?: NO

## 2024-06-11 SDOH — ECONOMIC STABILITY: INCOME INSECURITY: IN THE LAST 12 MONTHS, WAS THERE A TIME WHEN YOU WERE NOT ABLE TO PAY THE MORTGAGE OR RENT ON TIME?: NO

## 2024-06-11 SDOH — ECONOMIC STABILITY: HOUSING INSECURITY: IN THE LAST 12 MONTHS, HOW MANY PLACES HAVE YOU LIVED?: 1

## 2024-06-11 SDOH — ECONOMIC STABILITY: TRANSPORTATION INSECURITY
IN THE PAST 12 MONTHS, HAS THE LACK OF TRANSPORTATION KEPT YOU FROM MEDICAL APPOINTMENTS OR FROM GETTING MEDICATIONS?: NO

## 2024-06-11 SDOH — ECONOMIC STABILITY: TRANSPORTATION INSECURITY
IN THE PAST 12 MONTHS, HAS LACK OF TRANSPORTATION KEPT YOU FROM MEETINGS, WORK, OR FROM GETTING THINGS NEEDED FOR DAILY LIVING?: NO

## 2024-06-11 SDOH — ECONOMIC STABILITY: INCOME INSECURITY: HOW HARD IS IT FOR YOU TO PAY FOR THE VERY BASICS LIKE FOOD, HOUSING, MEDICAL CARE, AND HEATING?: NOT HARD AT ALL

## 2024-06-11 ASSESSMENT — PAIN SCALES - GENERAL: PAINLEVEL: NO PAIN

## 2024-06-11 ASSESSMENT — ACTIVITIES OF DAILY LIVING (ADL): BATHING_REQUIRES_ASSISTANCE: 0

## 2024-06-11 ASSESSMENT — PATIENT HEALTH QUESTIONNAIRE - PHQ9: CLINICAL INTERPRETATION OF PHQ2 SCORE: 0

## 2024-06-11 ASSESSMENT — ENCOUNTER SYMPTOMS: GENERAL WELL-BEING: FAIR

## 2024-06-11 ASSESSMENT — FIBROSIS 4 INDEX: FIB4 SCORE: 2.18

## 2024-06-11 NOTE — PROGRESS NOTES
Comprehensive Health Assessment Program     Jewell Diaz is a 83 y.o. here for her comprehensive health assessment.    Patient Active Problem List    Diagnosis Date Noted    Immunosuppressed status (HCC) 06/10/2024    Lack of adequate sleep 03/21/2024    Senile purpura (HCC) 03/21/2024    Other low back pain 03/21/2024    Environmental allergies 12/27/2023    Microalbuminuria due to type 2 diabetes mellitus (HCC) 12/20/2023    LUQ pain 07/31/2023    Macrocytosis without anemia 07/11/2023    Uses roller walker 07/11/2023    Cellulitis of face 05/25/2023    Chronic cough 05/25/2023    Hypertension associated with diabetes ([facility]) 02/13/2023    Hyperlipidemia associated with type 2 diabetes mellitus (HCC) 02/13/2023    Chronic insomnia 01/24/2023    Choledocholithiasis 01/17/2023    Post-ERCP acute pancreatitis 01/17/2023    Hypodense mass of liver 07/12/2022    Nonspecific abnormal function study, cardiovascular 05/17/2022    Allergic rhinitis 01/11/2022    Atherosclerosis of aorta (HCC) 06/10/2021    PND (post-nasal drip) 01/08/2021    Rheumatoid arthritis involving multiple joints (HCC) 02/24/2014    Biliary calculus 08/20/2012    Lumbar radiculopathy 05/11/2010    Type 2 diabetes mellitus with circulatory disorder, without long-term current use of insulin (HCC) 05/11/2010       Current Outpatient Medications   Medication Sig Dispense Refill    losartan (COZAAR) 100 MG Tab Take 100 mg by mouth every day.      metformin (GLUCOPHAGE) 1000 MG tablet Take 1,000 mg by mouth 2 times a day with meals.      spironolactone (ALDACTONE) 25 MG Tab Take 1 tablet by mouth once daily 100 Tablet 0    hydrochlorothiazide (MICROZIDE) 12.5 MG capsule Take 1 capsule by mouth once daily 100 Capsule 0    amLODIPine (NORVASC) 5 MG Tab Take 1 Tablet by mouth every day. 100 Tablet 2    atorvastatin (LIPITOR) 40 MG Tab Take 1 Tablet by mouth every evening. 100 Tablet 2    diclofenac sodium (VOLTAREN) 1 % Gel APPLY 2 TO 3  GRAMS ON THE SKIN TO ANY AREA YOU HAVE PAIN THREE TIMES TO FOUR TIMES DAILY AS NEEDED      lidocaine (XYLOCAINE) 5 % Ointment APPLY 5 GRAMS OR 6 INCH STRIP WITH SWAB TO PAINFUL AREAS UP TO 4 TIMES DAILY      pioglitazone (ACTOS) 15 MG Tab Take 1 tablet by mouth once daily 100 Tablet 3    CALCIUM PO Take 1 Tablet by mouth 2 times a day.      Cholecalciferol (D3 PO) Take 1 Capsule by mouth every evening.      Ferrous Sulfate (IRON PO) Take 1 Tablet by mouth every day. OTC      glucose blood strip 1 Strip by Other route every day. 100 Strip 3    dorzolamide-timolol (COSOPT) 22.3-6.8 MG/ML Solution Administer 1 Drop into both eyes 2 times a day.      methotrexate 2.5 MG tablet Take 10 mg by mouth every 7 days. 4 tablets on Thursday      folic acid (FOLVITE) 1 MG Tab Take 1 mg by mouth every day.      multivitamin (THERAGRAN) TABS Take 1 Tab by mouth every day.      omeprazole (PRILOSEC) 20 MG CPDR Take 20 mg by mouth every morning.       No current facility-administered medications for this visit.          Current supplements as per medication list.     Allergies:   Patient has no known allergies.  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   Past Surgical History:   Procedure Laterality Date    [place] ERCP,DIAGNOSTIC N/A 2/2/2023    Procedure: ENDOSCOPIC RETROGRADE CHOLANGIOPANCREATOGRAPHY AND ENDOSCOPIC  ULTRASOUND;  Surgeon: Mickey Terrazas M.D.;  Location: [facility];  Service: Gastroenterology    EGD W/ENDOSCOPIC ULTRASOUND N/A 2/2/2023    Procedure: EGD, WITH ENDOSCOPIC US;  Surgeon: Mickey Terrazas M.D.;  Location: [facility];  Service: Gastroenterology    AL ERCP,DIAGNOSTIC N/A 1/17/2023    Procedure: ENDOSCOPIC RETROGRADE CHOLANGIOPANCREATOGRAPHY;  Surgeon: Milton Walker M.D.;  Location: [facility];  Service: Gastroenterology    CERVICAL DISK AND FUSION ANTERIOR  7/20/2020    Procedure: DISCECTOMY, SPINE, CERVICAL, ANTERIOR APPROACH, WITH FUSION- C4-6;  Surgeon: Ezra Bailey M.D.;  Location: [facility];  Service: Neurosurgery    SHOSHANA BY LAPAROSCOPY  8/21/2012    Performed by CAMMIE HUGHES at [facility]    FUSION, SPINE, LUMBAR, PLIF  5/6/2010    Performed by CASSI RESTREPO at [facility]    LUMBAR DECOMPRESSION  5/6/2010    Performed by CASSI RESTREPO at [facility]    LUMBAR LAMINECTOMY DISKECTOMY  12/26/2009    Performed by CASSI RESTREPO at [facility]    CATARACT EXT W/IOL  6/2009    bilateral    VAGINAL HYSTERECTOMY TOTAL  1976    Hysterectomy,Total Vaginal       Screening:  In the last six months have you experienced any leakage of urine? No    Depression Screening  Little interest or pleasure in doing things?  0 - not at all  Feeling down, depressed , or hopeless? 0 - not at all  Patient Health Questionnaire Score: 0     If depressive symptoms identified deferred to follow up visit unless specifically addressed in assessment and plan.    Interpretation of PHQ-9 Total Score   Score Severity   1-4 No Depression   5-9 Mild Depression   10-14 Moderate Depression   15-19 Moderately Severe Depression   20-27 Severe Depression    Screening for Cognitive Impairment  Do you or any of your friends or family members have any concern about your memory? No  Three Minute Recall (Leader, Season, Table) 3/3    Matt  clock face with all 12 numbers and set the hands to show 10 minutes after 11.  Yes    Cognitive concerns identified deferred for follow up unless specifically addressed in assessment and plan.    Fall Risk Assessment  Has the patient had two or more falls in the last year or any fall with injury in the last year?  No She will use a cane & walker.     Safety Assessment  Do you always wear your seatbelt?  Yes  Any changes to home needed to function safely? No  Difficulty hearing.  No  Patient counseled about all safety risks that were identified.    Functional Assessment ADLs  Are there any barriers preventing you from cooking for yourself or meeting nutritional needs?  No.    Are there any barriers preventing you from driving safely or obtaining transportation?  Yes. Does not drive anymore   Are there any barriers preventing you from using a telephone or calling for help?  No    Are there any barriers preventing you from shopping?  No.    Are there any barriers preventing you from taking care of your own finances?  No    Are there any barriers preventing you from managing your medications?  No    Are there any barriers preventing you from showering, bathing or dressing yourself? No    Are there any barriers preventing you from doing housework or laundry? No  Are there any barriers preventing you from using the toilet?No  Are you currently engaging in any exercise or physical activity?  Yes. Stretching everyday and exercises in the morning     Self-Assessment of Health  What is your perception of your health? Fair  Do you sleep more than six hours a night? Yes  In the past 7 days, how much did pain keep you from doing your normal work? None  Do you spend quality time with family or friends (virtually or in person)? Yes  Do you usually eat a heart healthy diet that constists of a variety of fruits, vegetables, whole grains and fiber? Yes  Do you eat foods high in fat and/or Fast Food more than three times per week?  No    Advance Care Planning  Do you have an Advance Directive, Living Will, Durable Power of , or POLST? Yes  Advance Directive Living Will Durable Power of    is on file      Health Maintenance Summary            Overdue - COVID-19 Vaccine (4 - 2023-24 season) Overdue since 9/1/2023 11/16/2021  Imm Admin: MODERNA SARS-COV-2 VACCINE (12+)    03/11/2021  Imm Admin: MODERNA SARS-COV-2 VACCINE (12+)    02/11/2021  Imm Admin: MODERNA SARS-COV-2 VACCINE (12+)              Overdue - Hepatitis B Vaccine (Hep B) (3 of 3 - 19+ 3-dose series) Overdue since 5/21/2024 12/27/2023  Imm Admin: Hepatitis B Vaccine (Adol/Adult)    11/21/2023  Imm Admin: Hepatitis B Vaccine (Adol/Adult)              A1c Screening (Every 6 Months) Next due on 9/18/2024 03/18/2024  HEMOGLOBIN A1C    11/21/2023  POCT A1C    01/10/2023  POCT Hemoglobin A1C    01/10/2023  Hemoglobin A1c    06/21/2022  HEMOGLOBIN A1C    Only the first 5 history entries have been loaded, but more history exists.              Diabetes: Monofilament / LE Exam (Yearly) Next due on 11/21/2024 11/21/2023  Diabetic Monofilament Lower Extremity Exam    11/21/2023  Smart[employer]: FINDINGS - TESTS - NEUROLOGY - MONOFILAMENT TEST - RIGHT FOOT - NUMBER OF SITES TESTED    11/21/2023  Smart[employer]: FINDINGS - TESTS - NEUROLOGY - MONOFILAMENT TEST - LEFT FOOT - NUMBER OF SITES TESTED    01/10/2023  Diabetic Monofilament LE Exam    01/10/2023  Smart[employer]: WORKFLOW - DIABETES - DIABETIC FOOT EXAM PERFORMED    Only the first 5 history entries have been loaded, but more history exists.              Diabetes: Urine Protein Screening (Yearly) Next due on 12/19/2024 12/19/2023  Microalbumin Creat Ratio Urine - Lab Collect    01/10/2023  MICROALBUMIN CREAT RATIO URINE    01/11/2022  MICROALBUMIN CREAT RATIO URINE    01/18/2021  MICROALBUMIN CREAT RATIO URINE    11/15/2018  MICROALBUMIN CREAT RATIO URINE    Only the first 5 history entries have been loaded, but  more history exists.              Fasting Lipid Profile (Yearly) Next due on 3/18/2025      03/18/2024  Lipid Profile    12/19/2023  Lipid Profile    06/21/2022  Lipid Profile    06/24/2021  Lipid Profile    01/18/2021  Lipid Profile    Only the first 5 history entries have been loaded, but more history exists.              Diabetes: Retinopathy Screening (Yearly) Next due on 4/4/2025 04/04/2024  AMB EXTERNAL RETINAL SCREENING RESULTS    02/27/2023  AMB EXTERNAL RETINAL SCREENING RESULTS    02/24/2022  REFERRAL FOR RETINAL SCREENING EXAM    03/25/2021  REFERRAL FOR RETINAL SCREENING EXAM    03/25/2021  REFERRAL FOR RETINAL SCREENING EXAM    Only the first 5 history entries have been loaded, but more history exists.              SERUM CREATININE (Yearly) Next due on 4/24/2025 04/24/2024  CREATININE    03/18/2024  Basic Metabolic Panel    12/19/2023  Comp Metabolic Panel    12/19/2023  CREATININE    10/05/2023  CREATININE    Only the first 5 history entries have been loaded, but more history exists.              Annual Wellness Visit (Yearly) Next due on 6/11/2025 06/11/2024  Level of Service: ND ANNUAL WELLNESS VISIT-INCLUDES PPPS SUBSEQUE*    03/21/2024  Done    03/21/2024  Visit Dx: Encounter for Medicare annual wellness exam    03/21/2024  Level of Service: ND ANNUAL WELLNESS VISIT-INCLUDES PPPS SUBSEQUE*    05/17/2022  Level of Service: ND ANNUAL WELLNESS VISIT-INCLUDES PPPS SUBSEQUE*    Only the first 5 history entries have been loaded, but more history exists.              Bone Density Scan (Every 5 Years) Next due on 11/12/2026 11/12/2021  DS-BONE DENSITY STUDY (DEXA)    07/02/2020  DS-BONE DENSITY STUDY (DEXA)    09/30/2016  DS-BONE DENSITY STUDY (DEXA)              IMM DTaP/Tdap/Td Vaccine (2 - Td or Tdap) Next due on 4/18/2029 04/18/2019  Imm Admin: Tdap Vaccine    12/10/2008  Imm Admin: TD Vaccine              Pneumococcal Vaccine: 65+ Years (Series Information) Completed       03/09/2015  Imm Admin: Pneumococcal Conjugate Vaccine (Prevnar/PCV-13)    12/10/2008  Imm Admin: Pneumococcal polysaccharide vaccine (PPSV-23)              Zoster (Shingles) Vaccines (Series Information) Completed      11/09/2021  Imm Admin: Zoster Vaccine Recombinant (RZV) (SHINGRIX)    06/10/2021  Imm Admin: Zoster Vaccine Recombinant (RZV) (SHINGRIX)    04/06/2012  Imm Admin: Zoster Vaccine Live (ZVL) (Zostavax) - HISTORICAL DATA              Influenza Vaccine (Series Information) Completed      09/03/2023  Imm Admin: Influenza Vaccine Adult HD    10/10/2022  Imm Admin: Influenza Vaccine Adult HD    11/09/2021  Imm Admin: Influenza Vaccine Adult HD    09/26/2020  Imm Admin: Influenza Vaccine Adult HD    12/18/2018  Imm Admin: Influenza Vaccine Quad Inj (Pf)    Only the first 5 history entries have been loaded, but more history exists.              Hepatitis A Vaccine (Hep A) (Series Information) Aged Out      No completion history exists for this topic.              HPV Vaccines (Series Information) Aged Out      No completion history exists for this topic.              Polio Vaccine (Inactivated Polio) (Series Information) Aged Out      No completion history exists for this topic.              Meningococcal Immunization (Series Information) Aged Out      No completion history exists for this topic.              Discontinued - Mammogram  Discontinued        Frequency changed to Never automatically (Topic No Longer Applies)    02/15/2022  MA-SCREENING MAMMO BILAT W/TOMOSYNTHESIS W/CAD    11/09/2020  MA-SCREENING MAMMO BILAT W/TOMOSYNTHESIS W/CAD    11/06/2019  MA-SCREENING MAMMO BILAT W/TOMOSYNTHESIS W/CAD    10/17/2018  MA-SCREENING MAMMO BILAT W/TOMOSYNTHESIS W/CAD    Only the first 5 history entries have been loaded, but more history exists.              Discontinued - Cervical Cancer Screening  Discontinued        Frequency changed to Never automatically (Topic No Longer Applies)    01/08/2021  Done          "     Discontinued - Colorectal Cancer Screening  Discontinued        Frequency changed to Never automatically (Topic No Longer Applies)    01/08/2021  Colonoscopy (Done)                    Patient Care Team:  Renny Desouza M.D. as PCP - General (Internal Medicine)  Renny Desouza M.D. as PCP - [facility] (Internal Medicine)      Financial Resource Strain: Low Risk  (6/11/2024)    Overall Financial Resource Strain (CARDIA)     Difficulty of Paying Living Expenses: Not hard at all      Transportation Needs: No Transportation Needs (6/11/2024)    PRAPARE - Transportation     Lack of Transportation (Medical): No     Lack of Transportation (Non-Medical): No      Food Insecurity: No Food Insecurity (6/11/2024)    Hunger Vital Sign     Worried About Running Out of Food in the Last Year: Never true     Ran Out of Food in the Last Year: Never true        Encounter Vitals  Blood Pressure : 102/50  Weight: 64 kg (141 lb)  Height: 154.9 cm (5' 1\")  BMI (Calculated): 26.64  Pain Score: No pain     Alert, oriented in no acute distress.  Eye contact is good, speech goal directed, affect calm.    Assessment and Plan. The following treatment and monitoring plan is recommended:    Atherosclerosis of aorta (HCC)  Chronic, stable. Found on prior imaging in 2023. Patient currently takes atorvastatin 40 mg daily. Follow up with PCP for continued monitoring.    Hyperlipidemia associated with type 2 diabetes mellitus (HCC)  Chronic, stable. Last lipid panel in March 2024 was WNL. She currently takes atorvastatin 40 mg daily. We discussed her dietary/lifestyle regimen. Follow up with PCP for continued monitoring and management.    Hypertension associated with diabetes (HCC)  Chronic, Stable. BP in office today is 102/50. She does not check her BP at home. She currently takes amlodipine 5 mg daily, hctz 12.5 mg daily, and spironolactone 25 mg daily. Follow up with PCP for continued monitoring and management.    Rheumatoid arthritis involving " multiple joints (HCC)  Chronic, stable. Primarily in her hands. She takes methotrexate 10 mg weekly and folic acid 1 mg daily. This medication does place her at an immunosuppressed status. She denies recurrent infections but has a spot on her L hand that has been consistently bruised/bleeding. She follows with rheumatology.    Type 2 diabetes mellitus with circulatory disorder, without long-term current use of insulin (HCC)  Microalbuminuria due to type 2 diabetes mellitus (HCC)  Chronic, stable. Last A1c in March 2024 was 6.7. She does check her blood sugar at home. Currently takes metformin 1000 mg BID & Actos 15 mg daily. Last retinal screening was April 2024 and showed no DR. Last microalbumin creat ratio in Dec 2023 was 31. Her PCP does her feet exams. She is on a statin. We discussed diet and lifestyle habits. Follow up with PCP for continued monitoring and management.    Uses roller walker  Chronic status. She uses this to help her ambulate. Uses a cane to help more than the walker.     Lumbar radiculopathy  Chronic, stable. She uses lidocaine ointment and at home PT stretches. She does not want to surgery. Follow up as previously scheduled.     Services suggested: No services needed at this time  Health Care Screening: Age-appropriate preventive services recommended by USPTF and ACIP covered by Medicare were discussed today. Services ordered if indicated and agreed upon by the patient.  Referrals offered: Community-based lifestyle interventions to reduce health risks and promote self-management and wellness, fall prevention, nutrition, physical activity, tobacco-use cessation, weight loss, and mental health services as per orders if indicated.    Discussion today about general wellness and lifestyle habits:    Prevent falls and reduce trip hazards; Cautioned about securing or removing rugs.  Have a working fire alarm and carbon monoxide detector.  Engage in regular physical activity and social  activities.    Follow-up: Return for appointment with Primary Care Provider as needed..

## 2024-06-11 NOTE — TELEPHONE ENCOUNTER
Received request via: Patient    Was the patient seen in the last year in this department? Yes    Does the patient have an active prescription (recently filled or refills available) for medication(s) requested? No    Pharmacy Name: [facility]    Does the patient have correction Plus and need 100 day supply (blood pressure, diabetes and cholesterol meds only)? Yes, quantity updated to 100 days

## 2024-06-11 NOTE — ASSESSMENT & PLAN NOTE
Chronic, stable. She uses lidocaine ointment and at home PT stretches. She does not want to surgery. Follow up as previously scheduled.

## 2024-07-11 ENCOUNTER — HOSPITAL ENCOUNTER (OUTPATIENT)
Dept: LAB | Facility: MEDICAL CENTER | Age: 84
End: 2024-07-11
Attending: INTERNAL MEDICINE
Payer: MEDICARE

## 2024-07-11 LAB
ALT SERPL-CCNC: 19 U/L (ref 2–50)
AST SERPL-CCNC: 25 U/L (ref 12–45)
BASOPHILS # BLD AUTO: 0.3 % (ref 0–1.8)
BASOPHILS # BLD: 0.02 K/UL (ref 0–0.12)
CREAT SERPL-MCNC: 1.01 MG/DL (ref 0.5–1.4)
EOSINOPHIL # BLD AUTO: 0.08 K/UL (ref 0–0.51)
EOSINOPHIL NFR BLD: 1.2 % (ref 0–6.9)
ERYTHROCYTE [DISTWIDTH] IN BLOOD BY AUTOMATED COUNT: 51.7 FL (ref 35.9–50)
ERYTHROCYTE [SEDIMENTATION RATE] IN BLOOD BY WESTERGREN METHOD: 58 MM/HOUR (ref 0–25)
GFR SERPLBLD CREATININE-BSD FMLA CKD-EPI: 56 ML/MIN/1.73 M 2
HCT VFR BLD AUTO: 40.8 % (ref 37–47)
HGB BLD-MCNC: 13.4 G/DL (ref 12–16)
IMM GRANULOCYTES # BLD AUTO: 0.01 K/UL (ref 0–0.11)
IMM GRANULOCYTES NFR BLD AUTO: 0.1 % (ref 0–0.9)
LYMPHOCYTES # BLD AUTO: 1.3 K/UL (ref 1–4.8)
LYMPHOCYTES NFR BLD: 19.1 % (ref 22–41)
MCH RBC QN AUTO: 34.1 PG (ref 27–33)
MCHC RBC AUTO-ENTMCNC: 32.8 G/DL (ref 32.2–35.5)
MCV RBC AUTO: 103.8 FL (ref 81.4–97.8)
MONOCYTES # BLD AUTO: 0.73 K/UL (ref 0–0.85)
MONOCYTES NFR BLD AUTO: 10.7 % (ref 0–13.4)
NEUTROPHILS # BLD AUTO: 4.67 K/UL (ref 1.82–7.42)
NEUTROPHILS NFR BLD: 68.6 % (ref 44–72)
NRBC # BLD AUTO: 0 K/UL
NRBC BLD-RTO: 0 /100 WBC (ref 0–0.2)
PLATELET # BLD AUTO: 285 K/UL (ref 164–446)
PMV BLD AUTO: 8.9 FL (ref 9–12.9)
RBC # BLD AUTO: 3.93 M/UL (ref 4.2–5.4)
WBC # BLD AUTO: 6.8 K/UL (ref 4.8–10.8)

## 2024-07-11 PROCEDURE — 36415 COLL VENOUS BLD VENIPUNCTURE: CPT

## 2024-07-11 PROCEDURE — 84450 TRANSFERASE (AST) (SGOT): CPT

## 2024-07-11 PROCEDURE — 85652 RBC SED RATE AUTOMATED: CPT

## 2024-07-11 PROCEDURE — 84460 ALANINE AMINO (ALT) (SGPT): CPT

## 2024-07-11 PROCEDURE — 82565 ASSAY OF CREATININE: CPT

## 2024-07-11 PROCEDURE — 85025 COMPLETE CBC W/AUTO DIFF WBC: CPT

## 2024-07-26 SDOH — ECONOMIC STABILITY: TRANSPORTATION INSECURITY
IN THE PAST 12 MONTHS, HAS LACK OF RELIABLE TRANSPORTATION KEPT YOU FROM MEDICAL APPOINTMENTS, MEETINGS, WORK OR FROM GETTING THINGS NEEDED FOR DAILY LIVING?: NO

## 2024-07-26 SDOH — ECONOMIC STABILITY: HOUSING INSECURITY
IN THE LAST 12 MONTHS, WAS THERE A TIME WHEN YOU DID NOT HAVE A STEADY PLACE TO SLEEP OR SLEPT IN A SHELTER (INCLUDING NOW)?: PATIENT DECLINED

## 2024-07-26 SDOH — ECONOMIC STABILITY: FOOD INSECURITY: WITHIN THE PAST 12 MONTHS, THE FOOD YOU BOUGHT JUST DIDN'T LAST AND YOU DIDN'T HAVE MONEY TO GET MORE.: NEVER TRUE

## 2024-07-26 SDOH — ECONOMIC STABILITY: HOUSING INSECURITY

## 2024-07-26 SDOH — ECONOMIC STABILITY: FOOD INSECURITY: WITHIN THE PAST 12 MONTHS, YOU WORRIED THAT YOUR FOOD WOULD RUN OUT BEFORE YOU GOT MONEY TO BUY MORE.: NEVER TRUE

## 2024-07-26 SDOH — ECONOMIC STABILITY: INCOME INSECURITY: IN THE LAST 12 MONTHS, WAS THERE A TIME WHEN YOU WERE NOT ABLE TO PAY THE MORTGAGE OR RENT ON TIME?: NO

## 2024-07-26 SDOH — HEALTH STABILITY: PHYSICAL HEALTH: ON AVERAGE, HOW MANY MINUTES DO YOU ENGAGE IN EXERCISE AT THIS LEVEL?: 20 MIN

## 2024-07-26 SDOH — HEALTH STABILITY: PHYSICAL HEALTH

## 2024-07-26 SDOH — HEALTH STABILITY: MENTAL HEALTH
STRESS IS WHEN SOMEONE FEELS TENSE, NERVOUS, ANXIOUS, OR CAN'T SLEEP AT NIGHT BECAUSE THEIR MIND IS TROUBLED. HOW STRESSED ARE YOU?: ONLY A LITTLE

## 2024-07-26 SDOH — ECONOMIC STABILITY: INCOME INSECURITY: HOW HARD IS IT FOR YOU TO PAY FOR THE VERY BASICS LIKE FOOD, HOUSING, MEDICAL CARE, AND HEATING?: NOT HARD AT ALL

## 2024-07-26 ASSESSMENT — SOCIAL DETERMINANTS OF HEALTH (SDOH)
HOW OFTEN DO YOU HAVE SIX OR MORE DRINKS ON ONE OCCASION: NEVER
HOW OFTEN DO YOU ATTENT MEETINGS OF THE CLUB OR ORGANIZATION YOU BELONG TO?: PATIENT DECLINED
HOW OFTEN DO YOU ATTEND CHURCH OR RELIGIOUS SERVICES?: PATIENT DECLINED
HOW OFTEN DO YOU ATTENT MEETINGS OF THE CLUB OR ORGANIZATION YOU BELONG TO?: PATIENT DECLINED
HOW OFTEN DO YOU ATTEND CHURCH OR RELIGIOUS SERVICES?: PATIENT DECLINED
WITHIN THE PAST 12 MONTHS, YOU WORRIED THAT YOUR FOOD WOULD RUN OUT BEFORE YOU GOT THE MONEY TO BUY MORE: NEVER TRUE
HOW HARD IS IT FOR YOU TO PAY FOR THE VERY BASICS LIKE FOOD, HOUSING, MEDICAL CARE, AND HEATING?: NOT HARD AT ALL
HOW MANY DRINKS CONTAINING ALCOHOL DO YOU HAVE ON A TYPICAL DAY WHEN YOU ARE DRINKING: PATIENT DOES NOT DRINK
HOW OFTEN DO YOU HAVE A DRINK CONTAINING ALCOHOL: NEVER
DO YOU BELONG TO ANY CLUBS OR ORGANIZATIONS SUCH AS CHURCH GROUPS UNIONS, FRATERNAL OR ATHLETIC GROUPS, OR SCHOOL GROUPS?: PATIENT DECLINED
IN A TYPICAL WEEK, HOW MANY TIMES DO YOU TALK ON THE PHONE WITH FAMILY, FRIENDS, OR NEIGHBORS?: MORE THAN THREE TIMES A WEEK
HOW OFTEN DO YOU GET TOGETHER WITH FRIENDS OR RELATIVES?: MORE THAN THREE TIMES A WEEK
IN THE PAST 12 MONTHS, HAS THE ELECTRIC, GAS, OIL, OR WATER COMPANY THREATENED TO SHUT OFF SERVICE IN YOUR HOME?: NO
DO YOU BELONG TO ANY CLUBS OR ORGANIZATIONS SUCH AS CHURCH GROUPS UNIONS, FRATERNAL OR ATHLETIC GROUPS, OR SCHOOL GROUPS?: PATIENT DECLINED
HOW OFTEN DO YOU GET TOGETHER WITH FRIENDS OR RELATIVES?: MORE THAN THREE TIMES A WEEK
IN A TYPICAL WEEK, HOW MANY TIMES DO YOU TALK ON THE PHONE WITH FAMILY, FRIENDS, OR NEIGHBORS?: MORE THAN THREE TIMES A WEEK

## 2024-07-26 ASSESSMENT — LIFESTYLE VARIABLES
SKIP TO QUESTIONS 9-10: 1
HOW OFTEN DO YOU HAVE A DRINK CONTAINING ALCOHOL: NEVER
AUDIT-C TOTAL SCORE: 0
HOW MANY STANDARD DRINKS CONTAINING ALCOHOL DO YOU HAVE ON A TYPICAL DAY: PATIENT DOES NOT DRINK
HOW OFTEN DO YOU HAVE SIX OR MORE DRINKS ON ONE OCCASION: NEVER

## 2024-07-29 ENCOUNTER — OFFICE VISIT (OUTPATIENT)
Dept: MEDICAL GROUP | Facility: MEDICAL CENTER | Age: 84
End: 2024-07-29
Payer: MEDICARE

## 2024-07-29 VITALS
RESPIRATION RATE: 16 BRPM | TEMPERATURE: 97.3 F | WEIGHT: 138.8 LBS | BODY MASS INDEX: 26.21 KG/M2 | OXYGEN SATURATION: 94 % | DIASTOLIC BLOOD PRESSURE: 50 MMHG | SYSTOLIC BLOOD PRESSURE: 130 MMHG | HEIGHT: 61 IN | HEART RATE: 71 BPM

## 2024-07-29 DIAGNOSIS — Z23 IMMUNIZATION DUE: ICD-10-CM

## 2024-07-29 DIAGNOSIS — L60.0 INGROWN TOENAIL OF BOTH FEET: ICD-10-CM

## 2024-07-29 DIAGNOSIS — R80.9 MICROALBUMINURIA DUE TO TYPE 2 DIABETES MELLITUS (HCC): ICD-10-CM

## 2024-07-29 DIAGNOSIS — E11.69 HYPERLIPIDEMIA ASSOCIATED WITH TYPE 2 DIABETES MELLITUS (HCC): ICD-10-CM

## 2024-07-29 DIAGNOSIS — I15.2 HYPERTENSION ASSOCIATED WITH DIABETES (HCC): ICD-10-CM

## 2024-07-29 DIAGNOSIS — M06.9 RHEUMATOID ARTHRITIS INVOLVING MULTIPLE JOINTS (HCC): ICD-10-CM

## 2024-07-29 DIAGNOSIS — E78.5 HYPERLIPIDEMIA ASSOCIATED WITH TYPE 2 DIABETES MELLITUS (HCC): ICD-10-CM

## 2024-07-29 DIAGNOSIS — E11.59 TYPE 2 DIABETES MELLITUS WITH OTHER CIRCULATORY COMPLICATION, WITHOUT LONG-TERM CURRENT USE OF INSULIN (HCC): ICD-10-CM

## 2024-07-29 DIAGNOSIS — E11.59 HYPERTENSION ASSOCIATED WITH DIABETES (HCC): ICD-10-CM

## 2024-07-29 DIAGNOSIS — M54.16 LUMBAR RADICULOPATHY: ICD-10-CM

## 2024-07-29 DIAGNOSIS — Z00.00 ENCOUNTER FOR MEDICAL EXAMINATION TO ESTABLISH CARE: ICD-10-CM

## 2024-07-29 DIAGNOSIS — E11.29 MICROALBUMINURIA DUE TO TYPE 2 DIABETES MELLITUS (HCC): ICD-10-CM

## 2024-07-29 DIAGNOSIS — D84.9 IMMUNOSUPPRESSED STATUS (HCC): ICD-10-CM

## 2024-07-29 PROBLEM — K91.89 POST-ERCP ACUTE PANCREATITIS: Status: RESOLVED | Noted: 2023-01-17 | Resolved: 2024-07-29

## 2024-07-29 PROBLEM — R10.12 LUQ PAIN: Status: RESOLVED | Noted: 2023-07-31 | Resolved: 2024-07-29

## 2024-07-29 PROBLEM — K85.90 POST-ERCP ACUTE PANCREATITIS: Status: RESOLVED | Noted: 2023-01-17 | Resolved: 2024-07-29

## 2024-07-29 RX ORDER — LIDOCAINE 4 G/G
1 PATCH TOPICAL EVERY 24 HOURS
Qty: 30 PATCH | Refills: 0 | Status: SHIPPED | OUTPATIENT
Start: 2024-07-29

## 2024-07-29 ASSESSMENT — FIBROSIS 4 INDEX: FIB4 SCORE: 1.65

## 2024-08-01 ENCOUNTER — HOME HEALTH ADMISSION (OUTPATIENT)
Dept: HOME HEALTH SERVICES | Facility: HOME HEALTHCARE | Age: 84
End: 2024-08-01
Payer: MEDICARE

## 2024-08-06 ENCOUNTER — HOME CARE VISIT (OUTPATIENT)
Dept: HOME HEALTH SERVICES | Facility: HOME HEALTHCARE | Age: 84
End: 2024-08-06
Payer: MEDICARE

## 2024-08-06 VITALS
HEIGHT: 61 IN | DIASTOLIC BLOOD PRESSURE: 86 MMHG | TEMPERATURE: 98.1 F | WEIGHT: 136 LBS | OXYGEN SATURATION: 94 % | HEART RATE: 64 BPM | SYSTOLIC BLOOD PRESSURE: 124 MMHG | RESPIRATION RATE: 16 BRPM | BODY MASS INDEX: 25.68 KG/M2

## 2024-08-06 PROCEDURE — 665005 NO-PAY RAP - HOME HEALTH

## 2024-08-06 PROCEDURE — 665001 SOC-HOME HEALTH

## 2024-08-06 PROCEDURE — G0495 RN CARE TRAIN/EDU IN HH: HCPCS

## 2024-08-06 PROCEDURE — 665998 HH PPS REVENUE CREDIT

## 2024-08-06 PROCEDURE — 665999 HH PPS REVENUE DEBIT

## 2024-08-06 SDOH — ECONOMIC STABILITY: HOUSING INSECURITY: HOME SAFETY: SEE CAREPLAN FOR PRECAUTIONS

## 2024-08-06 ASSESSMENT — ENCOUNTER SYMPTOMS
PAIN LOCATION: LOW BACK
PAIN: 1
PAIN LOCATION - PAIN QUALITY: ACHE/THROB
PAIN LOCATION - PAIN SEVERITY: 5/10
DEBILITATING PAIN: 1
STOOL FREQUENCY: DAILY
SUBJECTIVE PAIN PROGRESSION: WAXING AND WANING
PERSON REPORTING PAIN: PATIENT
HIGHEST PAIN SEVERITY IN PAST 24 HOURS: 8/10
LOWEST PAIN SEVERITY IN PAST 24 HOURS: 5/10
PAIN LOCATION - PAIN DURATION: A WHILE
PAIN SEVERITY GOAL: 5/10
PAIN LOCATION - PAIN FREQUENCY: CONSTANT
BOWEL PATTERN NORMAL: 1

## 2024-08-06 ASSESSMENT — ACTIVITIES OF DAILY LIVING (ADL): OASIS_M1830: 05

## 2024-08-06 ASSESSMENT — FIBROSIS 4 INDEX: FIB4 SCORE: 1.65

## 2024-08-06 NOTE — Clinical Note
Primary Diagnosis: Bilateral gluteal pain  Skilled Need: Assessment and Instruction  Zip Code: [address]   Frequency: 1x1 only per patient request, 3PRN medication changes needing medication reconciliation and teaching, or patient request for disease management, medication education  Disciplines ordered: PT only  Insurance and Authorization: [employer]  Certification Period: 8/6/24-10/4/24  Special Considerations: Patient has DM and HTN and large medication list, would benefit from nursing education for disease management education and medication teaching however patient declined further nursing visits.

## 2024-08-07 ENCOUNTER — HOME CARE VISIT (OUTPATIENT)
Dept: HOME HEALTH SERVICES | Facility: HOME HEALTHCARE | Age: 84
End: 2024-08-07
Payer: MEDICARE

## 2024-08-07 DIAGNOSIS — I10 PRIMARY HYPERTENSION: ICD-10-CM

## 2024-08-07 PROCEDURE — 665999 HH PPS REVENUE DEBIT

## 2024-08-07 PROCEDURE — 665998 HH PPS REVENUE CREDIT

## 2024-08-07 NOTE — CASE COMMUNICATION
noted  ----- Message -----  From: Zabrina Tripathi R.N.  Sent: 8/6/2024   2:41 PM PDT  To: Leelee Lowery R.N.; Suha Abreu R.N.; *      Primary Diagnosis: Bilateral gluteal pain  Skilled Need: Assessment and Instruction  Zip Code: [address]  SN Frequency: 1x1 only per patient request, 3PRN medication changes needing medication reconciliation and teaching, or patient request for disease management, medication education  Disciplines orde red: PT only  Insurance and Authorization: [facility]  Certification Period: 8/6/24-10/4/24  Special Considerations: Patient has DM and HTN and large medication list, would benefit from nursing education for disease management education and medication teaching however patient declined further nursing visits.

## 2024-08-08 ENCOUNTER — DOCUMENTATION (OUTPATIENT)
Dept: MEDICAL GROUP | Facility: PHYSICIAN GROUP | Age: 84
End: 2024-08-08
Payer: MEDICARE

## 2024-08-08 DIAGNOSIS — I10 PRIMARY HYPERTENSION: ICD-10-CM

## 2024-08-08 PROCEDURE — 665999 HH PPS REVENUE DEBIT

## 2024-08-08 PROCEDURE — 665998 HH PPS REVENUE CREDIT

## 2024-08-08 RX ORDER — HYDROCHLOROTHIAZIDE 12.5 MG/1
12.5 CAPSULE ORAL DAILY
Qty: 100 CAPSULE | Refills: 3 | Status: SHIPPED | OUTPATIENT
Start: 2024-08-08

## 2024-08-08 RX ORDER — SPIRONOLACTONE 25 MG/1
25 TABLET ORAL DAILY
Qty: 100 TABLET | Refills: 3 | Status: SHIPPED | OUTPATIENT
Start: 2024-08-08

## 2024-08-08 NOTE — TELEPHONE ENCOUNTER
Received request via: Pharmacy    Was the patient seen in the last year in this department? Yes    Does the patient have an active prescription (recently filled or refills available) for medication(s) requested? No    Pharmacy Name: [facility]     Does the patient have FPC Plus and need 100-day supply? (This applies to ALL medications) Yes, quantity updated to 100 days

## 2024-08-08 NOTE — TELEPHONE ENCOUNTER
Requested Prescriptions     Pending Prescriptions Disp Refills    hydrochlorothiazide (MICROZIDE) 12.5 MG capsule [Pharmacy Med Name: hydroCHLOROthiazide 12.5 MG Oral Capsule] 100 Capsule 0     Sig: Take 1 capsule by mouth once daily      Last office visit: 7/29/24  Last lab: 7/11/24

## 2024-08-08 NOTE — PROGRESS NOTES
Medication chart review for [facility] services    Received referral from [facility].   Medications reviewed  compared with discharge summary if available.  Discharge summary date, if applicable:   Not applicable    Current medication list per [facility]     Medication list one, patient is currently taking    Current Outpatient Medications:     spironolactone, 25 mg, Oral, DAILY    Povidone (PF), 1 Drop, Both Eyes, QDAY PRN    acetaminophen, 1,000 mg, Oral, BID PRN    gabapentin, 100 mg, Oral, QHS    hydrocortisone, 1 Application, Topical, BID    lidocaine, 1 Patch, Transdermal, Q24HRS (Patient not taking: Reported on 8/6/2024)    metformin, 1,000 mg, Oral, BID WITH MEALS    losartan, 100 mg, Oral, DAILY    pioglitazone, 15 mg, Oral, DAILY    hydrochlorothiazide, 12.5 mg, Oral, DAILY    amLODIPine, 5 mg, Oral, DAILY    atorvastatin, 40 mg, Oral, Q EVENING    diclofenac sodium, APPLY 2 TO 3 GRAMS ON THE SKIN TO ANY AREA YOU HAVE PAIN THREE TIMES TO FOUR TIMES DAILY AS NEEDED, per patient apply with lidocaine ointment    lidocaine, APPLY 5 GRAMS OR 6 INCH STRIP WITH SWAB TO PAINFUL AREAS UP TO 4 TIMES DAILY, per patient, apply with diclofenac gel    CALCIUM PO, 1 Tablet, Oral, BID    Cholecalciferol (D3 PO), 50 mcg, Oral, Q EVENING    Ferrous Sulfate (IRON PO), 65 mg, Oral, DAILY    glucose blood, 1 Each, Other, DAILY    dorzolamide-timolol, 1 Drop, Both Eyes, BID    methotrexate, 10 mg, Oral, Q7 DAYS    folic acid, 1 mg, Oral, DAILY    multivitamin, 1 Tablet, Oral, DAILY    omeprazole, 20 mg, Oral, QAM      Medication list two, drugs that the patient has been prescribed or recommended to take by their healthcare provider on discharge summary  Not applicable    No Known Allergies    Labs     Lab Results   Component Value Date/Time    SODIUM 140 03/18/2024 10:00 AM    POTASSIUM 4.4 03/18/2024 10:00 AM    CHLORIDE 105 03/18/2024 10:00 AM    CO2 24 03/18/2024 10:00 AM    GLUCOSE 138 (H) 03/18/2024 10:00 AM     BUN 33 (H) 03/18/2024 10:00 AM    CREATININE 1.01 07/11/2024 10:24 AM     Lab Results   Component Value Date/Time    ALKPHOSPHAT 90 12/19/2023 10:12 AM    ASTSGOT 25 07/11/2024 10:24 AM    ALTSGPT 19 07/11/2024 10:24 AM    TBILIRUBIN 0.4 12/19/2023 10:12 AM    INR 0.98 05/01/2023 10:57 AM    ALBUMIN 4.3 12/19/2023 10:12 AM        Assessment for clinically significant drug interactions, drug omissions/additions, duplicative therapies.            CC   Kaia Arvizu M.D.  [address]  [place] [address]  Fax: [phone]    [facility]  Phone [phone] fax [phone]    This note was created using voice recognition software (Dragon). The accuracy of the dictation is limited by the abilities of the software. I have reviewed the note prior to signing, however some errors in grammar and context are still possible. If you have any questions related to this note please do not hesitate to contact our office.

## 2024-08-08 NOTE — CASE COMMUNICATION
noted  ----- Message -----  From: Leelee Martinez, PT  Sent: 8/7/2024   7:35 PM PDT  To: Leelee Lowery R.N.; Suha Abreu R.N.; *      P.T. called patient and left voicemail to schedule P.T. evaluation. Will await return call.

## 2024-08-09 PROCEDURE — 665999 HH PPS REVENUE DEBIT

## 2024-08-09 PROCEDURE — 665998 HH PPS REVENUE CREDIT

## 2024-08-10 PROCEDURE — 665998 HH PPS REVENUE CREDIT

## 2024-08-10 PROCEDURE — 665999 HH PPS REVENUE DEBIT

## 2024-08-11 PROCEDURE — 665998 HH PPS REVENUE CREDIT

## 2024-08-11 PROCEDURE — 665999 HH PPS REVENUE DEBIT

## 2024-08-12 ENCOUNTER — HOME CARE VISIT (OUTPATIENT)
Dept: HOME HEALTH SERVICES | Facility: HOME HEALTHCARE | Age: 84
End: 2024-08-12
Payer: MEDICARE

## 2024-08-12 PROCEDURE — 665998 HH PPS REVENUE CREDIT

## 2024-08-12 PROCEDURE — 665999 HH PPS REVENUE DEBIT

## 2024-08-12 PROCEDURE — G0151 HHCP-SERV OF PT,EA 15 MIN: HCPCS

## 2024-08-12 NOTE — Clinical Note
PT evaluation completed, requesting follow up visits with frequency of 2w3 effective 08/12/2024.  Patient was having more issue with her right jaw-neck pain following two dental surgeries but still having same pain 8/10.  Phone call placed to PCP on evaluation day as per patient's request to ask for medical recommendation on how to address this pain.

## 2024-08-13 ENCOUNTER — OFFICE VISIT (OUTPATIENT)
Dept: MEDICAL GROUP | Facility: MEDICAL CENTER | Age: 84
End: 2024-08-13
Payer: MEDICARE

## 2024-08-13 VITALS
OXYGEN SATURATION: 92 % | TEMPERATURE: 97.8 F | HEART RATE: 82 BPM | SYSTOLIC BLOOD PRESSURE: 130 MMHG | DIASTOLIC BLOOD PRESSURE: 70 MMHG | RESPIRATION RATE: 16 BRPM

## 2024-08-13 VITALS
DIASTOLIC BLOOD PRESSURE: 60 MMHG | HEART RATE: 80 BPM | WEIGHT: 135 LBS | OXYGEN SATURATION: 90 % | TEMPERATURE: 97.2 F | HEIGHT: 61 IN | BODY MASS INDEX: 25.49 KG/M2 | SYSTOLIC BLOOD PRESSURE: 122 MMHG | RESPIRATION RATE: 16 BRPM

## 2024-08-13 DIAGNOSIS — R68.84 JAW PAIN: ICD-10-CM

## 2024-08-13 DIAGNOSIS — E11.59 TYPE 2 DIABETES MELLITUS WITH OTHER CIRCULATORY COMPLICATION, WITHOUT LONG-TERM CURRENT USE OF INSULIN (HCC): ICD-10-CM

## 2024-08-13 PROCEDURE — 3078F DIAST BP <80 MM HG: CPT | Performed by: STUDENT IN AN ORGANIZED HEALTH CARE EDUCATION/TRAINING PROGRAM

## 2024-08-13 PROCEDURE — 665999 HH PPS REVENUE DEBIT

## 2024-08-13 PROCEDURE — 3074F SYST BP LT 130 MM HG: CPT | Performed by: STUDENT IN AN ORGANIZED HEALTH CARE EDUCATION/TRAINING PROGRAM

## 2024-08-13 PROCEDURE — 99214 OFFICE O/P EST MOD 30 MIN: CPT | Performed by: STUDENT IN AN ORGANIZED HEALTH CARE EDUCATION/TRAINING PROGRAM

## 2024-08-13 PROCEDURE — 665998 HH PPS REVENUE CREDIT

## 2024-08-13 PROCEDURE — 1125F AMNT PAIN NOTED PAIN PRSNT: CPT | Performed by: STUDENT IN AN ORGANIZED HEALTH CARE EDUCATION/TRAINING PROGRAM

## 2024-08-13 ASSESSMENT — PATIENT HEALTH QUESTIONNAIRE - PHQ9: CLINICAL INTERPRETATION OF PHQ2 SCORE: 0

## 2024-08-13 ASSESSMENT — FIBROSIS 4 INDEX: FIB4 SCORE: 1.65

## 2024-08-13 ASSESSMENT — ENCOUNTER SYMPTOMS
LOWEST PAIN SEVERITY IN PAST 24 HOURS: 5/10
ARTHRALGIAS: 1
MUSCLE WEAKNESS: 1
PAIN LOCATION - PAIN QUALITY: DULL ACHE
PAIN: 1
HIGHEST PAIN SEVERITY IN PAST 24 HOURS: 8/10
PERSON REPORTING PAIN: PATIENT
PAIN LOCATION - PAIN FREQUENCY: CONSTANT
PAIN LOCATION - PAIN SEVERITY: 8/10
PAIN SEVERITY GOAL: 2/10
PAIN LOCATION - PAIN QUALITY: ACHE
PAIN LOCATION: BACK
PAIN LOCATION - PAIN SEVERITY: 5/10
PAIN LOCATION - PAIN FREQUENCY: CONSTANT

## 2024-08-13 ASSESSMENT — ACTIVITIES OF DAILY LIVING (ADL): AMBULATION ASSISTANCE ON FLAT SURFACES: 1

## 2024-08-13 ASSESSMENT — PAIN SCALES - GENERAL: PAINLEVEL: 8=MODERATE-SEVERE PAIN

## 2024-08-13 NOTE — CASE COMMUNICATION
noted  ----- Message -----  From: Dajuan Leo, PT  Sent: 8/13/2024   8:31 AM PDT  To: Leelee Lowery R.N.; Jeff Sarkar; Candice Hu      PT evaluation completed, requesting follow up visits with frequency of 2w3 effective 08/12/2024.  Patient was having more issue with her right jaw-neck pain following two dental surgeries but still having same pain 8/10.  Phone call placed to PCP on evaluation day as per patient's request to ask f or medical recommendation on how to address this pain.

## 2024-08-13 NOTE — PROGRESS NOTES
"Subjective:     CC: Jaw pain    HPI:   Jewell presents today with a week of jaw pain.  She states that the pain started in the anterior neck and has traveled up to the jaw.  It is now bilateral in the jaw and traveling up towards her ears.  She cannot really describe the pain.  She says it is not burning.  She says it is just they are all the time.  She did have a dental issue in July so thought this might be related.  She called her dentist to send her to an oral surgeon.  She has had a full set of dental x-rays and they stated that there is nothing wrong with any of the teeth and no infection in the jaw itself.  Since then the pain has worsened.    ROS:  ROS    Objective:     Exam:  /60   Pulse 80   Temp 36.2 °C (97.2 °F) (Temporal)   Resp 16   Ht 1.549 m (5' 1\")   Wt 61.2 kg (135 lb)   SpO2 90%   BMI 25.51 kg/m²  Body mass index is 25.51 kg/m².    Physical Exam  Vitals reviewed.   Constitutional:       General: She is not in acute distress.     Appearance: She is not toxic-appearing.   HENT:      Head: Normocephalic and atraumatic.      Right Ear: External ear normal.      Left Ear: External ear normal.      Nose: Nose normal. No congestion.      Mouth/Throat:      Mouth: Mucous membranes are moist.      Pharynx: Oropharynx is clear. No oropharyngeal exudate or posterior oropharyngeal erythema.   Eyes:      General:         Right eye: No discharge.         Left eye: No discharge.      Extraocular Movements: Extraocular movements intact.      Conjunctiva/sclera: Conjunctivae normal.   Cardiovascular:      Rate and Rhythm: Normal rate and regular rhythm.      Heart sounds: Normal heart sounds. No murmur heard.  Pulmonary:      Effort: Pulmonary effort is normal. No respiratory distress.   Musculoskeletal:      Cervical back: Normal range of motion. No rigidity or tenderness.   Lymphadenopathy:      Cervical: No cervical adenopathy.   Skin:     General: Skin is warm and dry.   Neurological:      Mental " Status: She is alert.   Psychiatric:         Mood and Affect: Mood normal.         Behavior: Behavior normal.         Thought Content: Thought content normal.         Judgment: Judgment normal.       EKG Interpretation   Ordered and interpreted by Kaia Arvizu MD  Rhythm: normal sinus   Rate: normal   Axis: normal   Ectopy: none   Conduction: normal   ST Segments: no acute change   T Waves: no acute change   Q Waves: none   Clinical Impression: no acute changes and normal EKG      Assessment & Plan:     84 y.o. female with the following -     1. Jaw pain  EKG was normal.  I was concerned given her persistent neck and jaw pain and history of diabetes that this could be an atypical presentation of ACS.  Fortunately EKG was WNL.  We discussed getting advanced imaging such as a CT scan of the jaw and neck versus a trial of antibiotics.  She would like to try it and trial of antibiotics first to see if there is a throat infection that can be cleared.  - EKG - Clinic Performed  - amoxicillin-clavulanate (AUGMENTIN) 875-125 MG Tab; Take 1 Tablet by mouth 2 times a day.  Dispense: 14 Tablet; Refill: 0    2. Type 2 diabetes mellitus with other circulatory complication, without long-term current use of insulin ([facility])  - metformin (GLUCOPHAGE) 1000 MG tablet; Take 1 Tablet by mouth 2 times a day with meals. Indications: Type 2 Diabetes  Dispense: 200 Tablet; Refill: 3        No follow-ups on file.    Please note that this dictation was created using voice recognition software. I have made every reasonable attempt to correct obvious errors, but I expect that there are errors of grammar and possibly content that I did not discover before finalizing the note.

## 2024-08-14 ENCOUNTER — HOME CARE VISIT (OUTPATIENT)
Dept: HOME HEALTH SERVICES | Facility: HOME HEALTHCARE | Age: 84
End: 2024-08-14
Payer: MEDICARE

## 2024-08-14 PROCEDURE — 665999 HH PPS REVENUE DEBIT

## 2024-08-14 PROCEDURE — 665998 HH PPS REVENUE CREDIT

## 2024-08-15 PROCEDURE — 665999 HH PPS REVENUE DEBIT

## 2024-08-15 PROCEDURE — 665998 HH PPS REVENUE CREDIT

## 2024-08-16 PROCEDURE — 665998 HH PPS REVENUE CREDIT

## 2024-08-16 PROCEDURE — 665999 HH PPS REVENUE DEBIT

## 2024-08-17 PROCEDURE — 665999 HH PPS REVENUE DEBIT

## 2024-08-17 PROCEDURE — 665998 HH PPS REVENUE CREDIT

## 2024-08-18 PROCEDURE — 665999 HH PPS REVENUE DEBIT

## 2024-08-18 PROCEDURE — 665998 HH PPS REVENUE CREDIT

## 2024-08-19 PROCEDURE — 665998 HH PPS REVENUE CREDIT

## 2024-08-19 PROCEDURE — 665999 HH PPS REVENUE DEBIT

## 2024-08-20 ENCOUNTER — HOME CARE VISIT (OUTPATIENT)
Dept: HOME HEALTH SERVICES | Facility: HOME HEALTHCARE | Age: 84
End: 2024-08-20
Payer: MEDICARE

## 2024-08-20 PROCEDURE — 665998 HH PPS REVENUE CREDIT

## 2024-08-20 PROCEDURE — G0151 HHCP-SERV OF PT,EA 15 MIN: HCPCS

## 2024-08-20 PROCEDURE — 665999 HH PPS REVENUE DEBIT

## 2024-08-21 VITALS
SYSTOLIC BLOOD PRESSURE: 122 MMHG | HEART RATE: 72 BPM | TEMPERATURE: 97.6 F | DIASTOLIC BLOOD PRESSURE: 65 MMHG | OXYGEN SATURATION: 96 % | RESPIRATION RATE: 16 BRPM

## 2024-08-21 PROCEDURE — 665998 HH PPS REVENUE CREDIT

## 2024-08-21 PROCEDURE — 665999 HH PPS REVENUE DEBIT

## 2024-08-21 ASSESSMENT — PATIENT HEALTH QUESTIONNAIRE - PHQ9: CLINICAL INTERPRETATION OF PHQ2 SCORE: 0

## 2024-08-21 ASSESSMENT — ENCOUNTER SYMPTOMS
PERSON REPORTING PAIN: PATIENT
PAIN LOCATION - PAIN SEVERITY: 4/10
PAIN LOCATION - PAIN FREQUENCY: CONSTANT
PAIN LOCATION - PAIN QUALITY: DULL ACHE
PAIN: 1

## 2024-08-22 ENCOUNTER — HOME CARE VISIT (OUTPATIENT)
Dept: HOME HEALTH SERVICES | Facility: HOME HEALTHCARE | Age: 84
End: 2024-08-22
Payer: MEDICARE

## 2024-08-22 PROCEDURE — 665998 HH PPS REVENUE CREDIT

## 2024-08-22 PROCEDURE — G0151 HHCP-SERV OF PT,EA 15 MIN: HCPCS

## 2024-08-22 PROCEDURE — 665999 HH PPS REVENUE DEBIT

## 2024-08-23 VITALS
DIASTOLIC BLOOD PRESSURE: 75 MMHG | TEMPERATURE: 97.8 F | OXYGEN SATURATION: 97 % | RESPIRATION RATE: 18 BRPM | SYSTOLIC BLOOD PRESSURE: 125 MMHG | HEART RATE: 65 BPM

## 2024-08-23 PROCEDURE — 665999 HH PPS REVENUE DEBIT

## 2024-08-23 PROCEDURE — 665998 HH PPS REVENUE CREDIT

## 2024-08-23 ASSESSMENT — PATIENT HEALTH QUESTIONNAIRE - PHQ9: CLINICAL INTERPRETATION OF PHQ2 SCORE: 0

## 2024-08-23 ASSESSMENT — ENCOUNTER SYMPTOMS
PAIN: 1
PAIN LOCATION - PAIN QUALITY: ACHE
PERSON REPORTING PAIN: PATIENT
PAIN LOCATION - PAIN DURATION: CHRONIC
PAIN LOCATION - PAIN SEVERITY: 5/10
PAIN LOCATION - PAIN FREQUENCY: FREQUENT

## 2024-08-24 PROCEDURE — 665998 HH PPS REVENUE CREDIT

## 2024-08-24 PROCEDURE — 665999 HH PPS REVENUE DEBIT

## 2024-08-25 PROCEDURE — 665998 HH PPS REVENUE CREDIT

## 2024-08-25 PROCEDURE — 665999 HH PPS REVENUE DEBIT

## 2024-08-26 PROCEDURE — 665999 HH PPS REVENUE DEBIT

## 2024-08-26 PROCEDURE — 665998 HH PPS REVENUE CREDIT

## 2024-08-27 ENCOUNTER — HOME CARE VISIT (OUTPATIENT)
Dept: HOME HEALTH SERVICES | Facility: HOME HEALTHCARE | Age: 84
End: 2024-08-27
Payer: MEDICARE

## 2024-08-27 VITALS
HEART RATE: 69 BPM | DIASTOLIC BLOOD PRESSURE: 70 MMHG | SYSTOLIC BLOOD PRESSURE: 122 MMHG | RESPIRATION RATE: 16 BRPM | OXYGEN SATURATION: 97 % | TEMPERATURE: 98.1 F

## 2024-08-27 PROCEDURE — 665999 HH PPS REVENUE DEBIT

## 2024-08-27 PROCEDURE — 665998 HH PPS REVENUE CREDIT

## 2024-08-27 PROCEDURE — G0151 HHCP-SERV OF PT,EA 15 MIN: HCPCS

## 2024-08-27 ASSESSMENT — ENCOUNTER SYMPTOMS
PAIN LOCATION - PAIN SEVERITY: 3/10
PAIN LOCATION - PAIN FREQUENCY: FREQUENT
PERSON REPORTING PAIN: PATIENT
PAIN LOCATION: RIGHT BUTTOCK
PAIN: 1

## 2024-08-27 ASSESSMENT — PATIENT HEALTH QUESTIONNAIRE - PHQ9: CLINICAL INTERPRETATION OF PHQ2 SCORE: 0

## 2024-08-27 NOTE — Clinical Note
Agree with CDI recommendations.    Teresa    ----- Message -----  From: Eli Mccarthy R.N.  Sent: 8/27/2024   8:45 AM PDT  To: Zabrina Tripathi R.N.      Quality Review Completed for 8/6 [facility] [place] [facility] by JOSE Mccarthy RN on 8/27/2024:     Edits completed by JOSE Mccarthy RN:     1.  and  diagnosis coding updated per chart review  2.  changed to NA, injectables not found on current or historical MAR  3.  changed to 6 per therapy

## 2024-08-27 NOTE — CASE COMMUNICATION
Quality Review Completed for 8/6 [facility] by JOSE Mccarthy RN on 8/27/2024:     Edits completed by JOSE Mccarthy RN:     1.  and  diagnosis coding updated per chart review  2.  changed to NA, injectables not found on current or historical MAR  3.  changed to 6 per therapy

## 2024-08-28 PROCEDURE — 665999 HH PPS REVENUE DEBIT

## 2024-08-28 PROCEDURE — 665998 HH PPS REVENUE CREDIT

## 2024-08-29 ENCOUNTER — OFFICE VISIT (OUTPATIENT)
Dept: MEDICAL GROUP | Facility: MEDICAL CENTER | Age: 84
End: 2024-08-29
Payer: MEDICARE

## 2024-08-29 VITALS
HEIGHT: 61 IN | WEIGHT: 135.5 LBS | BODY MASS INDEX: 25.58 KG/M2 | OXYGEN SATURATION: 95 % | DIASTOLIC BLOOD PRESSURE: 82 MMHG | HEART RATE: 75 BPM | TEMPERATURE: 97.8 F | SYSTOLIC BLOOD PRESSURE: 124 MMHG | RESPIRATION RATE: 19 BRPM

## 2024-08-29 DIAGNOSIS — R68.84 JAW PAIN: ICD-10-CM

## 2024-08-29 DIAGNOSIS — K52.1 DIARRHEA DUE TO DRUG: ICD-10-CM

## 2024-08-29 PROCEDURE — 665999 HH PPS REVENUE DEBIT

## 2024-08-29 PROCEDURE — 665998 HH PPS REVENUE CREDIT

## 2024-08-29 ASSESSMENT — FIBROSIS 4 INDEX: FIB4 SCORE: 1.65

## 2024-08-29 NOTE — PROGRESS NOTES
Subjective:     History of Present Illness  The patient is an 84-year-old female seen in follow-up for jaw pain.    She reports severe jaw pain that extends to her teeth, resembling a toothache. The pain has been particularly intense for the past 2 days. Despite a 10-day course of antibiotics prescribed by Dr. Flores, the pain persists. She has consulted a dentist and undergone imaging tests, but no abnormalities were found. She has been using Tylenol to manage the pain, which provides some relief.  She has a history of chronic coughing and allergies spanning 20 to 30 years, which she believes may be exacerbating her jaw pain. She describes her cough as dry and reports no fever.    She mentions that the antibiotic caused diarrhea, which continues even after completing the course a week ago. She reports no abdominal or back pain.      She also has diabetes.    SOCIAL HISTORY  The patient does not smoke.    Current medicines (including changes today)  Current Outpatient Medications   Medication Sig Dispense Refill    amoxicillin-clavulanate (AUGMENTIN) 875-125 MG Tab Take 1 Tablet by mouth 2 times a day. 14 Tablet 0    metformin (GLUCOPHAGE) 1000 MG tablet Take 1 Tablet by mouth 2 times a day with meals. Indications: Type 2 Diabetes 200 Tablet 3    spironolactone (ALDACTONE) 25 MG Tab Take 1 tablet by mouth once daily 100 Tablet 3    hydrochlorothiazide (MICROZIDE) 12.5 MG capsule Take 1 capsule by mouth once daily 100 Capsule 3    Povidone, PF, 0.5 % Solution Administer 1 Drop into both eyes 1 time a day as needed (dry eye). Per patient report  Indications: dry eye      acetaminophen (TYLENOL) 500 MG Tab Take 1,000 mg by mouth 2 times a day as needed (severe pain).      gabapentin (NEURONTIN) 100 MG Cap Take 100 mg by mouth at bedtime. Patient reports takes at bedtime only  Indications: pain      hydrocortisone 2.5 % Cream topical cream Apply 1 Application topically 2 times a day. 28 g 0    lidocaine (ASPERFLEX) 4  % Patch Place 1 Patch on the skin every 24 hours. 30 Patch 0    losartan (COZAAR) 100 MG Tab Take 1 Tablet by mouth every day. 100 Tablet 3    pioglitazone (ACTOS) 15 MG Tab Take 1 Tablet by mouth every day. 100 Tablet 3    amLODIPine (NORVASC) 5 MG Tab Take 1 Tablet by mouth every day. 100 Tablet 2    atorvastatin (LIPITOR) 40 MG Tab Take 1 Tablet by mouth every evening. 100 Tablet 2    diclofenac sodium (VOLTAREN) 1 % Gel APPLY 2 TO 3 GRAMS ON THE SKIN TO ANY AREA YOU HAVE PAIN THREE TIMES TO FOUR TIMES DAILY AS NEEDED, per patient apply with lidocaine ointment      lidocaine (XYLOCAINE) 5 % Ointment APPLY 5 GRAMS OR 6 INCH STRIP WITH SWAB TO PAINFUL AREAS UP TO 4 TIMES DAILY, per patient, apply with diclofenac gel      CALCIUM PO Take 1 Tablet by mouth 2 times a day. Calcium citrate,magnesium, zinc and Vitamin D3      Cholecalciferol (D3 PO) Take 50 mcg by mouth every evening. Indications: supplement      Ferrous Sulfate (IRON PO) Take 65 mg by mouth every day. OTC  Indications: supplement      glucose blood strip 1 Strip by Other route every day. 100 Strip 3    dorzolamide-timolol (COSOPT) 22.3-6.8 MG/ML Solution Administer 1 Drop into both eyes 2 times a day. Indications: Increased Pressure Within the Eye      methotrexate 2.5 MG tablet Take 10 mg by mouth every 7 days. 4 tablets on Thursday  Indications: Rheumatoid Arthritis      folic acid (FOLVITE) 1 MG Tab Take 1 mg by mouth every day. Indications: Methotrexate Poisoning      multivitamin (THERAGRAN) TABS Take 1 Tablet by mouth every day. Indications: Nutritional Support      omeprazole (PRILOSEC) 20 MG CPDR Take 20 mg by mouth every morning. Indications: Gastroesophageal Reflux Disease       No current facility-administered medications for this visit.     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          ROS   Review of Systems   Constitutional: Negative.  Negative for fever, chills, weight loss, malaise/fatigue and diaphoresis.   HENT: Negative.  Negative for hearing loss, ear pain, nosebleeds, congestion, sore throat, neck pain, tinnitus and ear discharge.    Respiratory: Negative.  Negative for hemoptysis, sputum production, shortness of breath, wheezing and stridor.    Cardiovascular: Negative.  Negative for chest pain, palpitations, orthopnea, claudication, leg swelling and PND.   Gastrointestinal: denies nausea, vomiting, diarrhea, constipation, heartburn, melena or hematochezia.  Genitourinary: Denies dysuria, hematuria, urinary incontinence, frequency or urgency.    Musculoskeletal: Negative.  Negative for myalgias and back pain.   Neurological: Negative.  Negative for dizziness, tingling, tremors, weakness and headaches.   Psych:  Denies depression, anxiety or insomnia.  All other systems reviewed and are negative.         Objective:     /82   Pulse 75   Temp 36.6 °C (97.8 °F) (Temporal)   Resp 19   Ht 1.549 m (5' 1\")   Wt 61.5 kg (135 lb 8 oz)   SpO2 95%  Body mass index is 25.6 kg/m².   Physical Exam    Physical Exam   Vitals reviewed.  Constitutional: oriented to person, place, and time. appears well-developed and well-nourished. No distress.   Neck: No JVD present.  Cardiovascular: Normal rate, regular rhythm, normal heart sounds and intact distal pulses.  Exam reveals no gallop and no friction rub.  No murmur heard.  No carotid bruits.   Pulmonary/Chest: Effort normal and breath sounds normal. No stridor. No respiratory distress. no wheezes or rales. exhibits no tenderness.   Musculoskeletal: Normal range of motion. exhibits no edema. jossy pedal pulses 2+.  Lymphadenopathy: no supraclavicular adenopathy. + left " submandibular lymph node enlarged and tender x 1  Neurological: alert and oriented to person, place, and time. exhibits normal muscle tone. Coordination normal.   Skin: Skin is warm and dry. no diaphoresis.  Pain & tenderness noted to left jaw and submandibular area with palp. No erthema or swelling  Psychiatric: normal mood and affect. behavior is normal.           Assessment and Plan:   The following treatment plan was discussed      Assessment & Plan  1. Jaw pain.  The pain was not improved with Augmentin antibiotic. She is still using Tylenol for the pain. She has a chronic cough which she feels may have contributed to the jaw pain. Dr. Flores's note recommended a CT of the neck and jaw if her symptoms did not improve on the antibiotic. A CT scan of the jaw and neck will be ordered. Follow-up will occur with the results.    2. Diarrhea due to antibiotic.  She has had multiple watery diarrhea stools continuing after she finished her antibiotics one week ago. Stool cultures will be done to assess for any parasites. She is advised to start a probiotic, such as Align, which is available over the counter. She should collect stool samples in the provided cups and return them to the lab for analysis. Follow-up will occur with the results.          ORDERS:  1. Jaw pain    - CT-MAXILLOFACIAL W/O PLUS RECONS; Future  - CT-SOFT TISSUE NECK W/O; Future    2. Diarrhea due to drug    - Cdiff By PCR Rflx Toxin; Future  - CRYPTO/GIARDIA RAPID ASSAY; Future  - SALMONELLA/SHIGELLA SCREEN  - CULTURE STOOL; Future          Please note that this dictation was created using voice recognition software. I have made every reasonable attempt to correct obvious errors, but I expect that there are errors of grammar and possibly content that I did not discover before finalizing the note.      Attestation      Verbal consent was acquired by the patient to use [employer] ambient listening note generation during this visit yes

## 2024-08-30 PROCEDURE — 665998 HH PPS REVENUE CREDIT

## 2024-08-30 PROCEDURE — 665999 HH PPS REVENUE DEBIT

## 2024-09-03 ENCOUNTER — HOSPITAL ENCOUNTER (OUTPATIENT)
Dept: RADIOLOGY | Facility: MEDICAL CENTER | Age: 84
End: 2024-09-03
Attending: NURSE PRACTITIONER
Payer: MEDICARE

## 2024-09-03 ENCOUNTER — HOSPITAL ENCOUNTER (OUTPATIENT)
Facility: MEDICAL CENTER | Age: 84
End: 2024-09-03
Attending: NURSE PRACTITIONER
Payer: MEDICARE

## 2024-09-03 ENCOUNTER — HOME CARE VISIT (OUTPATIENT)
Dept: HOME HEALTH SERVICES | Facility: HOME HEALTHCARE | Age: 84
End: 2024-09-03
Payer: MEDICARE

## 2024-09-03 DIAGNOSIS — R68.84 JAW PAIN: ICD-10-CM

## 2024-09-03 LAB
C PARVUM AG STL QL IA.RAPID: NEGATIVE
G LAMBLIA AG STL QL IA.RAPID: NEGATIVE

## 2024-09-03 PROCEDURE — 87045 FECES CULTURE AEROBIC BACT: CPT

## 2024-09-03 PROCEDURE — 87077 CULTURE AEROBIC IDENTIFY: CPT

## 2024-09-03 PROCEDURE — 87328 CRYPTOSPORIDIUM AG IA: CPT

## 2024-09-03 PROCEDURE — 87899 AGENT NOS ASSAY W/OPTIC: CPT

## 2024-09-03 PROCEDURE — 87324 CLOSTRIDIUM AG IA: CPT | Mod: XU

## 2024-09-03 PROCEDURE — 87046 STOOL CULTR AEROBIC BACT EA: CPT

## 2024-09-03 PROCEDURE — G0151 HHCP-SERV OF PT,EA 15 MIN: HCPCS

## 2024-09-03 PROCEDURE — 87493 C DIFF AMPLIFIED PROBE: CPT

## 2024-09-03 PROCEDURE — 70490 CT SOFT TISSUE NECK W/O DYE: CPT

## 2024-09-03 PROCEDURE — 87329 GIARDIA AG IA: CPT

## 2024-09-04 ENCOUNTER — OFFICE VISIT (OUTPATIENT)
Dept: MEDICAL GROUP | Facility: MEDICAL CENTER | Age: 84
End: 2024-09-04
Payer: MEDICARE

## 2024-09-04 VITALS
HEART RATE: 78 BPM | BODY MASS INDEX: 24.97 KG/M2 | DIASTOLIC BLOOD PRESSURE: 70 MMHG | HEIGHT: 61 IN | WEIGHT: 132.28 LBS | SYSTOLIC BLOOD PRESSURE: 118 MMHG | OXYGEN SATURATION: 94 %

## 2024-09-04 VITALS
TEMPERATURE: 98.1 F | RESPIRATION RATE: 18 BRPM | SYSTOLIC BLOOD PRESSURE: 118 MMHG | DIASTOLIC BLOOD PRESSURE: 65 MMHG | HEART RATE: 68 BPM | OXYGEN SATURATION: 96 %

## 2024-09-04 DIAGNOSIS — R68.84 JAW PAIN: ICD-10-CM

## 2024-09-04 LAB
C DIFF DNA SPEC QL NAA+PROBE: NEGATIVE
C DIFF TOX A+B STL QL IA: NEGATIVE
C DIFF TOX GENS STL QL NAA+PROBE: NORMAL
E COLI SXT1+2 STL IA: NORMAL
SIGNIFICANT IND 70042: NORMAL
SITE SITE: NORMAL
SOURCE SOURCE: NORMAL

## 2024-09-04 PROCEDURE — 3078F DIAST BP <80 MM HG: CPT | Performed by: STUDENT IN AN ORGANIZED HEALTH CARE EDUCATION/TRAINING PROGRAM

## 2024-09-04 PROCEDURE — 99213 OFFICE O/P EST LOW 20 MIN: CPT | Performed by: STUDENT IN AN ORGANIZED HEALTH CARE EDUCATION/TRAINING PROGRAM

## 2024-09-04 PROCEDURE — 3074F SYST BP LT 130 MM HG: CPT | Performed by: STUDENT IN AN ORGANIZED HEALTH CARE EDUCATION/TRAINING PROGRAM

## 2024-09-04 RX ORDER — NAPROXEN 500 MG/1
500 TABLET ORAL 2 TIMES DAILY PRN
Qty: 60 TABLET | Refills: 0 | Status: SHIPPED | OUTPATIENT
Start: 2024-09-04 | End: 2024-11-03

## 2024-09-04 ASSESSMENT — ENCOUNTER SYMPTOMS
PAIN LOCATION: JAW
PAIN LOCATION - PAIN QUALITY: ACHE
PAIN LOCATION - PAIN SEVERITY: 6/10
PERSON REPORTING PAIN: PATIENT
PAIN: 1

## 2024-09-04 ASSESSMENT — PATIENT HEALTH QUESTIONNAIRE - PHQ9: CLINICAL INTERPRETATION OF PHQ2 SCORE: 0

## 2024-09-04 ASSESSMENT — FIBROSIS 4 INDEX: FIB4 SCORE: 1.65

## 2024-09-04 NOTE — PROGRESS NOTES
"Verbal consent was acquired by the patient to use [employer] ambient listening note generation during this visit     Subjective:     HPI:   History of Present Illness  The patient presents for evaluation of jaw pain.     She reports experiencing severe jaw pain,  located below the chin.The pain intensifies when she chews. Her dental history includes a visit to the dentist about a month ago due to a dislodged crown. At that time, she was diagnosed with a cavity and underwent a tooth extraction. Following the procedure, she developed an infection and swelling, which were treated with antibiotics.   Despite the treatment, the pain persisted, prompting a referral to a maxillofacial surgeon, Dr. Sid Hastings. He performed surgery to remove a piece of bone from her right tooth. Two weeks post-surgery, the pain returned, but subsequent x-rays showed no abnormalities. She took multiple courses of antibiotics and was prescribed Tylenol, which provided temporary relief.     She was advised to do a CT scan by her PCP.     CT soft tissue 9/4/24  1.  Limited by lack of IV contrast  2.  No gross inflammatory changes or adenopathy.  3.  Degenerative change of LEFT temporomandibular joint.  4.  Postoperative changes of cervical spine.        Objective:     Exam:  /70   Pulse 78   Ht 1.549 m (5' 1\")   Wt 60 kg (132 lb 4.4 oz)   SpO2 94%   BMI 24.99 kg/m²  Body mass index is 24.99 kg/m².    Physical Exam  Constitutional:       Appearance: Normal appearance.   HENT:      Mouth/Throat:      Mouth: Mucous membranes are moist.      Comments: Dental cavity with multiple fillings  No swelling noted under the jaw, no tenderness.  Pulmonary:      Effort: Pulmonary effort is normal. No respiratory distress.      Breath sounds: Normal breath sounds.   Neurological:      Mental Status: She is alert and oriented to person, place, and time.         Lab studies    Assessment & Plan:     Problem List Items Addressed This Visit       " Jaw pain     -Will start her on NSAIDs naproxen 500 twice daily for pain relief  --Recommend warm compresses to alleviate the pain  -Dental guard for TMJ joint  - Recommend to follow-up with a maxillofacial surgeon regarding persistent postoperative pain                Assessment & Plan                Return if symptoms worsen or fail to improve.    Please note that this dictation was created using voice recognition software. I have made every reasonable attempt to correct obvious errors, but I expect that there are errors of grammar and possibly content that I did not discover before finalizing the note.

## 2024-09-05 ENCOUNTER — HOME CARE VISIT (OUTPATIENT)
Dept: HOME HEALTH SERVICES | Facility: HOME HEALTHCARE | Age: 84
End: 2024-09-05
Payer: MEDICARE

## 2024-09-05 PROCEDURE — G0151 HHCP-SERV OF PT,EA 15 MIN: HCPCS

## 2024-09-05 NOTE — ASSESSMENT & PLAN NOTE
-Will start her on NSAIDs naproxen 500 twice daily for pain relief  --Recommend warm compresses to alleviate the pain  -Dental guard for TMJ joint  - Recommend to follow-up with her maxillofacial surgeon regarding persistent postoperative pain  
Yes

## 2024-09-06 LAB
BACTERIA STL CULT: NORMAL
E COLI SXT1+2 STL IA: NORMAL
SIGNIFICANT IND 70042: NORMAL
SITE SITE: NORMAL
SOURCE SOURCE: NORMAL

## 2024-09-08 VITALS
TEMPERATURE: 97.5 F | DIASTOLIC BLOOD PRESSURE: 70 MMHG | SYSTOLIC BLOOD PRESSURE: 122 MMHG | OXYGEN SATURATION: 96 % | RESPIRATION RATE: 16 BRPM | HEART RATE: 74 BPM

## 2024-09-08 ASSESSMENT — ENCOUNTER SYMPTOMS
PAIN LOCATION: JAW
PAIN LOCATION - PAIN FREQUENCY: CONSTANT
PAIN LOCATION - PAIN SEVERITY: 6/10
PAIN LOCATION - PAIN QUALITY: ACHE
PAIN: 1
PERSON REPORTING PAIN: PATIENT

## 2024-09-08 ASSESSMENT — PATIENT HEALTH QUESTIONNAIRE - PHQ9: CLINICAL INTERPRETATION OF PHQ2 SCORE: 0

## 2024-09-10 ENCOUNTER — HOME CARE VISIT (OUTPATIENT)
Dept: HOME HEALTH SERVICES | Facility: HOME HEALTHCARE | Age: 84
End: 2024-09-10
Payer: MEDICARE

## 2024-09-10 VITALS
RESPIRATION RATE: 16 BRPM | DIASTOLIC BLOOD PRESSURE: 65 MMHG | TEMPERATURE: 97.8 F | SYSTOLIC BLOOD PRESSURE: 120 MMHG | HEART RATE: 62 BPM | OXYGEN SATURATION: 96 %

## 2024-09-10 PROCEDURE — G0151 HHCP-SERV OF PT,EA 15 MIN: HCPCS

## 2024-09-10 ASSESSMENT — ENCOUNTER SYMPTOMS
PAIN LOCATION - PAIN SEVERITY: 6/10
PAIN LOCATION: JAW
PAIN: 1
SUBJECTIVE PAIN PROGRESSION: UNCHANGED
PAIN LOCATION - PAIN FREQUENCY: CONSTANT
PERSON REPORTING PAIN: PATIENT
PAIN LOCATION - PAIN QUALITY: DULL

## 2024-09-10 ASSESSMENT — PATIENT HEALTH QUESTIONNAIRE - PHQ9: CLINICAL INTERPRETATION OF PHQ2 SCORE: 0

## 2024-09-12 ENCOUNTER — HOME CARE VISIT (OUTPATIENT)
Dept: HOME HEALTH SERVICES | Facility: HOME HEALTHCARE | Age: 84
End: 2024-09-12
Payer: MEDICARE

## 2024-09-12 PROCEDURE — G0151 HHCP-SERV OF PT,EA 15 MIN: HCPCS

## 2024-09-12 NOTE — Clinical Note
PT requesting additional HHPT visits with frequency of 2w2 effective week of 09/15/2024 to continue to work on strengthening+flexibility ex to jossy REYES

## 2024-09-13 VITALS
OXYGEN SATURATION: 96 % | TEMPERATURE: 97.9 F | DIASTOLIC BLOOD PRESSURE: 72 MMHG | SYSTOLIC BLOOD PRESSURE: 125 MMHG | HEART RATE: 78 BPM | RESPIRATION RATE: 16 BRPM

## 2024-09-13 ASSESSMENT — ENCOUNTER SYMPTOMS
PERSON REPORTING PAIN: PATIENT
PAIN LOCATION - PAIN DURATION: CHRONIC
PAIN LOCATION - PAIN FREQUENCY: CONSTANT
PAIN LOCATION: JAW
PAIN: 1
PAIN LOCATION - PAIN SEVERITY: 4/10
PAIN LOCATION - PAIN QUALITY: ACHE

## 2024-09-13 ASSESSMENT — PATIENT HEALTH QUESTIONNAIRE - PHQ9: CLINICAL INTERPRETATION OF PHQ2 SCORE: 0

## 2024-09-14 NOTE — CASE COMMUNICATION
noted  ----- Message -----  From: Dajuan Leo, PT  Sent: 9/13/2024   8:03 AM PDT  To: Leelee Lowery R.N.; Jeff Sarkar; Candice Hu      PT requesting additional HHPT visits with frequency of 2w2 effective week of 09/15/2024 to continue to work on strengthening+flexibility ex to jossy REYES

## 2024-09-17 ENCOUNTER — HOME CARE VISIT (OUTPATIENT)
Dept: HOME HEALTH SERVICES | Facility: HOME HEALTHCARE | Age: 84
End: 2024-09-17
Payer: MEDICARE

## 2024-09-17 VITALS
RESPIRATION RATE: 16 BRPM | HEART RATE: 64 BPM | DIASTOLIC BLOOD PRESSURE: 60 MMHG | SYSTOLIC BLOOD PRESSURE: 115 MMHG | TEMPERATURE: 97.7 F | OXYGEN SATURATION: 95 %

## 2024-09-17 PROCEDURE — G0151 HHCP-SERV OF PT,EA 15 MIN: HCPCS

## 2024-09-17 ASSESSMENT — ENCOUNTER SYMPTOMS
PAIN LOCATION - PAIN SEVERITY: 2/10
PERSON REPORTING PAIN: PATIENT
PAIN LOCATION: JAW
PAIN LOCATION - PAIN FREQUENCY: CONSTANT
PAIN LOCATION - PAIN QUALITY: DULL
PAIN: 1

## 2024-09-17 ASSESSMENT — PATIENT HEALTH QUESTIONNAIRE - PHQ9: CLINICAL INTERPRETATION OF PHQ2 SCORE: 0

## 2024-09-19 ENCOUNTER — HOME CARE VISIT (OUTPATIENT)
Dept: HOME HEALTH SERVICES | Facility: HOME HEALTHCARE | Age: 84
End: 2024-09-19
Payer: MEDICARE

## 2024-09-19 PROCEDURE — G0151 HHCP-SERV OF PT,EA 15 MIN: HCPCS

## 2024-09-20 VITALS
RESPIRATION RATE: 16 BRPM | TEMPERATURE: 97.6 F | SYSTOLIC BLOOD PRESSURE: 110 MMHG | DIASTOLIC BLOOD PRESSURE: 60 MMHG | HEART RATE: 68 BPM | OXYGEN SATURATION: 95 %

## 2024-09-20 ASSESSMENT — ENCOUNTER SYMPTOMS
SUBJECTIVE PAIN PROGRESSION: RAPIDLY IMPROVING
PERSON REPORTING PAIN: PATIENT
DENIES PAIN: 1

## 2024-09-20 ASSESSMENT — PATIENT HEALTH QUESTIONNAIRE - PHQ9: CLINICAL INTERPRETATION OF PHQ2 SCORE: 0

## 2024-09-24 ENCOUNTER — HOME CARE VISIT (OUTPATIENT)
Dept: HOME HEALTH SERVICES | Facility: HOME HEALTHCARE | Age: 84
End: 2024-09-24
Payer: MEDICARE

## 2024-09-24 VITALS
TEMPERATURE: 97.9 F | SYSTOLIC BLOOD PRESSURE: 110 MMHG | OXYGEN SATURATION: 97 % | DIASTOLIC BLOOD PRESSURE: 68 MMHG | RESPIRATION RATE: 16 BRPM | HEART RATE: 72 BPM

## 2024-09-24 PROCEDURE — G0151 HHCP-SERV OF PT,EA 15 MIN: HCPCS

## 2024-09-24 ASSESSMENT — ENCOUNTER SYMPTOMS
PAIN: 1
PAIN LOCATION - PAIN SEVERITY: 2/10
SUBJECTIVE PAIN PROGRESSION: GRADUALLY IMPROVING
PAIN LOCATION - PAIN QUALITY: ACHE
PERSON REPORTING PAIN: PATIENT
PAIN LOCATION - PAIN FREQUENCY: CONSTANT

## 2024-09-24 ASSESSMENT — PATIENT HEALTH QUESTIONNAIRE - PHQ9: CLINICAL INTERPRETATION OF PHQ2 SCORE: 0

## 2024-09-26 ENCOUNTER — HOME CARE VISIT (OUTPATIENT)
Dept: HOME HEALTH SERVICES | Facility: HOME HEALTHCARE | Age: 84
End: 2024-09-26
Payer: MEDICARE

## 2024-09-26 PROCEDURE — G0151 HHCP-SERV OF PT,EA 15 MIN: HCPCS

## 2024-09-26 NOTE — Clinical Note
Please request additional HHPT visits with frequency of 2w1 effective week of 09/29/2024 to facilitate dc planning.

## 2024-09-29 VITALS
SYSTOLIC BLOOD PRESSURE: 115 MMHG | OXYGEN SATURATION: 96 % | RESPIRATION RATE: 16 BRPM | HEART RATE: 82 BPM | DIASTOLIC BLOOD PRESSURE: 70 MMHG | TEMPERATURE: 97.7 F

## 2024-09-29 ASSESSMENT — PATIENT HEALTH QUESTIONNAIRE - PHQ9: CLINICAL INTERPRETATION OF PHQ2 SCORE: 0

## 2024-09-29 ASSESSMENT — ENCOUNTER SYMPTOMS
SUBJECTIVE PAIN PROGRESSION: GRADUALLY IMPROVING
PAIN: 1
PAIN LOCATION - PAIN SEVERITY: 2/10
PAIN LOCATION - PAIN FREQUENCY: CONSTANT
PERSON REPORTING PAIN: PATIENT
PAIN LOCATION - PAIN QUALITY: ACHE

## 2024-09-30 NOTE — CASE COMMUNICATION
noted  ----- Message -----  From: Dajuan Leo, PT  Sent: 9/29/2024   7:41 PM PDT  To: Leelee Lowery R.N.; Jeff Sarkar; Candice Hu      Please request additional HHPT visits with frequency of 2w1 effective week of 09/29/2024 to facilitate dc planning.

## 2024-10-01 ENCOUNTER — HOME CARE VISIT (OUTPATIENT)
Dept: HOME HEALTH SERVICES | Facility: HOME HEALTHCARE | Age: 84
End: 2024-10-01
Payer: MEDICARE

## 2024-10-01 PROCEDURE — G0151 HHCP-SERV OF PT,EA 15 MIN: HCPCS

## 2024-10-02 VITALS
OXYGEN SATURATION: 96 % | TEMPERATURE: 97.8 F | HEART RATE: 72 BPM | DIASTOLIC BLOOD PRESSURE: 65 MMHG | RESPIRATION RATE: 16 BRPM | SYSTOLIC BLOOD PRESSURE: 125 MMHG

## 2024-10-02 ASSESSMENT — ENCOUNTER SYMPTOMS
PAIN LOCATION - PAIN QUALITY: ACHE
PAIN LOCATION - PAIN FREQUENCY: CONSTANT
PAIN: 1
PERSON REPORTING PAIN: PATIENT
PAIN LOCATION - PAIN SEVERITY: 4/10

## 2024-10-02 ASSESSMENT — PATIENT HEALTH QUESTIONNAIRE - PHQ9: CLINICAL INTERPRETATION OF PHQ2 SCORE: 0

## 2024-10-03 ENCOUNTER — HOME CARE VISIT (OUTPATIENT)
Dept: HOME HEALTH SERVICES | Facility: HOME HEALTHCARE | Age: 84
End: 2024-10-03
Payer: MEDICARE

## 2024-10-03 VITALS
WEIGHT: 132 LBS | RESPIRATION RATE: 16 BRPM | BODY MASS INDEX: 24.92 KG/M2 | DIASTOLIC BLOOD PRESSURE: 68 MMHG | OXYGEN SATURATION: 97 % | HEIGHT: 61 IN | SYSTOLIC BLOOD PRESSURE: 122 MMHG | HEART RATE: 71 BPM | TEMPERATURE: 97.5 F

## 2024-10-03 PROCEDURE — G0151 HHCP-SERV OF PT,EA 15 MIN: HCPCS

## 2024-10-03 ASSESSMENT — ACTIVITIES OF DAILY LIVING (ADL)
OASIS_M1830: 01
HOME_HEALTH_OASIS: 00

## 2024-10-03 ASSESSMENT — ENCOUNTER SYMPTOMS
PERSON REPORTING PAIN: PATIENT
PAIN LOCATION - PAIN SEVERITY: 2/10
PAIN: 1
PAIN LOCATION - PAIN QUALITY: ACHE
PAIN LOCATION - PAIN FREQUENCY: CONSTANT

## 2024-10-03 ASSESSMENT — FIBROSIS 4 INDEX: FIB4 SCORE: 1.65

## 2024-10-03 ASSESSMENT — PATIENT HEALTH QUESTIONNAIRE - PHQ9: CLINICAL INTERPRETATION OF PHQ2 SCORE: 0

## 2024-11-27 ENCOUNTER — HOSPITAL ENCOUNTER (OUTPATIENT)
Dept: LAB | Facility: MEDICAL CENTER | Age: 84
End: 2024-11-27
Attending: STUDENT IN AN ORGANIZED HEALTH CARE EDUCATION/TRAINING PROGRAM
Payer: MEDICARE

## 2024-11-27 DIAGNOSIS — E78.5 HYPERLIPIDEMIA ASSOCIATED WITH TYPE 2 DIABETES MELLITUS (HCC): ICD-10-CM

## 2024-11-27 DIAGNOSIS — E11.69 HYPERLIPIDEMIA ASSOCIATED WITH TYPE 2 DIABETES MELLITUS (HCC): ICD-10-CM

## 2024-11-27 DIAGNOSIS — E11.59 TYPE 2 DIABETES MELLITUS WITH OTHER CIRCULATORY COMPLICATION, WITHOUT LONG-TERM CURRENT USE OF INSULIN (HCC): ICD-10-CM

## 2024-11-27 LAB
ALBUMIN SERPL BCP-MCNC: 4.1 G/DL (ref 3.2–4.9)
ALT SERPL-CCNC: 14 U/L (ref 2–50)
AST SERPL-CCNC: 22 U/L (ref 12–45)
BASOPHILS # BLD AUTO: 0.4 % (ref 0–1.8)
BASOPHILS # BLD: 0.03 K/UL (ref 0–0.12)
CHOLEST SERPL-MCNC: 150 MG/DL (ref 100–199)
CREAT SERPL-MCNC: 0.81 MG/DL (ref 0.5–1.4)
EOSINOPHIL # BLD AUTO: 0.1 K/UL (ref 0–0.51)
EOSINOPHIL NFR BLD: 1.3 % (ref 0–6.9)
ERYTHROCYTE [DISTWIDTH] IN BLOOD BY AUTOMATED COUNT: 55 FL (ref 35.9–50)
ERYTHROCYTE [SEDIMENTATION RATE] IN BLOOD BY WESTERGREN METHOD: 57 MM/HOUR (ref 0–25)
EST. AVERAGE GLUCOSE BLD GHB EST-MCNC: 146 MG/DL
FASTING STATUS PATIENT QL REPORTED: NORMAL
GFR SERPLBLD CREATININE-BSD FMLA CKD-EPI: 72 ML/MIN/1.73 M 2
HBA1C MFR BLD: 6.7 % (ref 4–5.6)
HCT VFR BLD AUTO: 39 % (ref 37–47)
HDLC SERPL-MCNC: 53 MG/DL
HGB BLD-MCNC: 12.3 G/DL (ref 12–16)
IMM GRANULOCYTES # BLD AUTO: 0.01 K/UL (ref 0–0.11)
IMM GRANULOCYTES NFR BLD AUTO: 0.1 % (ref 0–0.9)
LDLC SERPL CALC-MCNC: 80 MG/DL
LYMPHOCYTES # BLD AUTO: 1.33 K/UL (ref 1–4.8)
LYMPHOCYTES NFR BLD: 17 % (ref 22–41)
MCH RBC QN AUTO: 33.6 PG (ref 27–33)
MCHC RBC AUTO-ENTMCNC: 31.5 G/DL (ref 32.2–35.5)
MCV RBC AUTO: 106.6 FL (ref 81.4–97.8)
MONOCYTES # BLD AUTO: 0.73 K/UL (ref 0–0.85)
MONOCYTES NFR BLD AUTO: 9.3 % (ref 0–13.4)
NEUTROPHILS # BLD AUTO: 5.63 K/UL (ref 1.82–7.42)
NEUTROPHILS NFR BLD: 71.9 % (ref 44–72)
NRBC # BLD AUTO: 0 K/UL
NRBC BLD-RTO: 0 /100 WBC (ref 0–0.2)
PLATELET # BLD AUTO: 304 K/UL (ref 164–446)
PMV BLD AUTO: 9.7 FL (ref 9–12.9)
RBC # BLD AUTO: 3.66 M/UL (ref 4.2–5.4)
TRIGL SERPL-MCNC: 83 MG/DL (ref 0–149)
WBC # BLD AUTO: 7.8 K/UL (ref 4.8–10.8)

## 2024-11-27 PROCEDURE — 36415 COLL VENOUS BLD VENIPUNCTURE: CPT

## 2024-11-27 PROCEDURE — 83036 HEMOGLOBIN GLYCOSYLATED A1C: CPT

## 2024-11-27 PROCEDURE — 85652 RBC SED RATE AUTOMATED: CPT

## 2024-11-27 PROCEDURE — 84450 TRANSFERASE (AST) (SGOT): CPT

## 2024-11-27 PROCEDURE — 80061 LIPID PANEL: CPT

## 2024-11-27 PROCEDURE — 84460 ALANINE AMINO (ALT) (SGPT): CPT

## 2024-11-27 PROCEDURE — 85025 COMPLETE CBC W/AUTO DIFF WBC: CPT

## 2024-11-27 PROCEDURE — 82565 ASSAY OF CREATININE: CPT

## 2024-11-27 PROCEDURE — 82040 ASSAY OF SERUM ALBUMIN: CPT

## 2024-12-16 ENCOUNTER — HOSPITAL ENCOUNTER (OUTPATIENT)
Dept: LAB | Facility: MEDICAL CENTER | Age: 84
End: 2024-12-16
Attending: INTERNAL MEDICINE
Payer: MEDICARE

## 2024-12-16 LAB
ALBUMIN SERPL BCP-MCNC: 4.1 G/DL (ref 3.2–4.9)
ALBUMIN/GLOB SERPL: 1.2 G/DL
ALP SERPL-CCNC: 102 U/L (ref 30–99)
ALT SERPL-CCNC: 19 U/L (ref 2–50)
ANION GAP SERPL CALC-SCNC: 12 MMOL/L (ref 7–16)
AST SERPL-CCNC: 26 U/L (ref 12–45)
BASOPHILS # BLD AUTO: 0.4 % (ref 0–1.8)
BASOPHILS # BLD: 0.03 K/UL (ref 0–0.12)
BILIRUB SERPL-MCNC: 0.9 MG/DL (ref 0.1–1.5)
BUN SERPL-MCNC: 19 MG/DL (ref 8–22)
CALCIUM ALBUM COR SERPL-MCNC: 9.9 MG/DL (ref 8.5–10.5)
CALCIUM SERPL-MCNC: 10 MG/DL (ref 8.4–10.2)
CHLORIDE SERPL-SCNC: 100 MMOL/L (ref 96–112)
CO2 SERPL-SCNC: 24 MMOL/L (ref 20–33)
CREAT SERPL-MCNC: 0.8 MG/DL (ref 0.5–1.4)
CRP SERPL HS-MCNC: <0.3 MG/DL (ref 0–0.75)
EOSINOPHIL # BLD AUTO: 0.12 K/UL (ref 0–0.51)
EOSINOPHIL NFR BLD: 1.6 % (ref 0–6.9)
ERYTHROCYTE [DISTWIDTH] IN BLOOD BY AUTOMATED COUNT: 53.7 FL (ref 35.9–50)
ERYTHROCYTE [SEDIMENTATION RATE] IN BLOOD BY WESTERGREN METHOD: 34 MM/HOUR (ref 0–25)
FASTING STATUS PATIENT QL REPORTED: NORMAL
GFR SERPLBLD CREATININE-BSD FMLA CKD-EPI: 73 ML/MIN/1.73 M 2
GLOBULIN SER CALC-MCNC: 3.4 G/DL (ref 1.9–3.5)
GLUCOSE SERPL-MCNC: 132 MG/DL (ref 65–99)
HCT VFR BLD AUTO: 40.3 % (ref 37–47)
HGB BLD-MCNC: 13 G/DL (ref 12–16)
IMM GRANULOCYTES # BLD AUTO: 0.01 K/UL (ref 0–0.11)
IMM GRANULOCYTES NFR BLD AUTO: 0.1 % (ref 0–0.9)
LYMPHOCYTES # BLD AUTO: 1.22 K/UL (ref 1–4.8)
LYMPHOCYTES NFR BLD: 15.9 % (ref 22–41)
MCH RBC QN AUTO: 34.2 PG (ref 27–33)
MCHC RBC AUTO-ENTMCNC: 32.3 G/DL (ref 32.2–35.5)
MCV RBC AUTO: 106.1 FL (ref 81.4–97.8)
MONOCYTES # BLD AUTO: 0.5 K/UL (ref 0–0.85)
MONOCYTES NFR BLD AUTO: 6.5 % (ref 0–13.4)
NEUTROPHILS # BLD AUTO: 5.81 K/UL (ref 1.82–7.42)
NEUTROPHILS NFR BLD: 75.5 % (ref 44–72)
NRBC # BLD AUTO: 0 K/UL
NRBC BLD-RTO: 0 /100 WBC (ref 0–0.2)
PLATELET # BLD AUTO: 256 K/UL (ref 164–446)
PMV BLD AUTO: 9 FL (ref 9–12.9)
POTASSIUM SERPL-SCNC: 4.9 MMOL/L (ref 3.6–5.5)
PROT SERPL-MCNC: 7.5 G/DL (ref 6–8.2)
RBC # BLD AUTO: 3.8 M/UL (ref 4.2–5.4)
SODIUM SERPL-SCNC: 136 MMOL/L (ref 135–145)
WBC # BLD AUTO: 7.7 K/UL (ref 4.8–10.8)

## 2024-12-16 PROCEDURE — 86140 C-REACTIVE PROTEIN: CPT

## 2024-12-16 PROCEDURE — 85025 COMPLETE CBC W/AUTO DIFF WBC: CPT

## 2024-12-16 PROCEDURE — 36415 COLL VENOUS BLD VENIPUNCTURE: CPT

## 2024-12-16 PROCEDURE — 80053 COMPREHEN METABOLIC PANEL: CPT

## 2024-12-16 PROCEDURE — 85652 RBC SED RATE AUTOMATED: CPT

## 2025-01-07 ENCOUNTER — HOSPITAL ENCOUNTER (OUTPATIENT)
Dept: RADIOLOGY | Facility: MEDICAL CENTER | Age: 85
End: 2025-01-07
Attending: INTERNAL MEDICINE
Payer: MEDICARE

## 2025-01-07 ENCOUNTER — HOSPITAL ENCOUNTER (OUTPATIENT)
Facility: MEDICAL CENTER | Age: 85
End: 2025-01-07
Attending: INTERNAL MEDICINE
Payer: MEDICARE

## 2025-01-07 DIAGNOSIS — M27.2 ABSCESS OF JAW: ICD-10-CM

## 2025-01-07 DIAGNOSIS — M86.68 CHRONIC OSTEOMYELITIS OF SYMPHYSIS PUBIS (HCC): ICD-10-CM

## 2025-01-07 LAB
ALBUMIN SERPL BCP-MCNC: 4.1 G/DL (ref 3.2–4.9)
ALBUMIN/GLOB SERPL: 1.6 G/DL
ALP SERPL-CCNC: 86 U/L (ref 30–99)
ALT SERPL-CCNC: 16 U/L (ref 2–50)
ANION GAP SERPL CALC-SCNC: 10 MMOL/L (ref 7–16)
AST SERPL-CCNC: 21 U/L (ref 12–45)
BASOPHILS # BLD AUTO: 0.5 % (ref 0–1.8)
BASOPHILS # BLD: 0.04 K/UL (ref 0–0.12)
BILIRUB SERPL-MCNC: 0.4 MG/DL (ref 0.1–1.5)
BUN SERPL-MCNC: 27 MG/DL (ref 8–22)
CALCIUM ALBUM COR SERPL-MCNC: 9.1 MG/DL (ref 8.5–10.5)
CALCIUM SERPL-MCNC: 9.2 MG/DL (ref 8.5–10.5)
CHLORIDE SERPL-SCNC: 105 MMOL/L (ref 96–112)
CO2 SERPL-SCNC: 28 MMOL/L (ref 20–33)
CREAT SERPL-MCNC: 0.73 MG/DL (ref 0.5–1.4)
CRP SERPL HS-MCNC: 0.59 MG/DL (ref 0–0.75)
EOSINOPHIL # BLD AUTO: 0.1 K/UL (ref 0–0.51)
EOSINOPHIL NFR BLD: 1.4 % (ref 0–6.9)
ERYTHROCYTE [DISTWIDTH] IN BLOOD BY AUTOMATED COUNT: 55.6 FL (ref 35.9–50)
ERYTHROCYTE [SEDIMENTATION RATE] IN BLOOD BY WESTERGREN METHOD: 72 MM/HOUR (ref 0–25)
GFR SERPLBLD CREATININE-BSD FMLA CKD-EPI: 82 ML/MIN/1.73 M 2
GLOBULIN SER CALC-MCNC: 2.5 G/DL (ref 1.9–3.5)
GLUCOSE SERPL-MCNC: 125 MG/DL (ref 65–99)
HCT VFR BLD AUTO: 36 % (ref 37–47)
HGB BLD-MCNC: 11.6 G/DL (ref 12–16)
IMM GRANULOCYTES # BLD AUTO: 0.02 K/UL (ref 0–0.11)
IMM GRANULOCYTES NFR BLD AUTO: 0.3 % (ref 0–0.9)
LYMPHOCYTES # BLD AUTO: 1.16 K/UL (ref 1–4.8)
LYMPHOCYTES NFR BLD: 15.8 % (ref 22–41)
MCH RBC QN AUTO: 34.2 PG (ref 27–33)
MCHC RBC AUTO-ENTMCNC: 32.2 G/DL (ref 32.2–35.5)
MCV RBC AUTO: 106.2 FL (ref 81.4–97.8)
MONOCYTES # BLD AUTO: 0.9 K/UL (ref 0–0.85)
MONOCYTES NFR BLD AUTO: 12.3 % (ref 0–13.4)
NEUTROPHILS # BLD AUTO: 5.12 K/UL (ref 1.82–7.42)
NEUTROPHILS NFR BLD: 69.7 % (ref 44–72)
NRBC # BLD AUTO: 0 K/UL
NRBC BLD-RTO: 0 /100 WBC (ref 0–0.2)
PLATELET # BLD AUTO: 249 K/UL (ref 164–446)
PMV BLD AUTO: 10.4 FL (ref 9–12.9)
POTASSIUM SERPL-SCNC: 3.8 MMOL/L (ref 3.6–5.5)
PROT SERPL-MCNC: 6.6 G/DL (ref 6–8.2)
RBC # BLD AUTO: 3.39 M/UL (ref 4.2–5.4)
SODIUM SERPL-SCNC: 143 MMOL/L (ref 135–145)
WBC # BLD AUTO: 7.3 K/UL (ref 4.8–10.8)

## 2025-01-07 PROCEDURE — C1751 CATH, INF, PER/CENT/MIDLINE: HCPCS

## 2025-01-07 PROCEDURE — 85652 RBC SED RATE AUTOMATED: CPT

## 2025-01-07 PROCEDURE — 85025 COMPLETE CBC W/AUTO DIFF WBC: CPT

## 2025-01-07 PROCEDURE — 80053 COMPREHEN METABOLIC PANEL: CPT

## 2025-01-07 PROCEDURE — 86140 C-REACTIVE PROTEIN: CPT

## 2025-01-07 NOTE — PROCEDURES
PICC Insertion Final 3CG    Consents confirmed, vessel patency confirmed with ultrasound. Risks and benefits of procedure explained to patient/family and education regarding central line associated bloodstream infections provided. Questions answered.     PICC placed in LUE per MD order with ultrasound guidance. 4 Fr, single lumen PICC placed in brachial vein after 1 attempt(s). 3 cc's of 1% lidocaine injected intradermally, 21 gauge microintroducer needle and modified Seldinger technique used. 46 cm catheter inserted with good blood return. Secured at 0 cm marker. Each lumen flushed without resistance with 10 mL 0.9% normal saline. PICC line secured with Biopatch and Tegaderm.    PICC placement is confirmed by nurse using 3CG technology. PICC line is appropriate for use at this time. Pt tolerated procedure well.  Patient condition relayed to unit RN or ordering physician via this post procedure note in the EMR.     BARD Power PICC ref #[id number], Lot #[id number] EXP 11-

## 2025-01-08 ENCOUNTER — TELEPHONE (OUTPATIENT)
Dept: MEDICAL GROUP | Facility: MEDICAL CENTER | Age: 85
End: 2025-01-08
Payer: MEDICARE

## 2025-01-09 RX ORDER — ATORVASTATIN CALCIUM 40 MG/1
40 TABLET, FILM COATED ORAL EVERY EVENING
Qty: 100 TABLET | Refills: 2 | Status: SHIPPED | OUTPATIENT
Start: 2025-01-09

## 2025-01-09 NOTE — TELEPHONE ENCOUNTER
Pts  came in requesting atorvastatin (LIPITOR) 40 MG Tab . Said her previous doctor was the one to originally prescribe it to her. [facility] on [address] is the correct pharmacy. Please call pt if medication can not get filled. Thank you!

## 2025-01-13 ENCOUNTER — HOSPITAL ENCOUNTER (OUTPATIENT)
Facility: MEDICAL CENTER | Age: 85
End: 2025-01-13
Attending: INTERNAL MEDICINE
Payer: MEDICARE

## 2025-01-13 LAB
ALBUMIN SERPL BCP-MCNC: 4.2 G/DL (ref 3.2–4.9)
ALBUMIN/GLOB SERPL: 1.6 G/DL
ALP SERPL-CCNC: 88 U/L (ref 30–99)
ALT SERPL-CCNC: 28 U/L (ref 2–50)
ANION GAP SERPL CALC-SCNC: 14 MMOL/L (ref 7–16)
AST SERPL-CCNC: 33 U/L (ref 12–45)
BASOPHILS # BLD AUTO: 0.6 % (ref 0–1.8)
BASOPHILS # BLD: 0.04 K/UL (ref 0–0.12)
BILIRUB SERPL-MCNC: 0.3 MG/DL (ref 0.1–1.5)
BUN SERPL-MCNC: 26 MG/DL (ref 8–22)
CALCIUM ALBUM COR SERPL-MCNC: 9.2 MG/DL (ref 8.5–10.5)
CALCIUM SERPL-MCNC: 9.4 MG/DL (ref 8.5–10.5)
CHLORIDE SERPL-SCNC: 104 MMOL/L (ref 96–112)
CO2 SERPL-SCNC: 22 MMOL/L (ref 20–33)
CREAT SERPL-MCNC: 0.77 MG/DL (ref 0.5–1.4)
CRP SERPL HS-MCNC: <0.3 MG/DL (ref 0–0.75)
EOSINOPHIL # BLD AUTO: 0.22 K/UL (ref 0–0.51)
EOSINOPHIL NFR BLD: 3.4 % (ref 0–6.9)
ERYTHROCYTE [DISTWIDTH] IN BLOOD BY AUTOMATED COUNT: 56 FL (ref 35.9–50)
GFR SERPLBLD CREATININE-BSD FMLA CKD-EPI: 77 ML/MIN/1.73 M 2
GLOBULIN SER CALC-MCNC: 2.6 G/DL (ref 1.9–3.5)
GLUCOSE SERPL-MCNC: 128 MG/DL (ref 65–99)
HCT VFR BLD AUTO: 37.5 % (ref 37–47)
HGB BLD-MCNC: 12 G/DL (ref 12–16)
IMM GRANULOCYTES # BLD AUTO: 0.01 K/UL (ref 0–0.11)
IMM GRANULOCYTES NFR BLD AUTO: 0.2 % (ref 0–0.9)
LYMPHOCYTES # BLD AUTO: 1.39 K/UL (ref 1–4.8)
LYMPHOCYTES NFR BLD: 21.6 % (ref 22–41)
MCH RBC QN AUTO: 34.4 PG (ref 27–33)
MCHC RBC AUTO-ENTMCNC: 32 G/DL (ref 32.2–35.5)
MCV RBC AUTO: 107.4 FL (ref 81.4–97.8)
MONOCYTES # BLD AUTO: 0.65 K/UL (ref 0–0.85)
MONOCYTES NFR BLD AUTO: 10.1 % (ref 0–13.4)
NEUTROPHILS # BLD AUTO: 4.13 K/UL (ref 1.82–7.42)
NEUTROPHILS NFR BLD: 64.1 % (ref 44–72)
NRBC # BLD AUTO: 0 K/UL
NRBC BLD-RTO: 0 /100 WBC (ref 0–0.2)
PLATELET # BLD AUTO: 283 K/UL (ref 164–446)
PMV BLD AUTO: 10.1 FL (ref 9–12.9)
POTASSIUM SERPL-SCNC: 4.6 MMOL/L (ref 3.6–5.5)
PROT SERPL-MCNC: 6.8 G/DL (ref 6–8.2)
RBC # BLD AUTO: 3.49 M/UL (ref 4.2–5.4)
SODIUM SERPL-SCNC: 140 MMOL/L (ref 135–145)
WBC # BLD AUTO: 6.4 K/UL (ref 4.8–10.8)

## 2025-01-13 PROCEDURE — 85652 RBC SED RATE AUTOMATED: CPT

## 2025-01-13 PROCEDURE — 80053 COMPREHEN METABOLIC PANEL: CPT

## 2025-01-13 PROCEDURE — 86140 C-REACTIVE PROTEIN: CPT

## 2025-01-13 PROCEDURE — 85025 COMPLETE CBC W/AUTO DIFF WBC: CPT

## 2025-01-14 LAB — ERYTHROCYTE [SEDIMENTATION RATE] IN BLOOD BY WESTERGREN METHOD: 52 MM/HOUR (ref 0–25)

## 2025-01-20 ENCOUNTER — HOSPITAL ENCOUNTER (OUTPATIENT)
Facility: MEDICAL CENTER | Age: 85
End: 2025-01-20
Attending: INTERNAL MEDICINE
Payer: MEDICARE

## 2025-01-20 LAB
ALBUMIN SERPL BCP-MCNC: 3.9 G/DL (ref 3.2–4.9)
ALBUMIN/GLOB SERPL: 1.6 G/DL
ALP SERPL-CCNC: 91 U/L (ref 30–99)
ALT SERPL-CCNC: 35 U/L (ref 2–50)
ANION GAP SERPL CALC-SCNC: 12 MMOL/L (ref 7–16)
AST SERPL-CCNC: 42 U/L (ref 12–45)
BASOPHILS # BLD AUTO: 0.9 % (ref 0–1.8)
BASOPHILS # BLD: 0.07 K/UL (ref 0–0.12)
BILIRUB SERPL-MCNC: 0.2 MG/DL (ref 0.1–1.5)
BUN SERPL-MCNC: 18 MG/DL (ref 8–22)
CALCIUM ALBUM COR SERPL-MCNC: 9.3 MG/DL (ref 8.5–10.5)
CALCIUM SERPL-MCNC: 9.2 MG/DL (ref 8.5–10.5)
CHLORIDE SERPL-SCNC: 106 MMOL/L (ref 96–112)
CO2 SERPL-SCNC: 22 MMOL/L (ref 20–33)
CREAT SERPL-MCNC: 0.82 MG/DL (ref 0.5–1.4)
CRP SERPL HS-MCNC: <0.3 MG/DL (ref 0–0.75)
EOSINOPHIL # BLD AUTO: 0.21 K/UL (ref 0–0.51)
EOSINOPHIL NFR BLD: 2.7 % (ref 0–6.9)
ERYTHROCYTE [DISTWIDTH] IN BLOOD BY AUTOMATED COUNT: 52.6 FL (ref 35.9–50)
ERYTHROCYTE [SEDIMENTATION RATE] IN BLOOD BY WESTERGREN METHOD: 40 MM/HOUR (ref 0–25)
GFR SERPLBLD CREATININE-BSD FMLA CKD-EPI: 71 ML/MIN/1.73 M 2
GLOBULIN SER CALC-MCNC: 2.4 G/DL (ref 1.9–3.5)
GLUCOSE SERPL-MCNC: 194 MG/DL (ref 65–99)
HCT VFR BLD AUTO: 36.1 % (ref 37–47)
HGB BLD-MCNC: 11.7 G/DL (ref 12–16)
IMM GRANULOCYTES # BLD AUTO: 0.02 K/UL (ref 0–0.11)
IMM GRANULOCYTES NFR BLD AUTO: 0.3 % (ref 0–0.9)
LYMPHOCYTES # BLD AUTO: 1.35 K/UL (ref 1–4.8)
LYMPHOCYTES NFR BLD: 17.6 % (ref 22–41)
MCH RBC QN AUTO: 33.8 PG (ref 27–33)
MCHC RBC AUTO-ENTMCNC: 32.4 G/DL (ref 32.2–35.5)
MCV RBC AUTO: 104.3 FL (ref 81.4–97.8)
MONOCYTES # BLD AUTO: 0.71 K/UL (ref 0–0.85)
MONOCYTES NFR BLD AUTO: 9.3 % (ref 0–13.4)
NEUTROPHILS # BLD AUTO: 5.3 K/UL (ref 1.82–7.42)
NEUTROPHILS NFR BLD: 69.2 % (ref 44–72)
NRBC # BLD AUTO: 0 K/UL
NRBC BLD-RTO: 0 /100 WBC (ref 0–0.2)
PLATELET # BLD AUTO: 295 K/UL (ref 164–446)
PMV BLD AUTO: 10.3 FL (ref 9–12.9)
POTASSIUM SERPL-SCNC: 4.1 MMOL/L (ref 3.6–5.5)
PROT SERPL-MCNC: 6.3 G/DL (ref 6–8.2)
RBC # BLD AUTO: 3.46 M/UL (ref 4.2–5.4)
SODIUM SERPL-SCNC: 140 MMOL/L (ref 135–145)
WBC # BLD AUTO: 7.7 K/UL (ref 4.8–10.8)

## 2025-01-20 PROCEDURE — 85025 COMPLETE CBC W/AUTO DIFF WBC: CPT

## 2025-01-20 PROCEDURE — 85652 RBC SED RATE AUTOMATED: CPT

## 2025-01-20 PROCEDURE — 80053 COMPREHEN METABOLIC PANEL: CPT

## 2025-01-20 PROCEDURE — 86140 C-REACTIVE PROTEIN: CPT

## 2025-01-27 ENCOUNTER — HOSPITAL ENCOUNTER (OUTPATIENT)
Facility: MEDICAL CENTER | Age: 85
End: 2025-01-27
Attending: INTERNAL MEDICINE
Payer: MEDICARE

## 2025-01-27 LAB
ALBUMIN SERPL BCP-MCNC: 3.8 G/DL (ref 3.2–4.9)
ALBUMIN/GLOB SERPL: 1.5 G/DL
ALP SERPL-CCNC: 71 U/L (ref 30–99)
ALT SERPL-CCNC: 36 U/L (ref 2–50)
ANION GAP SERPL CALC-SCNC: 9 MMOL/L (ref 7–16)
AST SERPL-CCNC: 39 U/L (ref 12–45)
BASOPHILS # BLD AUTO: 1.5 % (ref 0–1.8)
BASOPHILS # BLD: 0.07 K/UL (ref 0–0.12)
BILIRUB SERPL-MCNC: 0.2 MG/DL (ref 0.1–1.5)
BUN SERPL-MCNC: 19 MG/DL (ref 8–22)
CALCIUM ALBUM COR SERPL-MCNC: 9.6 MG/DL (ref 8.5–10.5)
CALCIUM SERPL-MCNC: 9.4 MG/DL (ref 8.5–10.5)
CHLORIDE SERPL-SCNC: 106 MMOL/L (ref 96–112)
CO2 SERPL-SCNC: 23 MMOL/L (ref 20–33)
CREAT SERPL-MCNC: 0.86 MG/DL (ref 0.5–1.4)
CRP SERPL HS-MCNC: <0.3 MG/DL (ref 0–0.75)
EOSINOPHIL # BLD AUTO: 0.26 K/UL (ref 0–0.51)
EOSINOPHIL NFR BLD: 5.5 % (ref 0–6.9)
ERYTHROCYTE [DISTWIDTH] IN BLOOD BY AUTOMATED COUNT: 55.8 FL (ref 35.9–50)
GFR SERPLBLD CREATININE-BSD FMLA CKD-EPI: 67 ML/MIN/1.73 M 2
GLOBULIN SER CALC-MCNC: 2.6 G/DL (ref 1.9–3.5)
GLUCOSE SERPL-MCNC: 122 MG/DL (ref 65–99)
HCT VFR BLD AUTO: 37 % (ref 37–47)
HGB BLD-MCNC: 11.8 G/DL (ref 12–16)
IMM GRANULOCYTES # BLD AUTO: 0.01 K/UL (ref 0–0.11)
IMM GRANULOCYTES NFR BLD AUTO: 0.2 % (ref 0–0.9)
LYMPHOCYTES # BLD AUTO: 1.31 K/UL (ref 1–4.8)
LYMPHOCYTES NFR BLD: 27.9 % (ref 22–41)
MCH RBC QN AUTO: 33.4 PG (ref 27–33)
MCHC RBC AUTO-ENTMCNC: 31.9 G/DL (ref 32.2–35.5)
MCV RBC AUTO: 104.8 FL (ref 81.4–97.8)
MONOCYTES # BLD AUTO: 0.83 K/UL (ref 0–0.85)
MONOCYTES NFR BLD AUTO: 17.7 % (ref 0–13.4)
NEUTROPHILS # BLD AUTO: 2.22 K/UL (ref 1.82–7.42)
NEUTROPHILS NFR BLD: 47.2 % (ref 44–72)
NRBC # BLD AUTO: 0 K/UL
NRBC BLD-RTO: 0 /100 WBC (ref 0–0.2)
PLATELET # BLD AUTO: 246 K/UL (ref 164–446)
PMV BLD AUTO: 10.4 FL (ref 9–12.9)
POTASSIUM SERPL-SCNC: 4.3 MMOL/L (ref 3.6–5.5)
PROT SERPL-MCNC: 6.4 G/DL (ref 6–8.2)
RBC # BLD AUTO: 3.53 M/UL (ref 4.2–5.4)
SODIUM SERPL-SCNC: 138 MMOL/L (ref 135–145)
WBC # BLD AUTO: 4.7 K/UL (ref 4.8–10.8)

## 2025-01-27 PROCEDURE — 85652 RBC SED RATE AUTOMATED: CPT

## 2025-01-27 PROCEDURE — 86140 C-REACTIVE PROTEIN: CPT

## 2025-01-27 PROCEDURE — 80053 COMPREHEN METABOLIC PANEL: CPT

## 2025-01-27 PROCEDURE — 85025 COMPLETE CBC W/AUTO DIFF WBC: CPT

## 2025-01-28 LAB — ERYTHROCYTE [SEDIMENTATION RATE] IN BLOOD BY WESTERGREN METHOD: 30 MM/HOUR (ref 0–25)

## 2025-01-31 ENCOUNTER — APPOINTMENT (OUTPATIENT)
Dept: MEDICAL GROUP | Facility: MEDICAL CENTER | Age: 85
End: 2025-01-31
Payer: MEDICARE

## 2025-02-03 ENCOUNTER — APPOINTMENT (OUTPATIENT)
Dept: MEDICAL GROUP | Facility: MEDICAL CENTER | Age: 85
End: 2025-02-03
Payer: MEDICARE

## 2025-02-03 ENCOUNTER — HOSPITAL ENCOUNTER (OUTPATIENT)
Facility: MEDICAL CENTER | Age: 85
End: 2025-02-03
Attending: INTERNAL MEDICINE
Payer: MEDICARE

## 2025-02-03 ENCOUNTER — HOSPITAL ENCOUNTER (OUTPATIENT)
Facility: MEDICAL CENTER | Age: 85
End: 2025-02-03
Attending: STUDENT IN AN ORGANIZED HEALTH CARE EDUCATION/TRAINING PROGRAM
Payer: MEDICARE

## 2025-02-03 VITALS
HEIGHT: 61 IN | OXYGEN SATURATION: 92 % | HEART RATE: 79 BPM | SYSTOLIC BLOOD PRESSURE: 124 MMHG | WEIGHT: 136.8 LBS | DIASTOLIC BLOOD PRESSURE: 68 MMHG | BODY MASS INDEX: 25.83 KG/M2 | TEMPERATURE: 97.2 F

## 2025-02-03 DIAGNOSIS — I15.2 HYPERTENSION ASSOCIATED WITH DIABETES (HCC): ICD-10-CM

## 2025-02-03 DIAGNOSIS — E11.59 HYPERTENSION ASSOCIATED WITH DIABETES (HCC): ICD-10-CM

## 2025-02-03 DIAGNOSIS — I70.0 ATHEROSCLEROSIS OF AORTA (HCC): ICD-10-CM

## 2025-02-03 DIAGNOSIS — D84.9 IMMUNOSUPPRESSED STATUS (HCC): ICD-10-CM

## 2025-02-03 DIAGNOSIS — R80.9 MICROALBUMINURIA DUE TO TYPE 2 DIABETES MELLITUS (HCC): ICD-10-CM

## 2025-02-03 DIAGNOSIS — E78.5 HYPERLIPIDEMIA ASSOCIATED WITH TYPE 2 DIABETES MELLITUS (HCC): ICD-10-CM

## 2025-02-03 DIAGNOSIS — L81.9 DISCOLORATION OF SKIN: ICD-10-CM

## 2025-02-03 DIAGNOSIS — E11.69 HYPERLIPIDEMIA ASSOCIATED WITH TYPE 2 DIABETES MELLITUS (HCC): ICD-10-CM

## 2025-02-03 DIAGNOSIS — E11.59 TYPE 2 DIABETES MELLITUS WITH OTHER CIRCULATORY COMPLICATION, WITHOUT LONG-TERM CURRENT USE OF INSULIN (HCC): ICD-10-CM

## 2025-02-03 DIAGNOSIS — E11.29 MICROALBUMINURIA DUE TO TYPE 2 DIABETES MELLITUS (HCC): ICD-10-CM

## 2025-02-03 LAB
BASOPHILS # BLD AUTO: 1 % (ref 0–1.8)
BASOPHILS # BLD: 0.05 K/UL (ref 0–0.12)
EOSINOPHIL # BLD AUTO: 0.28 K/UL (ref 0–0.51)
EOSINOPHIL NFR BLD: 5.6 % (ref 0–6.9)
ERYTHROCYTE [DISTWIDTH] IN BLOOD BY AUTOMATED COUNT: 56.1 FL (ref 35.9–50)
ERYTHROCYTE [SEDIMENTATION RATE] IN BLOOD BY WESTERGREN METHOD: 23 MM/HOUR (ref 0–25)
HCT VFR BLD AUTO: 37.1 % (ref 37–47)
HGB BLD-MCNC: 11.8 G/DL (ref 12–16)
IMM GRANULOCYTES # BLD AUTO: 0.01 K/UL (ref 0–0.11)
IMM GRANULOCYTES NFR BLD AUTO: 0.2 % (ref 0–0.9)
LYMPHOCYTES # BLD AUTO: 1.27 K/UL (ref 1–4.8)
LYMPHOCYTES NFR BLD: 25.3 % (ref 22–41)
MCH RBC QN AUTO: 33.3 PG (ref 27–33)
MCHC RBC AUTO-ENTMCNC: 31.8 G/DL (ref 32.2–35.5)
MCV RBC AUTO: 104.8 FL (ref 81.4–97.8)
MONOCYTES # BLD AUTO: 0.78 K/UL (ref 0–0.85)
MONOCYTES NFR BLD AUTO: 15.5 % (ref 0–13.4)
NEUTROPHILS # BLD AUTO: 2.63 K/UL (ref 1.82–7.42)
NEUTROPHILS NFR BLD: 52.4 % (ref 44–72)
NRBC # BLD AUTO: 0 K/UL
NRBC BLD-RTO: 0 /100 WBC (ref 0–0.2)
PLATELET # BLD AUTO: 241 K/UL (ref 164–446)
PMV BLD AUTO: 10.1 FL (ref 9–12.9)
RBC # BLD AUTO: 3.54 M/UL (ref 4.2–5.4)
WBC # BLD AUTO: 5 K/UL (ref 4.8–10.8)

## 2025-02-03 PROCEDURE — 86140 C-REACTIVE PROTEIN: CPT

## 2025-02-03 PROCEDURE — 80053 COMPREHEN METABOLIC PANEL: CPT

## 2025-02-03 PROCEDURE — 3078F DIAST BP <80 MM HG: CPT | Performed by: STUDENT IN AN ORGANIZED HEALTH CARE EDUCATION/TRAINING PROGRAM

## 2025-02-03 PROCEDURE — 82570 ASSAY OF URINE CREATININE: CPT

## 2025-02-03 PROCEDURE — 3074F SYST BP LT 130 MM HG: CPT | Performed by: STUDENT IN AN ORGANIZED HEALTH CARE EDUCATION/TRAINING PROGRAM

## 2025-02-03 PROCEDURE — 85025 COMPLETE CBC W/AUTO DIFF WBC: CPT

## 2025-02-03 PROCEDURE — 99214 OFFICE O/P EST MOD 30 MIN: CPT | Performed by: STUDENT IN AN ORGANIZED HEALTH CARE EDUCATION/TRAINING PROGRAM

## 2025-02-03 PROCEDURE — 82043 UR ALBUMIN QUANTITATIVE: CPT

## 2025-02-03 PROCEDURE — 85652 RBC SED RATE AUTOMATED: CPT

## 2025-02-03 ASSESSMENT — FIBROSIS 4 INDEX: FIB4 SCORE: 2.166666666666666667

## 2025-02-03 NOTE — PROGRESS NOTES
"Subjective:     CC: Diabetes, hyperlipidemia, microalbuminuria    HPI:   Jewell presents today for diabetes follow-up.  A1c is 6.7.  She is on metformin and Actos with good results.  She does take a statin.  She has hyperlipidemia associated with her diabetes.  She also has hypertension which is well-controlled.  For her hypertension she is taking hydrochlorothiazide, amlodipine, and losartan.  She also has microalbuminuria associated with her diabetes.    She has chronic discoloration of her skin on her left hand that has been there for several years.  She would like this evaluated.    She is also undergoing IV antibiotics for a dental infection.  This is being managed by infectious disease.  ROS:  ROS    Objective:     Exam:  /68 (BP Location: Left arm, Patient Position: Sitting, BP Cuff Size: Adult)   Pulse 79   Temp 36.2 °C (97.2 °F) (Temporal)   Ht 1.549 m (5' 1\")   Wt 62.1 kg (136 lb 12.8 oz)   SpO2 92%   BMI 25.85 kg/m²  Body mass index is 25.85 kg/m².    Physical Exam  Vitals reviewed.   Constitutional:       General: She is not in acute distress.     Appearance: She is not toxic-appearing.   HENT:      Head: Normocephalic and atraumatic.      Right Ear: External ear normal.      Left Ear: External ear normal.   Eyes:      General:         Right eye: No discharge.         Left eye: No discharge.      Extraocular Movements: Extraocular movements intact.      Conjunctiva/sclera: Conjunctivae normal.   Pulmonary:      Effort: Pulmonary effort is normal. No respiratory distress.   Feet:      Right foot:      Protective Sensation: 3 sites tested.  3 sites sensed.      Left foot:      Protective Sensation: 3 sites tested.  3 sites sensed.   Skin:     General: Skin is warm and dry.   Neurological:      Mental Status: She is alert.   Psychiatric:         Mood and Affect: Mood normal.         Behavior: Behavior normal.         Thought Content: Thought content normal.         Judgment: Judgment normal. "           Assessment & Plan:     84 y.o. female with the following -     1. Discoloration of skin  Referral to dermatology sent  - Referral to Dermatology    2. Type 2 diabetes mellitus with other circulatory complication, without long-term current use of insulin (HCC)  Well-controlled, continue metformin and Actos, monofilament completed today and Mycobutin collected today  - Diabetic Monofilament LE Exam  - MICROALBUMIN CREAT RATIO URINE; Future    3. Microalbuminuria due to type 2 diabetes mellitus (HCC)  Continue losartan, recheck microalbumin    4. Immunosuppressed status ([facility])  Continue to follow with rheumatology    5. Hypertension associated with diabetes ([facility])  Well-controlled today, continue hydrochlorothiazide, losartan, amlodipine    6. Hyperlipidemia associated with type 2 diabetes mellitus ([facility])  Continue statin    7. Atherosclerosis of aorta ([facility])  Continue cholesterol and blood pressure management        No follow-ups on file.    Please note that this dictation was created using voice recognition software. I have made every reasonable attempt to correct obvious errors, but I expect that there are errors of grammar and possibly content that I did not discover before finalizing the note.

## 2025-02-04 LAB
ALBUMIN SERPL BCP-MCNC: 3.9 G/DL (ref 3.2–4.9)
ALBUMIN/GLOB SERPL: 1.4 G/DL
ALP SERPL-CCNC: 67 U/L (ref 30–99)
ALT SERPL-CCNC: 38 U/L (ref 2–50)
ANION GAP SERPL CALC-SCNC: 10 MMOL/L (ref 7–16)
AST SERPL-CCNC: 39 U/L (ref 12–45)
BILIRUB SERPL-MCNC: 0.2 MG/DL (ref 0.1–1.5)
BUN SERPL-MCNC: 18 MG/DL (ref 8–22)
CALCIUM ALBUM COR SERPL-MCNC: 9.8 MG/DL (ref 8.5–10.5)
CALCIUM SERPL-MCNC: 9.7 MG/DL (ref 8.5–10.5)
CHLORIDE SERPL-SCNC: 105 MMOL/L (ref 96–112)
CO2 SERPL-SCNC: 22 MMOL/L (ref 20–33)
CREAT SERPL-MCNC: 0.91 MG/DL (ref 0.5–1.4)
CREAT UR-MCNC: 28.88 MG/DL
CRP SERPL HS-MCNC: <0.3 MG/DL (ref 0–0.75)
GFR SERPLBLD CREATININE-BSD FMLA CKD-EPI: 63 ML/MIN/1.73 M 2
GLOBULIN SER CALC-MCNC: 2.7 G/DL (ref 1.9–3.5)
GLUCOSE SERPL-MCNC: 123 MG/DL (ref 65–99)
MICROALBUMIN UR-MCNC: <1.2 MG/DL
MICROALBUMIN/CREAT UR: NORMAL MG/G (ref 0–30)
POTASSIUM SERPL-SCNC: 4.7 MMOL/L (ref 3.6–5.5)
PROT SERPL-MCNC: 6.6 G/DL (ref 6–8.2)
SODIUM SERPL-SCNC: 137 MMOL/L (ref 135–145)

## 2025-02-10 ENCOUNTER — HOSPITAL ENCOUNTER (OUTPATIENT)
Facility: MEDICAL CENTER | Age: 85
End: 2025-02-10
Attending: INTERNAL MEDICINE
Payer: MEDICARE

## 2025-02-10 PROCEDURE — 85025 COMPLETE CBC W/AUTO DIFF WBC: CPT

## 2025-02-10 PROCEDURE — 85652 RBC SED RATE AUTOMATED: CPT

## 2025-02-10 PROCEDURE — 86140 C-REACTIVE PROTEIN: CPT

## 2025-02-10 PROCEDURE — 80053 COMPREHEN METABOLIC PANEL: CPT

## 2025-02-11 LAB
ALBUMIN SERPL BCP-MCNC: 4 G/DL (ref 3.2–4.9)
ALBUMIN/GLOB SERPL: 1.5 G/DL
ALP SERPL-CCNC: 70 U/L (ref 30–99)
ALT SERPL-CCNC: 44 U/L (ref 2–50)
ANION GAP SERPL CALC-SCNC: 11 MMOL/L (ref 7–16)
AST SERPL-CCNC: 42 U/L (ref 12–45)
BASOPHILS # BLD AUTO: 1.3 % (ref 0–1.8)
BASOPHILS # BLD: 0.06 K/UL (ref 0–0.12)
BILIRUB SERPL-MCNC: 0.3 MG/DL (ref 0.1–1.5)
BUN SERPL-MCNC: 22 MG/DL (ref 8–22)
CALCIUM ALBUM COR SERPL-MCNC: 9.5 MG/DL (ref 8.5–10.5)
CALCIUM SERPL-MCNC: 9.5 MG/DL (ref 8.5–10.5)
CHLORIDE SERPL-SCNC: 102 MMOL/L (ref 96–112)
CO2 SERPL-SCNC: 22 MMOL/L (ref 20–33)
CREAT SERPL-MCNC: 0.92 MG/DL (ref 0.5–1.4)
CRP SERPL HS-MCNC: <0.3 MG/DL (ref 0–0.75)
EOSINOPHIL # BLD AUTO: 0.23 K/UL (ref 0–0.51)
EOSINOPHIL NFR BLD: 4.9 % (ref 0–6.9)
ERYTHROCYTE [DISTWIDTH] IN BLOOD BY AUTOMATED COUNT: 56.1 FL (ref 35.9–50)
ERYTHROCYTE [SEDIMENTATION RATE] IN BLOOD BY WESTERGREN METHOD: 25 MM/HOUR (ref 0–25)
GFR SERPLBLD CREATININE-BSD FMLA CKD-EPI: 62 ML/MIN/1.73 M 2
GLOBULIN SER CALC-MCNC: 2.7 G/DL (ref 1.9–3.5)
GLUCOSE SERPL-MCNC: 122 MG/DL (ref 65–99)
HCT VFR BLD AUTO: 38.8 % (ref 37–47)
HGB BLD-MCNC: 12.5 G/DL (ref 12–16)
IMM GRANULOCYTES # BLD AUTO: 0.01 K/UL (ref 0–0.11)
IMM GRANULOCYTES NFR BLD AUTO: 0.2 % (ref 0–0.9)
LYMPHOCYTES # BLD AUTO: 1.21 K/UL (ref 1–4.8)
LYMPHOCYTES NFR BLD: 25.7 % (ref 22–41)
MCH RBC QN AUTO: 34 PG (ref 27–33)
MCHC RBC AUTO-ENTMCNC: 32.2 G/DL (ref 32.2–35.5)
MCV RBC AUTO: 105.4 FL (ref 81.4–97.8)
MONOCYTES # BLD AUTO: 0.71 K/UL (ref 0–0.85)
MONOCYTES NFR BLD AUTO: 15.1 % (ref 0–13.4)
NEUTROPHILS # BLD AUTO: 2.49 K/UL (ref 1.82–7.42)
NEUTROPHILS NFR BLD: 52.8 % (ref 44–72)
NRBC # BLD AUTO: 0 K/UL
NRBC BLD-RTO: 0 /100 WBC (ref 0–0.2)
PLATELET # BLD AUTO: 272 K/UL (ref 164–446)
PMV BLD AUTO: 10.2 FL (ref 9–12.9)
POTASSIUM SERPL-SCNC: 4.5 MMOL/L (ref 3.6–5.5)
PROT SERPL-MCNC: 6.7 G/DL (ref 6–8.2)
RBC # BLD AUTO: 3.68 M/UL (ref 4.2–5.4)
SODIUM SERPL-SCNC: 135 MMOL/L (ref 135–145)
WBC # BLD AUTO: 4.7 K/UL (ref 4.8–10.8)

## 2025-03-10 ENCOUNTER — HOSPITAL ENCOUNTER (OUTPATIENT)
Facility: MEDICAL CENTER | Age: 85
End: 2025-03-10
Attending: NURSE PRACTITIONER
Payer: MEDICARE

## 2025-03-10 LAB
ALBUMIN SERPL BCP-MCNC: 4.4 G/DL (ref 3.2–4.9)
ALBUMIN/GLOB SERPL: 1.5 G/DL
ALP SERPL-CCNC: 80 U/L (ref 30–99)
ALT SERPL-CCNC: 23 U/L (ref 2–50)
ANION GAP SERPL CALC-SCNC: 11 MMOL/L (ref 7–16)
AST SERPL-CCNC: 32 U/L (ref 12–45)
BASOPHILS # BLD AUTO: 0.6 % (ref 0–1.8)
BASOPHILS # BLD: 0.04 K/UL (ref 0–0.12)
BILIRUB SERPL-MCNC: 0.6 MG/DL (ref 0.1–1.5)
BUN SERPL-MCNC: 25 MG/DL (ref 8–22)
CALCIUM ALBUM COR SERPL-MCNC: 10 MG/DL (ref 8.5–10.5)
CALCIUM SERPL-MCNC: 10.3 MG/DL (ref 8.4–10.2)
CHLORIDE SERPL-SCNC: 104 MMOL/L (ref 96–112)
CO2 SERPL-SCNC: 24 MMOL/L (ref 20–33)
CREAT SERPL-MCNC: 0.91 MG/DL (ref 0.5–1.4)
CRP SERPL HS-MCNC: <0.3 MG/DL (ref 0–0.75)
EOSINOPHIL # BLD AUTO: 0.15 K/UL (ref 0–0.51)
EOSINOPHIL NFR BLD: 2.2 % (ref 0–6.9)
ERYTHROCYTE [DISTWIDTH] IN BLOOD BY AUTOMATED COUNT: 49.4 FL (ref 35.9–50)
ERYTHROCYTE [SEDIMENTATION RATE] IN BLOOD BY WESTERGREN METHOD: 20 MM/HOUR (ref 0–25)
FASTING STATUS PATIENT QL REPORTED: NORMAL
GFR SERPLBLD CREATININE-BSD FMLA CKD-EPI: 63 ML/MIN/1.73 M 2
GLOBULIN SER CALC-MCNC: 3 G/DL (ref 1.9–3.5)
GLUCOSE SERPL-MCNC: 118 MG/DL (ref 65–99)
HCT VFR BLD AUTO: 41 % (ref 37–47)
HGB BLD-MCNC: 13.3 G/DL (ref 12–16)
IMM GRANULOCYTES # BLD AUTO: 0.01 K/UL (ref 0–0.11)
IMM GRANULOCYTES NFR BLD AUTO: 0.1 % (ref 0–0.9)
LYMPHOCYTES # BLD AUTO: 1.63 K/UL (ref 1–4.8)
LYMPHOCYTES NFR BLD: 23.4 % (ref 22–41)
MCH RBC QN AUTO: 33.5 PG (ref 27–33)
MCHC RBC AUTO-ENTMCNC: 32.4 G/DL (ref 32.2–35.5)
MCV RBC AUTO: 103.3 FL (ref 81.4–97.8)
MONOCYTES # BLD AUTO: 0.55 K/UL (ref 0–0.85)
MONOCYTES NFR BLD AUTO: 7.9 % (ref 0–13.4)
NEUTROPHILS # BLD AUTO: 4.58 K/UL (ref 1.82–7.42)
NEUTROPHILS NFR BLD: 65.8 % (ref 44–72)
NRBC # BLD AUTO: 0 K/UL
NRBC BLD-RTO: 0 /100 WBC (ref 0–0.2)
PLATELET # BLD AUTO: 241 K/UL (ref 164–446)
PMV BLD AUTO: 9.3 FL (ref 9–12.9)
POTASSIUM SERPL-SCNC: 4.7 MMOL/L (ref 3.6–5.5)
PROT SERPL-MCNC: 7.4 G/DL (ref 6–8.2)
RBC # BLD AUTO: 3.97 M/UL (ref 4.2–5.4)
SODIUM SERPL-SCNC: 139 MMOL/L (ref 135–145)
WBC # BLD AUTO: 7 K/UL (ref 4.8–10.8)

## 2025-03-10 PROCEDURE — 86140 C-REACTIVE PROTEIN: CPT

## 2025-03-10 PROCEDURE — 85025 COMPLETE CBC W/AUTO DIFF WBC: CPT

## 2025-03-10 PROCEDURE — 80053 COMPREHEN METABOLIC PANEL: CPT

## 2025-03-10 PROCEDURE — 85652 RBC SED RATE AUTOMATED: CPT

## 2025-03-10 PROCEDURE — 36415 COLL VENOUS BLD VENIPUNCTURE: CPT

## 2025-04-16 RX ORDER — AMLODIPINE BESYLATE 5 MG/1
5 TABLET ORAL DAILY
Qty: 100 TABLET | Refills: 2 | Status: SHIPPED | OUTPATIENT
Start: 2025-04-16

## 2025-04-16 NOTE — TELEPHONE ENCOUNTER
Received request via: Patient    Was the patient seen in the last year in this department? Yes    Does the patient have an active prescription (recently filled or refills available) for medication(s) requested? No    Pharmacy Name: [facility]    Does the patient have half-way Plus and need 100-day supply? (This applies to ALL medications) Yes, quantity updated to 100 days

## 2025-06-19 ENCOUNTER — TELEPHONE (OUTPATIENT)
Dept: HEALTH INFORMATION MANAGEMENT | Facility: OTHER | Age: 85
End: 2025-06-19
Payer: MEDICARE

## 2025-07-07 DIAGNOSIS — E11.59 TYPE 2 DIABETES MELLITUS WITH OTHER CIRCULATORY COMPLICATION, WITHOUT LONG-TERM CURRENT USE OF INSULIN (HCC): ICD-10-CM

## 2025-07-07 RX ORDER — PIOGLITAZONE 15 MG/1
15 TABLET ORAL DAILY
Qty: 100 TABLET | Refills: 3 | Status: SHIPPED | OUTPATIENT
Start: 2025-07-07

## 2025-07-07 NOTE — TELEPHONE ENCOUNTER
Received request via: Pharmacy    Was the patient seen in the last year in this department? Yes    Does the patient have an active prescription (recently filled or refills available) for medication(s) requested? No    Pharmacy Name: [facility]    Does the patient have care home Plus and need 100-day supply? (This applies to ALL medications) Yes, quantity updated to 100 days

## 2025-07-15 ENCOUNTER — HOSPITAL ENCOUNTER (OUTPATIENT)
Facility: MEDICAL CENTER | Age: 85
End: 2025-07-15
Attending: INTERNAL MEDICINE
Payer: MEDICARE

## 2025-07-15 ENCOUNTER — HOSPITAL ENCOUNTER (OUTPATIENT)
Facility: MEDICAL CENTER | Age: 85
End: 2025-07-15
Payer: MEDICARE

## 2025-07-15 LAB
ALT SERPL-CCNC: 26 U/L (ref 2–50)
APPEARANCE UR: CLEAR
AST SERPL-CCNC: 30 U/L (ref 12–45)
BACTERIA #/AREA URNS HPF: ABNORMAL /HPF
BASOPHILS # BLD AUTO: 0.6 % (ref 0–1.8)
BASOPHILS # BLD: 0.03 K/UL (ref 0–0.12)
BILIRUB UR QL STRIP.AUTO: NEGATIVE
CASTS URNS QL MICRO: ABNORMAL /LPF (ref 0–2)
COLOR UR: YELLOW
CREAT SERPL-MCNC: 0.92 MG/DL (ref 0.5–1.4)
EOSINOPHIL # BLD AUTO: 0.11 K/UL (ref 0–0.51)
EOSINOPHIL NFR BLD: 2.1 % (ref 0–6.9)
EPITHELIAL CELLS 1715: ABNORMAL /HPF (ref 0–5)
ERYTHROCYTE [DISTWIDTH] IN BLOOD BY AUTOMATED COUNT: 54.1 FL (ref 35.9–50)
ERYTHROCYTE [SEDIMENTATION RATE] IN BLOOD BY WESTERGREN METHOD: 24 MM/HOUR (ref 0–25)
GFR SERPLBLD CREATININE-BSD FMLA CKD-EPI: 62 ML/MIN/1.73 M 2
GLUCOSE UR STRIP.AUTO-MCNC: NEGATIVE MG/DL
HCT VFR BLD AUTO: 36.2 % (ref 37–47)
HGB BLD-MCNC: 11.7 G/DL (ref 12–16)
IMM GRANULOCYTES # BLD AUTO: 0.02 K/UL (ref 0–0.11)
IMM GRANULOCYTES NFR BLD AUTO: 0.4 % (ref 0–0.9)
KETONES UR STRIP.AUTO-MCNC: ABNORMAL MG/DL
LEUKOCYTE ESTERASE UR QL STRIP.AUTO: ABNORMAL
LYMPHOCYTES # BLD AUTO: 1.31 K/UL (ref 1–4.8)
LYMPHOCYTES NFR BLD: 25 % (ref 22–41)
MCH RBC QN AUTO: 34.9 PG (ref 27–33)
MCHC RBC AUTO-ENTMCNC: 32.3 G/DL (ref 32.2–35.5)
MCV RBC AUTO: 108.1 FL (ref 81.4–97.8)
MICRO URNS: ABNORMAL
MONOCYTES # BLD AUTO: 0.59 K/UL (ref 0–0.85)
MONOCYTES NFR BLD AUTO: 11.3 % (ref 0–13.4)
NEUTROPHILS # BLD AUTO: 3.17 K/UL (ref 1.82–7.42)
NEUTROPHILS NFR BLD: 60.6 % (ref 44–72)
NITRITE UR QL STRIP.AUTO: NEGATIVE
NRBC # BLD AUTO: 0 K/UL
NRBC BLD-RTO: 0 /100 WBC (ref 0–0.2)
PH UR STRIP.AUTO: 6 [PH] (ref 5–8)
PLATELET # BLD AUTO: 237 K/UL (ref 164–446)
PMV BLD AUTO: 9.8 FL (ref 9–12.9)
PROT UR QL STRIP: NEGATIVE MG/DL
RBC # BLD AUTO: 3.35 M/UL (ref 4.2–5.4)
RBC # URNS HPF: ABNORMAL /HPF
RBC UR QL AUTO: NEGATIVE
SP GR UR STRIP.AUTO: 1.01
UROBILINOGEN UR STRIP.AUTO-MCNC: 0.2 EU/DL
WBC # BLD AUTO: 5.2 K/UL (ref 4.8–10.8)
WBC #/AREA URNS HPF: ABNORMAL /HPF

## 2025-07-15 PROCEDURE — 81001 URINALYSIS AUTO W/SCOPE: CPT

## 2025-07-15 PROCEDURE — 84460 ALANINE AMINO (ALT) (SGPT): CPT

## 2025-07-15 PROCEDURE — 36415 COLL VENOUS BLD VENIPUNCTURE: CPT

## 2025-07-15 PROCEDURE — 82565 ASSAY OF CREATININE: CPT

## 2025-07-15 PROCEDURE — 84450 TRANSFERASE (AST) (SGOT): CPT

## 2025-07-15 PROCEDURE — 85652 RBC SED RATE AUTOMATED: CPT

## 2025-07-15 PROCEDURE — 85025 COMPLETE CBC W/AUTO DIFF WBC: CPT

## 2025-07-15 PROCEDURE — 87086 URINE CULTURE/COLONY COUNT: CPT

## 2025-07-17 LAB
BACTERIA UR CULT: NORMAL
SIGNIFICANT IND 70042: NORMAL
SITE SITE: NORMAL
SOURCE SOURCE: NORMAL

## 2025-07-25 ENCOUNTER — OFFICE VISIT (OUTPATIENT)
Dept: MEDICAL GROUP | Facility: MEDICAL CENTER | Age: 85
End: 2025-07-25
Payer: MEDICARE

## 2025-07-25 VITALS
TEMPERATURE: 97.5 F | HEIGHT: 60 IN | OXYGEN SATURATION: 92 % | BODY MASS INDEX: 25.82 KG/M2 | SYSTOLIC BLOOD PRESSURE: 126 MMHG | HEART RATE: 84 BPM | DIASTOLIC BLOOD PRESSURE: 60 MMHG | RESPIRATION RATE: 16 BRPM | WEIGHT: 131.5 LBS

## 2025-07-25 DIAGNOSIS — M06.9 RHEUMATOID ARTHRITIS INVOLVING MULTIPLE JOINTS (HCC): ICD-10-CM

## 2025-07-25 DIAGNOSIS — R19.5 CHANGE IN CONSISTENCY OF STOOL: Primary | ICD-10-CM

## 2025-07-25 DIAGNOSIS — E11.59 TYPE 2 DIABETES MELLITUS WITH OTHER CIRCULATORY COMPLICATION, WITHOUT LONG-TERM CURRENT USE OF INSULIN (HCC): ICD-10-CM

## 2025-07-25 DIAGNOSIS — R35.0 URINARY FREQUENCY: ICD-10-CM

## 2025-07-25 LAB
APPEARANCE UR: CLEAR
BILIRUB UR STRIP-MCNC: NEGATIVE MG/DL
COLOR UR AUTO: YELLOW
GLUCOSE UR STRIP.AUTO-MCNC: NEGATIVE MG/DL
HBA1C MFR BLD: 6.3 % (ref ?–5.8)
KETONES UR STRIP.AUTO-MCNC: NEGATIVE MG/DL
LEUKOCYTE ESTERASE UR QL STRIP.AUTO: NEGATIVE
NITRITE UR QL STRIP.AUTO: NEGATIVE
PH UR STRIP.AUTO: 6.5 [PH] (ref 5–8)
POCT INT CON NEG: NEGATIVE
POCT INT CON POS: POSITIVE
PROT UR QL STRIP: NEGATIVE MG/DL
RBC UR QL AUTO: NEGATIVE
SP GR UR STRIP.AUTO: 1.01
UROBILINOGEN UR STRIP-MCNC: 0.2 MG/DL

## 2025-07-25 PROCEDURE — 3078F DIAST BP <80 MM HG: CPT | Performed by: STUDENT IN AN ORGANIZED HEALTH CARE EDUCATION/TRAINING PROGRAM

## 2025-07-25 PROCEDURE — 83036 HEMOGLOBIN GLYCOSYLATED A1C: CPT | Performed by: STUDENT IN AN ORGANIZED HEALTH CARE EDUCATION/TRAINING PROGRAM

## 2025-07-25 PROCEDURE — 99214 OFFICE O/P EST MOD 30 MIN: CPT | Performed by: STUDENT IN AN ORGANIZED HEALTH CARE EDUCATION/TRAINING PROGRAM

## 2025-07-25 PROCEDURE — 3074F SYST BP LT 130 MM HG: CPT | Performed by: STUDENT IN AN ORGANIZED HEALTH CARE EDUCATION/TRAINING PROGRAM

## 2025-07-25 PROCEDURE — 81002 URINALYSIS NONAUTO W/O SCOPE: CPT | Performed by: STUDENT IN AN ORGANIZED HEALTH CARE EDUCATION/TRAINING PROGRAM

## 2025-07-25 RX ORDER — DORZOLAMIDE HCL 20 MG/ML
1 SOLUTION/ DROPS OPHTHALMIC 2 TIMES DAILY
COMMUNITY
Start: 2025-07-22

## 2025-07-25 ASSESSMENT — FIBROSIS 4 INDEX: FIB4 SCORE: 2.06

## 2025-07-25 NOTE — PROGRESS NOTES
Subjective:     CC: Urinary frequency, follow-up chronic conditions    HPI:   Jewell presents today with new urinary frequency.  She says it has been there a few weeks.  She does not have any burning with urination but does feel like she urinates all the time.  She is with a family member who agrees.  She did see her rheumatologist and had a UA that was positive for leukocyte esterase but the culture was normal.    She also has had changes in stool.  She has alternating constipation and diarrhea    Problem   Rheumatoid Arthritis Involving Multiple Joints (Hcc)    Chronic condition. Followed by rheumatology Dr. Acosta.  Current regimen: methotrexate 2.5mg weekly and folic acid 1mg daily          Type 2 Diabetes Mellitus With Circulatory Disorder, Without Long-Term Current Use of Insulin (Hcc)    This is a chronic condition, currently well-controlled with metformin at 1000 mg twice daily and Actos 15 mg daily.  She is on an ARB as well as a statin       ROS:  ROS    Objective:     Exam:  /60 (BP Location: Left arm, Patient Position: Sitting, BP Cuff Size: Large adult)   Pulse 84   Temp 36.4 °C (97.5 °F) (Temporal)   Resp 16   Ht 1.524 m (5')   Wt 59.6 kg (131 lb 8 oz)   SpO2 92%   BMI 25.68 kg/m²  Body mass index is 25.68 kg/m².    Physical Exam  Vitals reviewed.   Constitutional:       General: She is not in acute distress.     Appearance: She is not toxic-appearing.   HENT:      Head: Normocephalic and atraumatic.      Right Ear: External ear normal.      Left Ear: External ear normal.   Eyes:      General:         Right eye: No discharge.         Left eye: No discharge.      Extraocular Movements: Extraocular movements intact.      Conjunctiva/sclera: Conjunctivae normal.   Pulmonary:      Effort: Pulmonary effort is normal. No respiratory distress.   Abdominal:      General: Abdomen is flat. Bowel sounds are normal. There is no distension.      Palpations: Abdomen is soft. There is no mass.       Tenderness: There is no abdominal tenderness. There is no guarding.      Hernia: No hernia is present.   Skin:     General: Skin is warm and dry.   Neurological:      Mental Status: She is alert.   Psychiatric:         Mood and Affect: Mood normal.         Behavior: Behavior normal.         Thought Content: Thought content normal.         Judgment: Judgment normal.           Assessment & Plan:     85 y.o. female with the following -     1. Urinary frequency  Point-of-care UA did not show signs of infection.  Referral to urology sent  - POCT Urinalysis  - Referral to Urology    2. Type 2 diabetes mellitus with other circulatory complication, without long-term current use of insulin (HCC)  A1c is 6.3, at goal, continue current medications  - POCT  A1C    3. Rheumatoid arthritis involving multiple joints (HCC)  Continue to follow with rheumatology    4. Change in consistency of stool (Primary)  We discussed trying a fiber supplement daily to normalize stool consistency.  Patient will reach out if this does not help        No follow-ups on file.    Please note that this dictation was created using voice recognition software. I have made every reasonable attempt to correct obvious errors, but I expect that there are errors of grammar and possibly content that I did not discover before finalizing the note.

## 2025-07-28 ENCOUNTER — RESULTS FOLLOW-UP (OUTPATIENT)
Dept: MEDICAL GROUP | Facility: MEDICAL CENTER | Age: 85
End: 2025-07-28
Payer: MEDICARE

## 2025-07-31 NOTE — Clinical Note
REFERRAL APPROVAL NOTICE         Sent on July 31, 2025                   Jewell Diaz  [address] [facility] [address]                   Dear Ms. Diaz,    After a careful review of the medical information and benefit coverage, [facility] has processed your referral. See below for additional details.    If applicable, you must be actively enrolled with your insurance for coverage of the authorized service. If you have any questions regarding your coverage, please contact your insurance directly.    REFERRAL INFORMATION   Referral #:  [id number]  Referred-To Department    Referred-By Provider:  Urology    Kaia Arvizu M.D.   [facility]      [address]  [address]  [facility] [address] [phone] [address]  [facility] [id number] [phone]    Referral Start Date:  07/25/2025  Referral End Date:   07/25/2026             SCHEDULING  If you do not already have an appointment, please call [phone] to make an appointment.     MORE INFORMATION  If you do not already have a [facility] account, sign up at: [email or web address]  You can access your medical information, make appointments, see lab results, billing information, and more.  If you have questions regarding this referral, please contact  the [facility] Referrals department at:             [phone]. Monday - Friday 8:00AM - 5:00PM.     Sincerely,    [facility]

## 2025-08-11 ENCOUNTER — PATIENT MESSAGE (OUTPATIENT)
Dept: MEDICAL GROUP | Facility: MEDICAL CENTER | Age: 85
End: 2025-08-11
Payer: MEDICARE

## 2025-08-11 RX ORDER — LOSARTAN POTASSIUM 100 MG/1
100 TABLET ORAL DAILY
Qty: 100 TABLET | Refills: 3 | Status: SHIPPED | OUTPATIENT
Start: 2025-08-11

## 2025-08-26 ENCOUNTER — OFFICE VISIT (OUTPATIENT)
Dept: UROLOGY | Facility: MEDICAL CENTER | Age: 85
End: 2025-08-26
Payer: MEDICARE

## 2025-08-26 DIAGNOSIS — R35.0 URINARY FREQUENCY: ICD-10-CM

## 2025-08-26 DIAGNOSIS — N32.81 OAB (OVERACTIVE BLADDER): Primary | ICD-10-CM

## 2025-08-26 LAB
POC POST-VOID: 99 ML
POC PRE-VOID: NORMAL

## (undated) DEVICE — TUBE SUCTION YANKAUER  1/4 X 6FT (20EA/CA)"

## (undated) DEVICE — TUBE E-T HI-LO CUFF 7.5MM (10EA/PK)

## (undated) DEVICE — SET EXTENSION WITH 2 PORTS (48EA/CA) ***PART #2C8610 IS A SUBSTITUTE*****

## (undated) DEVICE — CANISTER SUCTION RIGID RED 1500CC (40EA/CA)

## (undated) DEVICE — BALLOON RETRIEVAL EXTRACTOR PRO RX   9-12MM

## (undated) DEVICE — MASK WITH FACE SHIELD (25/BX 4BX/CA)

## (undated) DEVICE — GLOVE BIOGEL INDICATOR SZ 7.5 SURGICAL PF LTX - (50PR/BX 4BX/CA)

## (undated) DEVICE — CLIP SM INTNL HRZN TI ESCP LGT - (24EA/PK 25PK/BX)

## (undated) DEVICE — FORCEP RADIAL JAW 4 STANDARD CAPACITY W/NEEDLE 240CM (40EA/BX)

## (undated) DEVICE — GUIDE JAGWIRE 035 STRAIGHT (2EA/BX)

## (undated) DEVICE — CLOSURE SKIN STRIP 1/2 X 4 IN - (STERI STRIP) (50/BX 4BX/CA)

## (undated) DEVICE — DRAPE STRLE REG TOWEL 18X24 - (10/BX 4BX/CA)"

## (undated) DEVICE — CATHETER SPYSCOPE DSII

## (undated) DEVICE — SCREW DISTRACTION 14MM YELLOW - STERILE (10EA/BX) (5TX4=20)

## (undated) DEVICE — GOWN WARMING STANDARD FLEX - (30/CA)

## (undated) DEVICE — SPHINCTEROTOME CLEVER CUT

## (undated) DEVICE — TUBING C&T SET FLYING LEADS DRAIN TUBING (10EA/BX)

## (undated) DEVICE — TUBE E-T HI-LO CUFF 8.0MM (10EA/PK)

## (undated) DEVICE — KIT CUSTOM PROCEDURE SINGLE FOR ENDO  (15/CA)

## (undated) DEVICE — SPHINCTEROTOME ENDOSCOPIC JAGTOME RX 44 .035IN 30MM 260CM 4.4FR

## (undated) DEVICE — GOWN SURGEONS LARGE - (32/CA)

## (undated) DEVICE — ELECTRODE DUAL RETURN W/ CORD - (50/PK)

## (undated) DEVICE — SYRINGE SAFETY 5 ML 18 GA X 1-1/2 BLUNT LL (100/BX 4BX/CA)

## (undated) DEVICE — SUTURE GENERAL

## (undated) DEVICE — LACTATED RINGERS INJ 1000 ML - (14EA/CA 60CA/PF)

## (undated) DEVICE — GLOVE BIOGEL SZ 7 SURGICAL PF LTX - (50PR/BX 4BX/CA)

## (undated) DEVICE — SPHINCTERTOME TRUETOME 44 20MM PRELOAD JAG 035IN

## (undated) DEVICE — CLIP MED INTNL HRZN TI ESCP - (25/BX)

## (undated) DEVICE — SYRINGE DISP. 60 CC LL - (30/BX, 12BX/CA)**WHEN THESE ARE GONE ORDER #500206**

## (undated) DEVICE — TUBING CLEARLINK DUO-VENT - C-FLO (48EA/CA)

## (undated) DEVICE — SYRINGE 12 CC LUER TIP - (80/BX) OBSOLETE ITEM

## (undated) DEVICE — SPONGE GAUZE NON-STERILE 4X4 - (2000/CA 10PK/CA)

## (undated) DEVICE — NEPTUNE 4 PORT MANIFOLD - (20/PK)

## (undated) DEVICE — SHEET PEDIATRIC LAPAROTOMY - (10/CA)

## (undated) DEVICE — SYRINGE SAFETY 3 ML 18 GA X 1 1/2 BLUNT LL (100/BX 8BX/CA)

## (undated) DEVICE — SPONGE GAUZESTER 4 X 4 4PLY - (128PK/CA)

## (undated) DEVICE — KIT ANESTHESIA W/CIRCUIT & 3/LT BAG W/FILTER (20EA/CA)

## (undated) DEVICE — DRAIN BLAKE 10FR 3/4 FLUTED SILICONE CLOSED WOUND SUCTION CHANNEL  - (10/CA)

## (undated) DEVICE — TUBE CONNECTING SUCTION - CLEAR PLASTIC STERILE 72 IN (50EA/CA)

## (undated) DEVICE — TUBE E-T HI-LO CUFF 7.0MM (10EA/PK)

## (undated) DEVICE — RESERVOIR SUCTION 100 CC - SILICONE (20EA/CA)

## (undated) DEVICE — CATHETER IV SAFETY 20 GA X 1-1/4 (50/BX)

## (undated) DEVICE — PACK NEURO - (2EA/CA)

## (undated) DEVICE — CHLORAPREP 26 ML APPLICATOR - ORANGE TINT(25/CA)

## (undated) DEVICE — SUTURE 3-0 VICRYL PLUS RB-1 - 8 X 18 INCH (12/BX)

## (undated) DEVICE — LIGHT SOURCE MIS 12FT

## (undated) DEVICE — SYRINGE SAFETY 10 ML 18 GA X 1 1/2 BLUNT LL (100/BX 4BX/CA)

## (undated) DEVICE — SENSOR OXIMETER ADULT SPO2 RD SET (20EA/BX)

## (undated) DEVICE — KIT  I.V. START (100EA/CA)

## (undated) DEVICE — BLOCK BITE ENDOSCOPIC 2809 - (100/BX) INTERMEDIATE

## (undated) DEVICE — GLOVE, LITE (PAIR)

## (undated) DEVICE — SODIUM CHL IRRIGATION 0.9% 1000ML (12EA/CA)

## (undated) DEVICE — WATER IRRIGATION STERILE 1000ML (12EA/CA)

## (undated) DEVICE — ENDOJAG 035 SS 450 ANG (2/BX)

## (undated) DEVICE — NEEDLE SPINAL NON-SAFETY 18 GA X 3 IN (25EA/BX)

## (undated) DEVICE — RESTRAINTS LIMB DISP. - (12/BX 4BX/CA)

## (undated) DEVICE — CANISTER SUCTION 3000ML MECHANICAL FILTER AUTO SHUTOFF MEDI-VAC NONSTERILE LF DISP  (40EA/CA)

## (undated) DEVICE — BOVIE BLADE COATED &INSULATED - 25/PK

## (undated) DEVICE — MIDAS LUBRICATOR DIFFUSER PACK (4EA/CA)

## (undated) DEVICE — MASK ANESTHESIA ADULT  - (100/CA)

## (undated) DEVICE — SET LEADWIRE 5 LEAD BEDSIDE DISPOSABLE ECG (1SET OF 5/EA)

## (undated) DEVICE — TUBE E-T HI-LO CUFF 8.5MM (10EA/PK)

## (undated) DEVICE — TOWEL STOP TIMEOUT SAFETY FLAG (40EA/CA)

## (undated) DEVICE — SPONGE KITTNER DISSECTORS - (5EA/PK 50PK/CA)

## (undated) DEVICE — CLIP HEMOSTASIS ASSURANCE 16MM

## (undated) DEVICE — TOOL DISSECT MATCH HEAD

## (undated) DEVICE — SENSOR SPO2 NEO LNCS ADHESIVE (20/BX) SEE USER NOTES

## (undated) DEVICE — KIT SURGIFLO W/OUT THROMBIN - (6EA/CA)

## (undated) DEVICE — SUCTION INSTRUMENT YANKAUER BULBOUS TIP W/O VENT (50EA/CA)

## (undated) DEVICE — TUBE E-T HI-LO CUFF 6.5MM (10EA/BX)

## (undated) DEVICE — PROTECTOR ULNA NERVE - (36PR/CA)

## (undated) DEVICE — CATHETER IV SAFETY 22 GA X 1 (50EA/BX)

## (undated) DEVICE — BITE BLOCK ADULT 60FR (100EA/CA)

## (undated) DEVICE — ELECTRODE 850 FOAM ADHESIVE - HYDROGEL RADIOTRNSPRNT (50/PK)

## (undated) DEVICE — LACTATED RINGERS INJ. 500 ML - (24EA/CA)

## (undated) DEVICE — DISSECT TOOL MIDAS REX